# Patient Record
Sex: MALE | Race: BLACK OR AFRICAN AMERICAN | NOT HISPANIC OR LATINO | Employment: OTHER | URBAN - NONMETROPOLITAN AREA
[De-identification: names, ages, dates, MRNs, and addresses within clinical notes are randomized per-mention and may not be internally consistent; named-entity substitution may affect disease eponyms.]

---

## 2017-05-29 PROBLEM — Z12.11 SCREENING FOR COLON CANCER: Status: ACTIVE | Noted: 2017-05-29

## 2018-07-18 PROBLEM — Z12.11 SCREENING FOR COLON CANCER: Status: RESOLVED | Noted: 2017-05-29 | Resolved: 2018-07-18

## 2018-08-16 PROBLEM — M62.838 MUSCLE SPASM: Status: ACTIVE | Noted: 2018-08-16

## 2018-08-16 PROBLEM — M79.641 HAND PAIN, RIGHT: Status: ACTIVE | Noted: 2018-08-16

## 2018-08-16 PROBLEM — M47.816 LUMBAR SPONDYLOSIS: Status: ACTIVE | Noted: 2018-08-16

## 2018-08-16 PROBLEM — M79.18 MYOFASCIAL PAIN: Status: ACTIVE | Noted: 2018-08-16

## 2019-01-17 PROBLEM — M54.12 CERVICAL RADICULOPATHY AT C6: Status: ACTIVE | Noted: 2019-01-17

## 2020-04-17 ENCOUNTER — HISTORICAL (OUTPATIENT)
Dept: ADMINISTRATIVE | Facility: HOSPITAL | Age: 75
End: 2020-04-17

## 2020-04-17 LAB
ADENOVIRUS F 40/41: NOT DETECTED
ASTROVIRUS: NOT DETECTED
CONSISTENCY: NORMAL
CYCLOSPORA CAYETANENSIS: NOT DETECTED
ENTEROAGGREGATIVE E COLI: NOT DETECTED
ENTEROPATHOGENIC E COLI: NOT DETECTED
GI CONTROL: NORMAL
GPP - CAMPYLOBACTER: NOT DETECTED
GPP - CLOSTRIDIUM DIFFICILE TOXIN A/B: NORMAL
GPP - CRYPTOSPORIDIUM: NOT DETECTED
GPP - ENTAMOEBA HISTOLYTICA: NOT DETECTED
GPP - ENTEROTOXIGENIC E COLI (ETEC): NOT DETECTED
GPP - GIARDIA LAMBLIA: NOT DETECTED
GPP - NOROVIRUS GI/GII: NOT DETECTED
GPP - ROTAVIRUS A: NOT DETECTED
GPP - SALMONELLA: NOT DETECTED
GPP - SHIGELLA: NOT DETECTED
GPP - VIBRIO CHOLERA: NOT DETECTED
GPP - YERSINIA ENTEROCOLITICA: NOT DETECTED
LACTATE PLASV-SCNC: NOT DETECTED MMOL/L
PLESIOMONAS SHIGELLOIDES: NOT DETECTED
SAPOVIRUS: NOT DETECTED
VIBRIO: NOT DETECTED

## 2020-05-16 LAB
ALBUMIN SERPL BCP-MCNC: 3.8 G/DL (ref 3.5–5)
ALBUMIN/GLOB SERPL ELPH: 0.8 {RATIO} (ref 1.5–2.2)
ALP SERPL-CCNC: 104 U/L (ref 50–136)
ALT SERPL W P-5'-P-CCNC: 29 U/L (ref 16–61)
ANION GAP SERPL CALC-SCNC: 8.1 MEQ/L (ref 10–20)
AST SERPL-CCNC: 16 U/L (ref 15–37)
BASOPHILS NFR BLD: 0 % (ref 0–1.5)
BASOPHILS NFR BLD: 0 10 (ref 0–0.1)
BILIRUB SERPL-MCNC: 0.48 MG/DL (ref 0.2–1)
BUN SERPL-MCNC: 14 MG/DL (ref 7–18)
CALCIUM SERPL-MCNC: 9.4 MG/DL (ref 8.5–10.1)
CHLORIDE SERPL-SCNC: 108 MMOL/L (ref 98–107)
CO2 SERPL-SCNC: 28 MMOL/L (ref 22–32)
CREAT SERPL-MCNC: 1.19 MG/DL (ref 0.7–1.3)
EGFR: 77 ML/MIN/1.73M
EOSINOPHIL NFR BLD: 0 % (ref 0–7)
EOSINOPHIL NFR BLD: 0 10 (ref 0–0.7)
ERYTHROCYTE [DISTWIDTH] IN BLOOD BY AUTOMATED COUNT: 14.1 % (ref 11.5–14.5)
GLOBULIN: 4.5 G/DL (ref 2.3–3.5)
GLUCOSE SERPL-MCNC: 90 MG/DL (ref 70–99)
GRAN #: 6.39 10 (ref 2–7.5)
GRAN%: 0.3 %
GRAN%: 71 % (ref 50–80)
HCT VFR BLD AUTO: 40.4 % (ref 43.5–53.7)
HGB BLD-MCNC: 13.5 G/DL (ref 14.1–18.1)
IMMATURE GRANULOCYTES #: 0.03 10
LYMPH #: 2 10 (ref 1–3.5)
LYMPH%: 22.4 % (ref 12–50)
MCH RBC QN AUTO: 28.8 PG (ref 27–31)
MCHC RBC AUTO-ENTMCNC: 33.4 G% (ref 32–35)
MCV RBC AUTO: 86.1 FL (ref 80–97)
MONO #: 0.6 10 (ref 0–0.8)
MONO%: 6.3 % (ref 0–12)
OSMOC: 279 MOSM/KG (ref 275–295)
PMV BLD AUTO: 10.2 FL (ref 7.4–10.4)
PMV BLD AUTO: 183 10 (ref 142–424)
POTASSIUM SERPL-SCNC: 4.1 MMOL/L (ref 3.5–5.1)
PROT SERPL-MCNC: 8.3 G/DL (ref 6.4–8.2)
RBC # BLD AUTO: 4.69 M/UL (ref 4.69–6.13)
SODIUM BLD-SCNC: 140 MMOL/L (ref 136–145)
WBC # BLD AUTO: 9 10 (ref 4–10.2)

## 2020-07-28 ENCOUNTER — LAB VISIT (OUTPATIENT)
Dept: LAB | Facility: HOSPITAL | Age: 75
End: 2020-07-28
Attending: INTERNAL MEDICINE
Payer: MEDICARE

## 2020-07-28 DIAGNOSIS — N18.30 CHRONIC KIDNEY DISEASE, STAGE III (MODERATE): ICD-10-CM

## 2020-07-28 DIAGNOSIS — Q61.3 CONGENITAL POLYCYSTIC KIDNEY DISEASE: Primary | ICD-10-CM

## 2020-07-28 LAB
ALBUMIN SERPL BCP-MCNC: 3.2 G/DL (ref 3.5–5.2)
ALP SERPL-CCNC: 104 U/L (ref 55–135)
ALT SERPL W/O P-5'-P-CCNC: 34 U/L (ref 10–44)
ANION GAP SERPL CALC-SCNC: 11 MMOL/L (ref 8–16)
AST SERPL-CCNC: 24 U/L (ref 10–40)
BASOPHILS # BLD AUTO: 0.01 K/UL (ref 0–0.2)
BASOPHILS NFR BLD: 0.1 % (ref 0–1.9)
BILIRUB SERPL-MCNC: 0.6 MG/DL (ref 0.1–1)
BUN SERPL-MCNC: 17 MG/DL (ref 8–23)
CALCIUM SERPL-MCNC: 9.4 MG/DL (ref 8.7–10.5)
CHLORIDE SERPL-SCNC: 105 MMOL/L (ref 95–110)
CO2 SERPL-SCNC: 24 MMOL/L (ref 23–29)
CREAT SERPL-MCNC: 1.4 MG/DL (ref 0.5–1.4)
DIFFERENTIAL METHOD: ABNORMAL
EOSINOPHIL # BLD AUTO: 0 K/UL (ref 0–0.5)
EOSINOPHIL NFR BLD: 0 % (ref 0–8)
ERYTHROCYTE [DISTWIDTH] IN BLOOD BY AUTOMATED COUNT: 14.5 % (ref 11.5–14.5)
EST. GFR  (AFRICAN AMERICAN): 57.2 ML/MIN/1.73 M^2
EST. GFR  (NON AFRICAN AMERICAN): 49.5 ML/MIN/1.73 M^2
GLUCOSE SERPL-MCNC: 94 MG/DL (ref 70–110)
HCT VFR BLD AUTO: 42.4 % (ref 40–54)
HGB BLD-MCNC: 14.1 G/DL (ref 14–18)
IMM GRANULOCYTES # BLD AUTO: 0.08 K/UL (ref 0–0.04)
IMM GRANULOCYTES NFR BLD AUTO: 0.9 % (ref 0–0.5)
LYMPHOCYTES # BLD AUTO: 1.4 K/UL (ref 1–4.8)
LYMPHOCYTES NFR BLD: 15.5 % (ref 18–48)
MCH RBC QN AUTO: 28.6 PG (ref 27–31)
MCHC RBC AUTO-ENTMCNC: 33.3 G/DL (ref 32–36)
MCV RBC AUTO: 86 FL (ref 82–98)
MONOCYTES # BLD AUTO: 0.5 K/UL (ref 0.3–1)
MONOCYTES NFR BLD: 5.5 % (ref 4–15)
NEUTROPHILS # BLD AUTO: 7.3 K/UL (ref 1.8–7.7)
NEUTROPHILS NFR BLD: 78 % (ref 38–73)
NRBC BLD-RTO: 0 /100 WBC
PLATELET # BLD AUTO: 218 K/UL (ref 150–350)
PMV BLD AUTO: 10 FL (ref 9.2–12.9)
POTASSIUM SERPL-SCNC: 4.1 MMOL/L (ref 3.5–5.1)
PROT SERPL-MCNC: 7.7 G/DL (ref 6–8.4)
RBC # BLD AUTO: 4.93 M/UL (ref 4.6–6.2)
SODIUM SERPL-SCNC: 140 MMOL/L (ref 136–145)
WBC # BLD AUTO: 9.3 K/UL (ref 3.9–12.7)

## 2020-07-28 PROCEDURE — 85025 COMPLETE CBC W/AUTO DIFF WBC: CPT

## 2020-07-28 PROCEDURE — 36415 COLL VENOUS BLD VENIPUNCTURE: CPT

## 2020-07-28 PROCEDURE — 80053 COMPREHEN METABOLIC PANEL: CPT

## 2020-08-08 PROBLEM — I21.4 NSTEMI (NON-ST ELEVATED MYOCARDIAL INFARCTION): Status: ACTIVE | Noted: 2020-08-08

## 2020-08-11 PROBLEM — H92.01 RIGHT EAR PAIN: Status: ACTIVE | Noted: 2020-08-11

## 2020-08-11 PROBLEM — H92.02 LEFT EAR PAIN: Status: ACTIVE | Noted: 2020-08-11

## 2020-08-11 PROBLEM — I21.4 NSTEMI (NON-ST ELEVATED MYOCARDIAL INFARCTION): Status: RESOLVED | Noted: 2020-08-08 | Resolved: 2020-08-11

## 2020-08-11 PROBLEM — H70.91 MASTOIDITIS OF RIGHT SIDE: Status: ACTIVE | Noted: 2020-08-11

## 2020-10-09 ENCOUNTER — HOSPITAL ENCOUNTER (EMERGENCY)
Facility: HOSPITAL | Age: 75
Discharge: HOME | End: 2020-10-09
Attending: FAMILY MEDICINE
Payer: MEDICARE

## 2020-10-09 VITALS
WEIGHT: 185 LBS | SYSTOLIC BLOOD PRESSURE: 191 MMHG | OXYGEN SATURATION: 97 % | BODY MASS INDEX: 24.52 KG/M2 | RESPIRATION RATE: 16 BRPM | DIASTOLIC BLOOD PRESSURE: 89 MMHG | HEIGHT: 73 IN | HEART RATE: 78 BPM | TEMPERATURE: 99 F

## 2020-10-09 DIAGNOSIS — E79.0 ELEVATED URIC ACID IN BLOOD: ICD-10-CM

## 2020-10-09 DIAGNOSIS — M10.9 GOUT OF LEFT KNEE, UNSPECIFIED CAUSE, UNSPECIFIED CHRONICITY: ICD-10-CM

## 2020-10-09 DIAGNOSIS — M79.10 MYALGIA: Primary | ICD-10-CM

## 2020-10-09 LAB
ALBUMIN SERPL BCP-MCNC: 3.1 G/DL (ref 3.5–5.2)
ALP SERPL-CCNC: 87 U/L (ref 55–135)
ALT SERPL W/O P-5'-P-CCNC: 24 U/L (ref 10–44)
ANION GAP SERPL CALC-SCNC: 8 MMOL/L (ref 8–16)
AST SERPL-CCNC: 22 U/L (ref 10–40)
BASOPHILS # BLD AUTO: 0.01 K/UL (ref 0–0.2)
BASOPHILS NFR BLD: 0.1 % (ref 0–1.9)
BILIRUB SERPL-MCNC: 0.7 MG/DL (ref 0.1–1)
BUN SERPL-MCNC: 16 MG/DL (ref 8–23)
CALCIUM SERPL-MCNC: 8.8 MG/DL (ref 8.7–10.5)
CHLORIDE SERPL-SCNC: 106 MMOL/L (ref 95–110)
CO2 SERPL-SCNC: 26 MMOL/L (ref 23–29)
CREAT SERPL-MCNC: 1.4 MG/DL (ref 0.5–1.4)
DIFFERENTIAL METHOD: ABNORMAL
EOSINOPHIL # BLD AUTO: 0 K/UL (ref 0–0.5)
EOSINOPHIL NFR BLD: 0 % (ref 0–8)
ERYTHROCYTE [DISTWIDTH] IN BLOOD BY AUTOMATED COUNT: 14.5 % (ref 11.5–14.5)
EST. GFR  (AFRICAN AMERICAN): 57.2 ML/MIN/1.73 M^2
EST. GFR  (NON AFRICAN AMERICAN): 49.5 ML/MIN/1.73 M^2
GLUCOSE SERPL-MCNC: 125 MG/DL (ref 70–110)
HCT VFR BLD AUTO: 36.1 % (ref 40–54)
HGB BLD-MCNC: 11.7 G/DL (ref 14–18)
IMM GRANULOCYTES # BLD AUTO: 0.07 K/UL (ref 0–0.04)
IMM GRANULOCYTES NFR BLD AUTO: 0.6 % (ref 0–0.5)
INFLUENZA A, MOLECULAR: NEGATIVE
INFLUENZA B, MOLECULAR: NEGATIVE
LYMPHOCYTES # BLD AUTO: 1.6 K/UL (ref 1–4.8)
LYMPHOCYTES NFR BLD: 13.9 % (ref 18–48)
MCH RBC QN AUTO: 28.1 PG (ref 27–31)
MCHC RBC AUTO-ENTMCNC: 32.4 G/DL (ref 32–36)
MCV RBC AUTO: 87 FL (ref 82–98)
MONOCYTES # BLD AUTO: 0.4 K/UL (ref 0.3–1)
MONOCYTES NFR BLD: 3.4 % (ref 4–15)
NEUTROPHILS # BLD AUTO: 9.2 K/UL (ref 1.8–7.7)
NEUTROPHILS NFR BLD: 82 % (ref 38–73)
NRBC BLD-RTO: 0 /100 WBC
PLATELET # BLD AUTO: 210 K/UL (ref 150–350)
PMV BLD AUTO: 10.4 FL (ref 9.2–12.9)
POTASSIUM SERPL-SCNC: 3.4 MMOL/L (ref 3.5–5.1)
PROT SERPL-MCNC: 6.9 G/DL (ref 6–8.4)
RBC # BLD AUTO: 4.17 M/UL (ref 4.6–6.2)
SARS-COV-2 RDRP RESP QL NAA+PROBE: NEGATIVE
SODIUM SERPL-SCNC: 140 MMOL/L (ref 136–145)
SPECIMEN SOURCE: NORMAL
URATE SERPL-MCNC: 7.9 MG/DL (ref 3.4–7)
WBC # BLD AUTO: 11.22 K/UL (ref 3.9–12.7)

## 2020-10-09 PROCEDURE — 87502 INFLUENZA DNA AMP PROBE: CPT

## 2020-10-09 PROCEDURE — U0002 COVID-19 LAB TEST NON-CDC: HCPCS

## 2020-10-09 PROCEDURE — 84550 ASSAY OF BLOOD/URIC ACID: CPT

## 2020-10-09 PROCEDURE — 80053 COMPREHEN METABOLIC PANEL: CPT

## 2020-10-09 PROCEDURE — 96361 HYDRATE IV INFUSION ADD-ON: CPT

## 2020-10-09 PROCEDURE — 85025 COMPLETE CBC W/AUTO DIFF WBC: CPT

## 2020-10-09 PROCEDURE — 96375 TX/PRO/DX INJ NEW DRUG ADDON: CPT

## 2020-10-09 PROCEDURE — 25000003 PHARM REV CODE 250: Performed by: FAMILY MEDICINE

## 2020-10-09 PROCEDURE — 63600175 PHARM REV CODE 636 W HCPCS: Performed by: FAMILY MEDICINE

## 2020-10-09 PROCEDURE — 36415 COLL VENOUS BLD VENIPUNCTURE: CPT

## 2020-10-09 PROCEDURE — 99284 EMERGENCY DEPT VISIT MOD MDM: CPT | Mod: 25

## 2020-10-09 PROCEDURE — 96374 THER/PROPH/DIAG INJ IV PUSH: CPT

## 2020-10-09 RX ORDER — MORPHINE SULFATE 4 MG/ML
4 INJECTION, SOLUTION INTRAMUSCULAR; INTRAVENOUS
Status: COMPLETED | OUTPATIENT
Start: 2020-10-09 | End: 2020-10-09

## 2020-10-09 RX ORDER — KETOROLAC TROMETHAMINE 30 MG/ML
30 INJECTION, SOLUTION INTRAMUSCULAR; INTRAVENOUS ONCE
Status: COMPLETED | OUTPATIENT
Start: 2020-10-09 | End: 2020-10-09

## 2020-10-09 RX ORDER — OLMESARTAN MEDOXOMIL 40 MG/1
40 TABLET ORAL DAILY
COMMUNITY

## 2020-10-09 RX ORDER — SODIUM CHLORIDE 9 MG/ML
1000 INJECTION, SOLUTION INTRAVENOUS
Status: COMPLETED | OUTPATIENT
Start: 2020-10-09 | End: 2020-10-09

## 2020-10-09 RX ORDER — NAPROXEN SODIUM 550 MG/1
550 TABLET ORAL 2 TIMES DAILY WITH MEALS
Qty: 8 TABLET | Refills: 0 | Status: SHIPPED | OUTPATIENT
Start: 2020-10-09 | End: 2020-10-13

## 2020-10-09 RX ORDER — ROSUVASTATIN CALCIUM 40 MG/1
40 TABLET, COATED ORAL DAILY
COMMUNITY
End: 2021-10-28

## 2020-10-09 RX ADMIN — MORPHINE SULFATE 4 MG: 4 INJECTION, SOLUTION INTRAMUSCULAR; INTRAVENOUS at 10:10

## 2020-10-09 RX ADMIN — KETOROLAC TROMETHAMINE 30 MG: 30 INJECTION, SOLUTION INTRAMUSCULAR at 08:10

## 2020-10-09 RX ADMIN — SODIUM CHLORIDE 1000 ML: 0.9 INJECTION, SOLUTION INTRAVENOUS at 08:10

## 2020-10-09 NOTE — ED NOTES
Dr Luu instructed pt to take home blood pressure medication and wait 20 minutes for reassessment of bp.  Pt declined.  Pt stated that he would take his medication at home and monitor his blood pressure at home.

## 2020-10-09 NOTE — ED PROVIDER NOTES
Encounter Date: 10/9/2020       History     Chief Complaint   Patient presents with    Generalized Body Aches     onset 4 days ago     Patient is a 74-year-old male with a history of gout who presents with lower extremity myalgias especially in the knees bilaterally.  He also complains of aching in his ankles and feet.  This has been present about 2 days.  He denies any fever.  He denies any injury.        Review of patient's allergies indicates:   Allergen Reactions    Penicillins Rash     Past Medical History:   Diagnosis Date    BPH (benign prostatic hyperplasia)     High cholesterol     Hypertension      Past Surgical History:   Procedure Laterality Date    ANTERIOR CERVICAL DISCECTOMY W/ FUSION      cardiac stents      CATARACT EXTRACTION, BILATERAL      COLONOSCOPY      COLONOSCOPY N/A 2017    Procedure: COLONOSCOPY;  Surgeon: Luis Bogran-Reyes, MD;  Location: [facility];  Service: Endoscopy;  Laterality: N/A;    ESOPHAGOGASTRODUODENOSCOPY      LUMBAR LAMINECTOMY  2012    L2-4, Dr. Stover    PROSTATE SURGERY       Family History   Problem Relation Age of Onset    Stroke Mother     Hypertension Mother     Cataracts Father     Thyroid disease Sister     Blindness Brother     Stroke Sister      Social History     Tobacco Use    Smoking status: Former Smoker     Quit date: 1980     Years since quittin.9    Smokeless tobacco: Never Used   Substance Use Topics    Alcohol use: Yes     Comment: occ    Drug use: No     Review of Systems   Constitutional: Negative for fever.   HENT: Negative for sore throat.    Respiratory: Negative for shortness of breath.    Cardiovascular: Negative for chest pain.   Gastrointestinal: Negative for nausea.   Genitourinary: Negative for dysuria.   Musculoskeletal: Positive for arthralgias and myalgias. Negative for back pain.   Skin: Negative for rash.   Neurological: Negative for weakness.   Hematological: Does not bruise/bleed  easily.       Physical Exam     Initial Vitals [10/09/20 0756]   BP Pulse Resp Temp SpO2   (!) 156/83 98 16 98.7 °F (37.1 °C) 96 %      MAP       --         Physical Exam    Nursing note and vitals reviewed.  Constitutional: He appears well-developed and well-nourished.   HENT:   Head: Normocephalic and atraumatic.   Eyes: EOM are normal. Pupils are equal, round, and reactive to light.   Neck: Normal range of motion. Neck supple.   Cardiovascular: Normal rate, regular rhythm, normal heart sounds and intact distal pulses.   Pulmonary/Chest: Breath sounds normal. No respiratory distress. He has no wheezes. He has no rhonchi. He has no rales.   Abdominal: Soft. Bowel sounds are normal. He exhibits no distension. There is no abdominal tenderness. There is no rebound.   Musculoskeletal: Normal range of motion. No tenderness or edema.      Comments: Knees pain to palpation bilaterally.  There is no obvious effusion or swelling.  Pain is mainly with flexion and extension of the joints.   Neurological: He is alert and oriented to person, place, and time. No cranial nerve deficit.   Skin: Skin is warm. Capillary refill takes less than 2 seconds.   Psychiatric: He has a normal mood and affect. His behavior is normal. Judgment and thought content normal.         ED Course   Procedures  Labs Reviewed   CBC W/ AUTO DIFFERENTIAL - Abnormal; Notable for the following components:       Result Value    RBC 4.17 (*)     Hemoglobin 11.7 (*)     Hematocrit 36.1 (*)     Immature Granulocytes 0.6 (*)     Gran # (ANC) 9.2 (*)     Immature Grans (Abs) 0.07 (*)     Gran% 82.0 (*)     Lymph% 13.9 (*)     Mono% 3.4 (*)     All other components within normal limits   COMPREHENSIVE METABOLIC PANEL - Abnormal; Notable for the following components:    Potassium 3.4 (*)     Glucose 125 (*)     Albumin 3.1 (*)     eGFR if  57.2 (*)     eGFR if non  49.5 (*)     All other components within normal limits   URIC ACID -  Abnormal; Notable for the following components:    Uric Acid 7.9 (*)     All other components within normal limits   INFLUENZA A & B BY MOLECULAR   SARS-COV-2 RNA AMPLIFICATION, QUAL    Narrative:     Is this needed for pre-procedure or pre-op testing?->No          Imaging Results    None          Medical Decision Making:   Initial Assessment:   74-year-old male with lower extremity myalgias and arthralgias x2 days.  History of gout.  Patient is afebrile.  Differential Diagnosis:   Osteoarthritis, gout,  Clinical Tests:   Lab Tests: Ordered  ED Management:  Patient is symptomatically improved after IV Toradol and morphine.  Cause of patient's symptoms is uncertain, however could be secondary to gout flare and increase uric acid levels.  Patient will be given a prescription for Naprosyn.  He was advised to follow-up with his primary care physician.                             Clinical Impression:     ICD-10-CM ICD-9-CM   1. Myalgia  M79.10 729.1   2. Elevated uric acid in blood  E79.0 790.6   3. Gout of left knee, unspecified cause, unspecified chronicity  M10.9 274.9                      Disposition:   Disposition: Discharged  Condition: Stable     ED Disposition Condition    Discharge Stable        ED Prescriptions     Medication Sig Dispense Start Date End Date Auth. Provider    naproxen sodium (ANAPROX) 550 MG tablet Take 1 tablet (550 mg total) by mouth 2 (two) times daily with meals. for 8 doses 8 tablet 10/9/2020 10/13/2020 Ramesh Luu Jr., MD        Follow-up Information     Follow up With Specialties Details Why Contact Info    Tho Duran Jr., MD Internal Medicine  As needed [address] [facility] [place] [address]  [phone]                                         Ramesh Luu Jr., MD  10/09/20 1043

## 2020-11-14 ENCOUNTER — HOSPITAL ENCOUNTER (EMERGENCY)
Facility: HOSPITAL | Age: 75
Discharge: HOME | End: 2020-11-14
Attending: EMERGENCY MEDICINE
Payer: MEDICARE

## 2020-11-14 VITALS
BODY MASS INDEX: 25.31 KG/M2 | HEART RATE: 70 BPM | RESPIRATION RATE: 18 BRPM | OXYGEN SATURATION: 98 % | HEIGHT: 73 IN | TEMPERATURE: 98 F | DIASTOLIC BLOOD PRESSURE: 75 MMHG | SYSTOLIC BLOOD PRESSURE: 165 MMHG | WEIGHT: 191 LBS

## 2020-11-14 DIAGNOSIS — M54.40 LOW BACK PAIN WITH SCIATICA, SCIATICA LATERALITY UNSPECIFIED, UNSPECIFIED BACK PAIN LATERALITY, UNSPECIFIED CHRONICITY: Primary | ICD-10-CM

## 2020-11-14 PROCEDURE — 99281 EMR DPT VST MAYX REQ PHY/QHP: CPT

## 2020-11-14 NOTE — ED NOTES
NEUROLOGICAL:   Patient is awake , alert  and oriented x 4 . Pupils are PERRL. Gait is steady.   Moves all extremities without difficulty.   Patient reports no neuro complaints..  GCS 15    HEENT:   Head appears normocephalic  and symmetric .   Eyes appear WNL to both eyes. Patient reports no complaints  to both eyes .   Ears appear WNL. Patient reports no complaints  to both ears.   Nares appear patent . Patient reports no nose complaints .  Mouth appears moist, pink and teeth intact. Patient reports no mouth complaints.   Throat appears pink and moist . Patient reports no throat complaints.    CARDIOVASCULAR:   S1 and S2 present, no murmurs, gallops, or rubs, rate regular  and pulses palpable (2+)    On palpation no edema noted , noted to none.   Patient reports no CV complaints.  .   Patient vitals are WNL.    RESPIRATORY:   Airway Clear, Open, and Patent.  Respirations are even and unlabored.   Breath sounds clear  to all lung fields.   Patient reports no respiratory complaints.     GASTROINTESTINAL:   Abdomen is soft  and non-tender x 4 quadrants. Bowel sounds are normoactive to all quadrants .   Patient reports no GI complaints .     GENITOURINARY:   Patient reports no  complaints.     MUSCULOSKELETAL:   full range of motion to all extremities, no swelling noted , no tenderness noted and no weakness noted.   Patient reports generalized back pain (upper and lower). Patient reports this is a chronic problem but has seemed to worsen over the past day. Already taking Baclofen and Gabapentin. Reports he does not want to receive any medicine or shot here today    SKIN:   Skin appears warm , dry , good turgor, color normal for race and intact. Patient reports no skin complaints.

## 2020-11-14 NOTE — ED PROVIDER NOTES
"Encounter Date: 2020       History     Chief Complaint   Patient presents with    Back Pain     Patient to the ER with complaints of back pain onset "a long time ago"     74 year old male presents with back pain.  Denies new trauma.  Seen by Dr Duran this week and given Toradol.  Also has appointment with Dr Gray next week.  Taking gabapentin and baclofen.  Declines pain medication.  Denies weakness, fevers, abdominal pain        Review of patient's allergies indicates:   Allergen Reactions    Penicillins Rash     Past Medical History:   Diagnosis Date    BPH (benign prostatic hyperplasia)     High cholesterol     Hypertension      Past Surgical History:   Procedure Laterality Date    ANTERIOR CERVICAL DISCECTOMY W/ FUSION      cardiac stents      CATARACT EXTRACTION, BILATERAL      COLONOSCOPY      COLONOSCOPY N/A 2017    Procedure: COLONOSCOPY;  Surgeon: Luis Bogran-Reyes, MD;  Location: [facility];  Service: Endoscopy;  Laterality: N/A;    ESOPHAGOGASTRODUODENOSCOPY      LUMBAR LAMINECTOMY  2012    L2-4, Dr. Stover    PROSTATE SURGERY       Family History   Problem Relation Age of Onset    Stroke Mother     Hypertension Mother     Cataracts Father     Thyroid disease Sister     Blindness Brother     Stroke Sister      Social History     Tobacco Use    Smoking status: Former Smoker     Quit date: 1980     Years since quittin.0    Smokeless tobacco: Never Used   Substance Use Topics    Alcohol use: Yes     Comment: occ    Drug use: No     Review of Systems   Musculoskeletal: Positive for back pain.        Right upper leg pain   All other systems reviewed and are negative.      Physical Exam     Initial Vitals [20 1138]   BP Pulse Resp Temp SpO2   (!) 191/89 73 18 98.3 °F (36.8 °C) 98 %      MAP       --         Physical Exam    Nursing note and vitals reviewed.  Constitutional: He appears well-developed and well-nourished.   Cardiovascular: Normal " rate and regular rhythm.   Pulmonary/Chest: Breath sounds normal. No respiratory distress.   Abdominal: Soft. There is no abdominal tenderness.   Musculoskeletal:      Comments: Mild diffuse tenderness to palpation over lumbar area, no point tenderness, no crepitus, no deformity.  Full range of motion of lower extremities strength is normal   Neurological: He is alert and oriented to person, place, and time.   Skin: Skin is warm and dry.         ED Course   Procedures  Labs Reviewed - No data to display       Imaging Results    None          Medical Decision Making:   ED Management:  Patient declines pain medication, he has seen Dr. Duran has appoint with Dr. Gray and possibly an appointment to have an MRI                             Clinical Impression:     ICD-10-CM ICD-9-CM   1. Low back pain with sciatica, sciatica laterality unspecified, unspecified back pain laterality, unspecified chronicity  M54.40 724.3                          ED Disposition Condition    Discharge Stable        ED Prescriptions     None        Follow-up Information     Follow up With Specialties Details Why Contact Info    Tho Duran Jr., MD Internal Medicine  As needed, If symptoms worsen [address] [facility] [place] [address]  [phone]                                         Nikolai Shah MD  11/14/20 1247

## 2021-01-28 ENCOUNTER — HOSPITAL ENCOUNTER (OUTPATIENT)
Dept: RADIOLOGY | Facility: HOSPITAL | Age: 76
Discharge: HOME | End: 2021-01-28
Attending: INTERNAL MEDICINE
Payer: MEDICARE

## 2021-01-28 DIAGNOSIS — Z01.818 PRE-OP EXAMINATION: Primary | ICD-10-CM

## 2021-01-28 DIAGNOSIS — Z01.818 PRE-OP EXAMINATION: ICD-10-CM

## 2021-01-28 PROCEDURE — 71046 X-RAY EXAM CHEST 2 VIEWS: CPT | Mod: TC

## 2021-01-29 ENCOUNTER — APPOINTMENT (OUTPATIENT)
Dept: LAB | Facility: HOSPITAL | Age: 76
End: 2021-01-29
Attending: INTERNAL MEDICINE
Payer: MEDICARE

## 2021-01-29 DIAGNOSIS — Z01.810 PRE-OPERATIVE CARDIOVASCULAR EXAMINATION: Primary | ICD-10-CM

## 2021-01-29 LAB — SARS-COV-2 RNA RESP QL NAA+PROBE: NOT DETECTED

## 2021-01-29 PROCEDURE — U0002 COVID-19 LAB TEST NON-CDC: HCPCS

## 2021-02-02 PROBLEM — I20.9 ANGINA, CLASS III: Status: ACTIVE | Noted: 2021-02-02

## 2021-02-27 ENCOUNTER — HOSPITAL ENCOUNTER (EMERGENCY)
Facility: HOSPITAL | Age: 76
Discharge: HOME | End: 2021-02-27
Attending: EMERGENCY MEDICINE
Payer: MEDICARE

## 2021-02-27 VITALS
TEMPERATURE: 99 F | WEIGHT: 185 LBS | BODY MASS INDEX: 24.52 KG/M2 | SYSTOLIC BLOOD PRESSURE: 152 MMHG | HEIGHT: 73 IN | RESPIRATION RATE: 18 BRPM | HEART RATE: 69 BPM | OXYGEN SATURATION: 97 % | DIASTOLIC BLOOD PRESSURE: 81 MMHG

## 2021-02-27 DIAGNOSIS — S39.012A LUMBAR STRAIN, INITIAL ENCOUNTER: ICD-10-CM

## 2021-02-27 DIAGNOSIS — M54.50 ACUTE LEFT-SIDED LOW BACK PAIN WITHOUT SCIATICA: Primary | ICD-10-CM

## 2021-02-27 PROCEDURE — 96372 THER/PROPH/DIAG INJ SC/IM: CPT

## 2021-02-27 PROCEDURE — 63600175 PHARM REV CODE 636 W HCPCS: Performed by: EMERGENCY MEDICINE

## 2021-02-27 PROCEDURE — 99284 EMERGENCY DEPT VISIT MOD MDM: CPT | Mod: 25

## 2021-02-27 PROCEDURE — 25000003 PHARM REV CODE 250: Performed by: EMERGENCY MEDICINE

## 2021-02-27 RX ORDER — METHOCARBAMOL 500 MG/1
1000 TABLET, FILM COATED ORAL 3 TIMES DAILY
Qty: 30 TABLET | Refills: 0 | Status: SHIPPED | OUTPATIENT
Start: 2021-02-27 | End: 2021-03-04

## 2021-02-27 RX ORDER — DEXAMETHASONE SODIUM PHOSPHATE 4 MG/ML
8 INJECTION, SOLUTION INTRA-ARTICULAR; INTRALESIONAL; INTRAMUSCULAR; INTRAVENOUS; SOFT TISSUE
Status: COMPLETED | OUTPATIENT
Start: 2021-02-27 | End: 2021-02-27

## 2021-02-27 RX ORDER — METHOCARBAMOL 500 MG/1
1000 TABLET, FILM COATED ORAL
Status: COMPLETED | OUTPATIENT
Start: 2021-02-27 | End: 2021-02-27

## 2021-02-27 RX ORDER — KETOROLAC TROMETHAMINE 30 MG/ML
30 INJECTION, SOLUTION INTRAMUSCULAR; INTRAVENOUS
Status: COMPLETED | OUTPATIENT
Start: 2021-02-27 | End: 2021-02-27

## 2021-02-27 RX ADMIN — METHOCARBAMOL 1000 MG: 500 TABLET ORAL at 08:02

## 2021-02-27 RX ADMIN — KETOROLAC TROMETHAMINE 30 MG: 30 INJECTION, SOLUTION INTRAMUSCULAR at 08:02

## 2021-02-27 RX ADMIN — DEXAMETHASONE SODIUM PHOSPHATE 8 MG: 4 INJECTION, SOLUTION INTRA-ARTICULAR; INTRALESIONAL; INTRAMUSCULAR; INTRAVENOUS; SOFT TISSUE at 08:02

## 2021-03-22 PROBLEM — M48.062 SPINAL STENOSIS OF LUMBAR REGION WITH NEUROGENIC CLAUDICATION: Status: ACTIVE | Noted: 2021-03-22

## 2021-04-27 ENCOUNTER — HOSPITAL ENCOUNTER (EMERGENCY)
Facility: HOSPITAL | Age: 76
Discharge: HOME | End: 2021-04-27
Attending: EMERGENCY MEDICINE
Payer: MEDICARE

## 2021-04-27 VITALS
BODY MASS INDEX: 24.52 KG/M2 | DIASTOLIC BLOOD PRESSURE: 82 MMHG | WEIGHT: 185 LBS | HEIGHT: 73 IN | SYSTOLIC BLOOD PRESSURE: 162 MMHG | OXYGEN SATURATION: 98 % | HEART RATE: 66 BPM | RESPIRATION RATE: 18 BRPM | TEMPERATURE: 98 F

## 2021-04-27 DIAGNOSIS — M79.10 MYALGIA: ICD-10-CM

## 2021-04-27 DIAGNOSIS — R11.0 NAUSEA: Primary | ICD-10-CM

## 2021-04-27 DIAGNOSIS — G89.29 CHRONIC BACK PAIN, UNSPECIFIED BACK LOCATION, UNSPECIFIED BACK PAIN LATERALITY: ICD-10-CM

## 2021-04-27 DIAGNOSIS — M54.9 CHRONIC BACK PAIN, UNSPECIFIED BACK LOCATION, UNSPECIFIED BACK PAIN LATERALITY: ICD-10-CM

## 2021-04-27 LAB
ALBUMIN SERPL BCP-MCNC: 3.5 G/DL (ref 3.5–5.2)
ALP SERPL-CCNC: 126 U/L (ref 55–135)
ALT SERPL W/O P-5'-P-CCNC: 24 U/L (ref 10–44)
ANION GAP SERPL CALC-SCNC: 5 MMOL/L (ref 8–16)
AST SERPL-CCNC: 18 U/L (ref 10–40)
BASOPHILS # BLD AUTO: 0.01 K/UL (ref 0–0.2)
BASOPHILS NFR BLD: 0.1 % (ref 0–1.9)
BILIRUB SERPL-MCNC: 0.5 MG/DL (ref 0.1–1)
BILIRUB UR QL STRIP: NEGATIVE
BUN SERPL-MCNC: 18 MG/DL (ref 8–23)
CALCIUM SERPL-MCNC: 9.5 MG/DL (ref 8.7–10.5)
CHLORIDE SERPL-SCNC: 109 MMOL/L (ref 95–110)
CLARITY UR: CLEAR
CO2 SERPL-SCNC: 26 MMOL/L (ref 23–29)
COLOR UR: YELLOW
CREAT SERPL-MCNC: 1.5 MG/DL (ref 0.5–1.4)
CTP QC/QA: YES
DIFFERENTIAL METHOD: ABNORMAL
EOSINOPHIL # BLD AUTO: 0 K/UL (ref 0–0.5)
EOSINOPHIL NFR BLD: 0 % (ref 0–8)
ERYTHROCYTE [DISTWIDTH] IN BLOOD BY AUTOMATED COUNT: 13.6 % (ref 11.5–14.5)
EST. GFR  (AFRICAN AMERICAN): 52.3 ML/MIN/1.73 M^2
EST. GFR  (NON AFRICAN AMERICAN): 45.2 ML/MIN/1.73 M^2
GLUCOSE SERPL-MCNC: 128 MG/DL (ref 70–110)
GLUCOSE UR QL STRIP: NEGATIVE
HCT VFR BLD AUTO: 39.6 % (ref 40–54)
HGB BLD-MCNC: 12.9 G/DL (ref 14–18)
HGB UR QL STRIP: NEGATIVE
IMM GRANULOCYTES # BLD AUTO: 0.03 K/UL (ref 0–0.04)
IMM GRANULOCYTES NFR BLD AUTO: 0.4 % (ref 0–0.5)
KETONES UR QL STRIP: NEGATIVE
LEUKOCYTE ESTERASE UR QL STRIP: NEGATIVE
LYMPHOCYTES # BLD AUTO: 1.5 K/UL (ref 1–4.8)
LYMPHOCYTES NFR BLD: 22.7 % (ref 18–48)
MCH RBC QN AUTO: 27.6 PG (ref 27–31)
MCHC RBC AUTO-ENTMCNC: 32.6 G/DL (ref 32–36)
MCV RBC AUTO: 85 FL (ref 82–98)
MONOCYTES # BLD AUTO: 0.5 K/UL (ref 0.3–1)
MONOCYTES NFR BLD: 7.6 % (ref 4–15)
NEUTROPHILS # BLD AUTO: 4.6 K/UL (ref 1.8–7.7)
NEUTROPHILS NFR BLD: 69.2 % (ref 38–73)
NITRITE UR QL STRIP: NEGATIVE
NRBC BLD-RTO: 0 /100 WBC
PH UR STRIP: 5 [PH] (ref 5–8)
PLATELET # BLD AUTO: 231 K/UL (ref 150–450)
PMV BLD AUTO: 9.4 FL (ref 9.2–12.9)
POTASSIUM SERPL-SCNC: 3.7 MMOL/L (ref 3.5–5.1)
PROT SERPL-MCNC: 8.3 G/DL (ref 6–8.4)
PROT UR QL STRIP: NEGATIVE
RBC # BLD AUTO: 4.67 M/UL (ref 4.6–6.2)
SARS-COV-2 RDRP RESP QL NAA+PROBE: NEGATIVE
SODIUM SERPL-SCNC: 140 MMOL/L (ref 136–145)
SP GR UR STRIP: 1.01 (ref 1–1.03)
URN SPEC COLLECT METH UR: NORMAL
UROBILINOGEN UR STRIP-ACNC: NEGATIVE EU/DL
WBC # BLD AUTO: 6.71 K/UL (ref 3.9–12.7)

## 2021-04-27 PROCEDURE — 36415 COLL VENOUS BLD VENIPUNCTURE: CPT | Performed by: NURSE PRACTITIONER

## 2021-04-27 PROCEDURE — 80053 COMPREHEN METABOLIC PANEL: CPT | Performed by: NURSE PRACTITIONER

## 2021-04-27 PROCEDURE — 85025 COMPLETE CBC W/AUTO DIFF WBC: CPT | Performed by: NURSE PRACTITIONER

## 2021-04-27 PROCEDURE — 81003 URINALYSIS AUTO W/O SCOPE: CPT | Performed by: NURSE PRACTITIONER

## 2021-04-27 PROCEDURE — U0002 COVID-19 LAB TEST NON-CDC: HCPCS | Performed by: NURSE PRACTITIONER

## 2021-04-27 PROCEDURE — 99283 EMERGENCY DEPT VISIT LOW MDM: CPT

## 2021-04-27 RX ORDER — TRAMADOL HYDROCHLORIDE 50 MG/1
50 TABLET ORAL EVERY 6 HOURS PRN
COMMUNITY
End: 2021-07-07

## 2021-05-06 ENCOUNTER — PATIENT MESSAGE (OUTPATIENT)
Dept: RESEARCH | Facility: HOSPITAL | Age: 76
End: 2021-05-06

## 2021-05-21 PROBLEM — R73.01 IFG (IMPAIRED FASTING GLUCOSE): Status: ACTIVE | Noted: 2021-05-21

## 2021-05-21 PROBLEM — R07.89 CHEST WALL PAIN: Status: ACTIVE | Noted: 2021-05-21

## 2021-05-21 PROBLEM — N52.9 ERECTILE DYSFUNCTION: Status: ACTIVE | Noted: 2021-05-21

## 2021-05-21 PROBLEM — M25.519 SHOULDER PAIN: Status: ACTIVE | Noted: 2021-05-21

## 2021-05-21 PROBLEM — N18.30 CKD (CHRONIC KIDNEY DISEASE), STAGE III: Status: ACTIVE | Noted: 2021-05-21

## 2021-06-21 PROBLEM — E63.0 ESSENTIAL FATTY ACID DEFICIENCY: Status: ACTIVE | Noted: 2021-06-21

## 2021-07-01 ENCOUNTER — PATIENT MESSAGE (OUTPATIENT)
Dept: ADMINISTRATIVE | Facility: OTHER | Age: 76
End: 2021-07-01

## 2021-07-02 PROBLEM — E61.8 DEFICIENCY OF COENZYME Q10: Status: ACTIVE | Noted: 2021-07-02

## 2021-07-02 PROBLEM — Z00.00 WELL ADULT EXAM: Status: ACTIVE | Noted: 2021-07-02

## 2021-07-02 PROBLEM — E88.89 DEFICIENCY OF COENZYME Q10: Status: ACTIVE | Noted: 2021-07-02

## 2021-07-29 DIAGNOSIS — Q61.3 POLYCYSTIC KIDNEY, UNSPECIFIED: Primary | ICD-10-CM

## 2021-07-30 ENCOUNTER — HOSPITAL ENCOUNTER (OUTPATIENT)
Dept: RADIOLOGY | Facility: HOSPITAL | Age: 76
Discharge: HOME | End: 2021-07-30
Attending: INTERNAL MEDICINE
Payer: MEDICARE

## 2021-07-30 ENCOUNTER — HOSPITAL ENCOUNTER (EMERGENCY)
Facility: HOSPITAL | Age: 76
Discharge: HOME | End: 2021-07-30
Payer: MEDICARE

## 2021-07-30 VITALS
HEIGHT: 73 IN | OXYGEN SATURATION: 100 % | RESPIRATION RATE: 18 BRPM | HEART RATE: 84 BPM | SYSTOLIC BLOOD PRESSURE: 161 MMHG | DIASTOLIC BLOOD PRESSURE: 92 MMHG | TEMPERATURE: 98 F | BODY MASS INDEX: 23.06 KG/M2 | WEIGHT: 174 LBS

## 2021-07-30 DIAGNOSIS — Q61.3 POLYCYSTIC KIDNEY, UNSPECIFIED: ICD-10-CM

## 2021-07-30 PROCEDURE — 99900041 HC LEFT WITHOUT BEING SEEN- EMERGENCY

## 2021-07-30 PROCEDURE — 76770 US EXAM ABDO BACK WALL COMP: CPT | Mod: TC

## 2021-10-04 PROBLEM — Z00.00 WELL ADULT EXAM: Status: RESOLVED | Noted: 2021-07-02 | Resolved: 2021-10-04

## 2021-12-13 ENCOUNTER — LAB VISIT (OUTPATIENT)
Dept: LAB | Facility: HOSPITAL | Age: 76
End: 2021-12-13
Attending: PHYSICIAN ASSISTANT
Payer: MEDICARE

## 2021-12-13 DIAGNOSIS — R10.9 GASTRIC PAIN: Primary | ICD-10-CM

## 2021-12-13 DIAGNOSIS — R19.7 DIARRHEA OF PRESUMED INFECTIOUS ORIGIN: ICD-10-CM

## 2021-12-13 LAB
ALBUMIN SERPL BCP-MCNC: 3.5 G/DL (ref 3.5–5.2)
ALP SERPL-CCNC: 105 U/L (ref 55–135)
ALT SERPL W/O P-5'-P-CCNC: 38 U/L (ref 10–44)
ANION GAP SERPL CALC-SCNC: 6 MMOL/L (ref 8–16)
AST SERPL-CCNC: 18 U/L (ref 10–40)
BASOPHILS # BLD AUTO: 0 K/UL (ref 0–0.2)
BASOPHILS NFR BLD: 0 % (ref 0–1.9)
BILIRUB SERPL-MCNC: 0.5 MG/DL (ref 0.1–1)
BUN SERPL-MCNC: 19 MG/DL (ref 8–23)
CALCIUM SERPL-MCNC: 9.4 MG/DL (ref 8.7–10.5)
CHLORIDE SERPL-SCNC: 108 MMOL/L (ref 95–110)
CO2 SERPL-SCNC: 26 MMOL/L (ref 23–29)
CREAT SERPL-MCNC: 1.4 MG/DL (ref 0.5–1.4)
DIFFERENTIAL METHOD: ABNORMAL
EOSINOPHIL # BLD AUTO: 0 K/UL (ref 0–0.5)
EOSINOPHIL NFR BLD: 0 % (ref 0–8)
ERYTHROCYTE [DISTWIDTH] IN BLOOD BY AUTOMATED COUNT: 14.3 % (ref 11.5–14.5)
EST. GFR  (AFRICAN AMERICAN): 56.4 ML/MIN/1.73 M^2
EST. GFR  (NON AFRICAN AMERICAN): 48.8 ML/MIN/1.73 M^2
GLUCOSE SERPL-MCNC: 98 MG/DL (ref 70–110)
HCT VFR BLD AUTO: 40.3 % (ref 40–54)
HGB BLD-MCNC: 13.2 G/DL (ref 14–18)
IMM GRANULOCYTES # BLD AUTO: 0.03 K/UL (ref 0–0.04)
IMM GRANULOCYTES NFR BLD AUTO: 0.3 % (ref 0–0.5)
LYMPHOCYTES # BLD AUTO: 2.4 K/UL (ref 1–4.8)
LYMPHOCYTES NFR BLD: 27.5 % (ref 18–48)
MCH RBC QN AUTO: 28.3 PG (ref 27–31)
MCHC RBC AUTO-ENTMCNC: 32.8 G/DL (ref 32–36)
MCV RBC AUTO: 86 FL (ref 82–98)
MONOCYTES # BLD AUTO: 0.6 K/UL (ref 0.3–1)
MONOCYTES NFR BLD: 6.8 % (ref 4–15)
NEUTROPHILS # BLD AUTO: 5.7 K/UL (ref 1.8–7.7)
NEUTROPHILS NFR BLD: 65.4 % (ref 38–73)
NRBC BLD-RTO: 0 /100 WBC
PLATELET # BLD AUTO: 146 K/UL (ref 150–450)
PMV BLD AUTO: 9.9 FL (ref 9.2–12.9)
POTASSIUM SERPL-SCNC: 3.9 MMOL/L (ref 3.5–5.1)
PROT SERPL-MCNC: 7.8 G/DL (ref 6–8.4)
RBC # BLD AUTO: 4.67 M/UL (ref 4.6–6.2)
SODIUM SERPL-SCNC: 140 MMOL/L (ref 136–145)
WBC # BLD AUTO: 8.77 K/UL (ref 3.9–12.7)

## 2021-12-13 PROCEDURE — 85025 COMPLETE CBC W/AUTO DIFF WBC: CPT | Performed by: PHYSICIAN ASSISTANT

## 2021-12-13 PROCEDURE — 36415 COLL VENOUS BLD VENIPUNCTURE: CPT | Performed by: PHYSICIAN ASSISTANT

## 2021-12-13 PROCEDURE — 87177 OVA AND PARASITES SMEARS: CPT | Performed by: PHYSICIAN ASSISTANT

## 2021-12-13 PROCEDURE — 87427 SHIGA-LIKE TOXIN AG IA: CPT | Mod: 59 | Performed by: PHYSICIAN ASSISTANT

## 2021-12-13 PROCEDURE — 87046 STOOL CULTR AEROBIC BACT EA: CPT | Performed by: PHYSICIAN ASSISTANT

## 2021-12-13 PROCEDURE — 80053 COMPREHEN METABOLIC PANEL: CPT | Performed by: PHYSICIAN ASSISTANT

## 2021-12-13 PROCEDURE — 87209 SMEAR COMPLEX STAIN: CPT | Performed by: PHYSICIAN ASSISTANT

## 2021-12-13 PROCEDURE — 87045 FECES CULTURE AEROBIC BACT: CPT | Performed by: PHYSICIAN ASSISTANT

## 2021-12-17 LAB
BACTERIA STL CULT: NORMAL
BACTERIA STL CULT: NORMAL
E COLI SXT1 STL QL IA: NEGATIVE
E COLI SXT2 STL QL IA: NEGATIVE

## 2021-12-18 LAB — O+P STL MICRO: NORMAL

## 2022-02-26 ENCOUNTER — HOSPITAL ENCOUNTER (EMERGENCY)
Facility: HOSPITAL | Age: 77
Discharge: HOME | End: 2022-02-26
Attending: EMERGENCY MEDICINE
Payer: MEDICARE

## 2022-02-26 VITALS
SYSTOLIC BLOOD PRESSURE: 194 MMHG | OXYGEN SATURATION: 97 % | RESPIRATION RATE: 18 BRPM | HEART RATE: 68 BPM | HEIGHT: 73 IN | BODY MASS INDEX: 23.59 KG/M2 | WEIGHT: 178 LBS | TEMPERATURE: 98 F | DIASTOLIC BLOOD PRESSURE: 89 MMHG

## 2022-02-26 DIAGNOSIS — K57.92 DIVERTICULITIS: Primary | ICD-10-CM

## 2022-02-26 LAB
ALBUMIN SERPL BCP-MCNC: 3.4 G/DL (ref 3.5–5.2)
ALP SERPL-CCNC: 98 U/L (ref 55–135)
ALT SERPL W/O P-5'-P-CCNC: 53 U/L (ref 10–44)
ANION GAP SERPL CALC-SCNC: 2 MMOL/L (ref 8–16)
AST SERPL-CCNC: 27 U/L (ref 10–40)
BASOPHILS # BLD AUTO: 0.01 K/UL (ref 0–0.2)
BASOPHILS NFR BLD: 0.1 % (ref 0–1.9)
BILIRUB SERPL-MCNC: 0.4 MG/DL (ref 0.1–1)
BILIRUB UR QL STRIP: NEGATIVE
BUN SERPL-MCNC: 20 MG/DL (ref 8–23)
CALCIUM SERPL-MCNC: 8.9 MG/DL (ref 8.7–10.5)
CHLORIDE SERPL-SCNC: 107 MMOL/L (ref 95–110)
CLARITY UR: CLEAR
CO2 SERPL-SCNC: 31 MMOL/L (ref 23–29)
COLOR UR: YELLOW
CREAT SERPL-MCNC: 1.5 MG/DL (ref 0.5–1.4)
DIFFERENTIAL METHOD: ABNORMAL
EOSINOPHIL # BLD AUTO: 0 K/UL (ref 0–0.5)
EOSINOPHIL NFR BLD: 0 % (ref 0–8)
ERYTHROCYTE [DISTWIDTH] IN BLOOD BY AUTOMATED COUNT: 14.3 % (ref 11.5–14.5)
EST. GFR  (AFRICAN AMERICAN): 51.9 ML/MIN/1.73 M^2
EST. GFR  (NON AFRICAN AMERICAN): 44.9 ML/MIN/1.73 M^2
GLUCOSE SERPL-MCNC: 111 MG/DL (ref 70–110)
GLUCOSE UR QL STRIP: NEGATIVE
HCT VFR BLD AUTO: 40.5 % (ref 40–54)
HGB BLD-MCNC: 14.3 G/DL (ref 14–18)
HGB UR QL STRIP: NEGATIVE
IMM GRANULOCYTES # BLD AUTO: 0.05 K/UL (ref 0–0.04)
IMM GRANULOCYTES NFR BLD AUTO: 0.7 % (ref 0–0.5)
KETONES UR QL STRIP: NEGATIVE
LEUKOCYTE ESTERASE UR QL STRIP: NEGATIVE
LIPASE SERPL-CCNC: 317 U/L (ref 23–300)
LYMPHOCYTES # BLD AUTO: 1.9 K/UL (ref 1–4.8)
LYMPHOCYTES NFR BLD: 25.7 % (ref 18–48)
MCH RBC QN AUTO: 30.5 PG (ref 27–31)
MCHC RBC AUTO-ENTMCNC: 35.3 G/DL (ref 32–36)
MCV RBC AUTO: 86 FL (ref 82–98)
MONOCYTES # BLD AUTO: 0.5 K/UL (ref 0.3–1)
MONOCYTES NFR BLD: 6.8 % (ref 4–15)
NEUTROPHILS # BLD AUTO: 4.9 K/UL (ref 1.8–7.7)
NEUTROPHILS NFR BLD: 66.7 % (ref 38–73)
NITRITE UR QL STRIP: NEGATIVE
NRBC BLD-RTO: 0 /100 WBC
PH UR STRIP: 6 [PH] (ref 5–8)
PLATELET # BLD AUTO: 150 K/UL (ref 150–450)
PMV BLD AUTO: 10.2 FL (ref 9.2–12.9)
POTASSIUM SERPL-SCNC: 3.6 MMOL/L (ref 3.5–5.1)
PROT SERPL-MCNC: 7.5 G/DL (ref 6–8.4)
PROT UR QL STRIP: NEGATIVE
RBC # BLD AUTO: 4.69 M/UL (ref 4.6–6.2)
SODIUM SERPL-SCNC: 140 MMOL/L (ref 136–145)
SP GR UR STRIP: 1.01 (ref 1–1.03)
URN SPEC COLLECT METH UR: NORMAL
UROBILINOGEN UR STRIP-ACNC: NEGATIVE EU/DL
WBC # BLD AUTO: 7.35 K/UL (ref 3.9–12.7)

## 2022-02-26 PROCEDURE — 85025 COMPLETE CBC W/AUTO DIFF WBC: CPT | Performed by: EMERGENCY MEDICINE

## 2022-02-26 PROCEDURE — 83690 ASSAY OF LIPASE: CPT | Performed by: EMERGENCY MEDICINE

## 2022-02-26 PROCEDURE — 96374 THER/PROPH/DIAG INJ IV PUSH: CPT

## 2022-02-26 PROCEDURE — 81003 URINALYSIS AUTO W/O SCOPE: CPT | Performed by: EMERGENCY MEDICINE

## 2022-02-26 PROCEDURE — 36415 COLL VENOUS BLD VENIPUNCTURE: CPT | Performed by: EMERGENCY MEDICINE

## 2022-02-26 PROCEDURE — 25500020 PHARM REV CODE 255: Performed by: EMERGENCY MEDICINE

## 2022-02-26 PROCEDURE — 63600175 PHARM REV CODE 636 W HCPCS: Performed by: EMERGENCY MEDICINE

## 2022-02-26 PROCEDURE — 99285 EMERGENCY DEPT VISIT HI MDM: CPT | Mod: 25

## 2022-02-26 PROCEDURE — 96372 THER/PROPH/DIAG INJ SC/IM: CPT | Mod: 59

## 2022-02-26 PROCEDURE — 25000003 PHARM REV CODE 250: Performed by: EMERGENCY MEDICINE

## 2022-02-26 PROCEDURE — 80053 COMPREHEN METABOLIC PANEL: CPT | Performed by: EMERGENCY MEDICINE

## 2022-02-26 RX ORDER — SIMETHICONE 80 MG
1 TABLET,CHEWABLE ORAL ONCE
Status: COMPLETED | OUTPATIENT
Start: 2022-02-26 | End: 2022-02-26

## 2022-02-26 RX ORDER — DICYCLOMINE HYDROCHLORIDE 20 MG/1
20 TABLET ORAL 2 TIMES DAILY PRN
Qty: 20 TABLET | Refills: 0 | Status: SHIPPED | OUTPATIENT
Start: 2022-02-26 | End: 2022-03-28

## 2022-02-26 RX ORDER — METRONIDAZOLE 500 MG/1
500 TABLET ORAL EVERY 8 HOURS
Qty: 21 TABLET | Refills: 0 | Status: SHIPPED | OUTPATIENT
Start: 2022-02-26 | End: 2022-03-05

## 2022-02-26 RX ORDER — CIPROFLOXACIN 500 MG/1
500 TABLET ORAL 2 TIMES DAILY
Qty: 14 TABLET | Refills: 0 | Status: SHIPPED | OUTPATIENT
Start: 2022-02-26 | End: 2022-03-05

## 2022-02-26 RX ORDER — LOPERAMIDE HYDROCHLORIDE 2 MG/1
2 CAPSULE ORAL 4 TIMES DAILY PRN
Qty: 30 CAPSULE | Refills: 0 | Status: SHIPPED | OUTPATIENT
Start: 2022-02-26 | End: 2022-03-08

## 2022-02-26 RX ORDER — SIMETHICONE 80 MG
80 TABLET,CHEWABLE ORAL EVERY 6 HOURS PRN
Qty: 30 TABLET | Refills: 0 | Status: SHIPPED | OUTPATIENT
Start: 2022-02-26 | End: 2023-05-23 | Stop reason: ALTCHOICE

## 2022-02-26 RX ORDER — DICYCLOMINE HYDROCHLORIDE 10 MG/ML
20 INJECTION INTRAMUSCULAR
Status: COMPLETED | OUTPATIENT
Start: 2022-02-26 | End: 2022-02-26

## 2022-02-26 RX ORDER — MORPHINE SULFATE 4 MG/ML
4 INJECTION, SOLUTION INTRAMUSCULAR; INTRAVENOUS
Status: COMPLETED | OUTPATIENT
Start: 2022-02-26 | End: 2022-02-26

## 2022-02-26 RX ADMIN — DICYCLOMINE HYDROCHLORIDE 20 MG: 20 INJECTION, SOLUTION INTRAMUSCULAR at 09:02

## 2022-02-26 RX ADMIN — SIMETHICONE 80 MG: 80 TABLET, CHEWABLE ORAL at 11:02

## 2022-02-26 RX ADMIN — MORPHINE SULFATE 4 MG: 4 INJECTION INTRAVENOUS at 09:02

## 2022-02-26 RX ADMIN — IOHEXOL 100 ML: 350 INJECTION, SOLUTION INTRAVENOUS at 09:02

## 2022-02-26 NOTE — ED NOTES
Pt wants to lay in room for a few minutes and wait for pharmacy to get meds ready due to continued pain.

## 2022-03-02 PROBLEM — K57.92 DIVERTICULITIS: Status: ACTIVE | Noted: 2022-03-02

## 2022-03-05 ENCOUNTER — HOSPITAL ENCOUNTER (EMERGENCY)
Facility: HOSPITAL | Age: 77
Discharge: HOME | End: 2022-03-05
Attending: EMERGENCY MEDICINE
Payer: MEDICARE

## 2022-03-05 VITALS
WEIGHT: 177.81 LBS | HEIGHT: 73 IN | OXYGEN SATURATION: 97 % | DIASTOLIC BLOOD PRESSURE: 98 MMHG | BODY MASS INDEX: 23.56 KG/M2 | RESPIRATION RATE: 18 BRPM | HEART RATE: 69 BPM | SYSTOLIC BLOOD PRESSURE: 207 MMHG | TEMPERATURE: 98 F

## 2022-03-05 DIAGNOSIS — Z13.9 SCREENING DUE: ICD-10-CM

## 2022-03-05 DIAGNOSIS — R10.84 GENERALIZED ABDOMINAL PAIN: Primary | ICD-10-CM

## 2022-03-05 LAB
ALBUMIN SERPL BCP-MCNC: 3.5 G/DL (ref 3.5–5.2)
ALP SERPL-CCNC: 100 U/L (ref 55–135)
ALT SERPL W/O P-5'-P-CCNC: 55 U/L (ref 10–44)
ANION GAP SERPL CALC-SCNC: 2 MMOL/L (ref 8–16)
ANION GAP SERPL CALC-SCNC: 5 MMOL/L (ref 8–16)
AST SERPL-CCNC: 58 U/L (ref 10–40)
BASOPHILS # BLD AUTO: 0.01 K/UL (ref 0–0.2)
BASOPHILS NFR BLD: 0.1 % (ref 0–1.9)
BILIRUB SERPL-MCNC: 0.5 MG/DL (ref 0.1–1)
BILIRUB UR QL STRIP: NEGATIVE
BUN SERPL-MCNC: 20 MG/DL (ref 8–23)
BUN SERPL-MCNC: 21 MG/DL (ref 8–23)
CALCIUM SERPL-MCNC: 9.3 MG/DL (ref 8.7–10.5)
CALCIUM SERPL-MCNC: 9.4 MG/DL (ref 8.7–10.5)
CHLORIDE SERPL-SCNC: 106 MMOL/L (ref 95–110)
CHLORIDE SERPL-SCNC: 106 MMOL/L (ref 95–110)
CLARITY UR: CLEAR
CO2 SERPL-SCNC: 28 MMOL/L (ref 23–29)
CO2 SERPL-SCNC: 29 MMOL/L (ref 23–29)
COLOR UR: YELLOW
CREAT SERPL-MCNC: 1.4 MG/DL (ref 0.5–1.4)
CREAT SERPL-MCNC: 1.5 MG/DL (ref 0.5–1.4)
DIFFERENTIAL METHOD: NORMAL
EOSINOPHIL # BLD AUTO: 0 K/UL (ref 0–0.5)
EOSINOPHIL NFR BLD: 0 % (ref 0–8)
ERYTHROCYTE [DISTWIDTH] IN BLOOD BY AUTOMATED COUNT: 14.1 % (ref 11.5–14.5)
EST. GFR  (AFRICAN AMERICAN): 51.9 ML/MIN/1.73 M^2
EST. GFR  (AFRICAN AMERICAN): 56.4 ML/MIN/1.73 M^2
EST. GFR  (NON AFRICAN AMERICAN): 44.9 ML/MIN/1.73 M^2
EST. GFR  (NON AFRICAN AMERICAN): 48.8 ML/MIN/1.73 M^2
GLUCOSE SERPL-MCNC: 120 MG/DL (ref 70–110)
GLUCOSE SERPL-MCNC: 120 MG/DL (ref 70–110)
GLUCOSE UR QL STRIP: NEGATIVE
HCT VFR BLD AUTO: 41.6 % (ref 40–54)
HGB BLD-MCNC: 14 G/DL (ref 14–18)
HGB UR QL STRIP: NEGATIVE
IMM GRANULOCYTES # BLD AUTO: 0.04 K/UL (ref 0–0.04)
IMM GRANULOCYTES NFR BLD AUTO: 0.5 % (ref 0–0.5)
KETONES UR QL STRIP: NEGATIVE
LEUKOCYTE ESTERASE UR QL STRIP: NEGATIVE
LIPASE SERPL-CCNC: 42 U/L (ref 23–300)
LYMPHOCYTES # BLD AUTO: 1.9 K/UL (ref 1–4.8)
LYMPHOCYTES NFR BLD: 24.2 % (ref 18–48)
MCH RBC QN AUTO: 28.5 PG (ref 27–31)
MCHC RBC AUTO-ENTMCNC: 33.7 G/DL (ref 32–36)
MCV RBC AUTO: 85 FL (ref 82–98)
MONOCYTES # BLD AUTO: 0.7 K/UL (ref 0.3–1)
MONOCYTES NFR BLD: 8.5 % (ref 4–15)
NEUTROPHILS # BLD AUTO: 5.3 K/UL (ref 1.8–7.7)
NEUTROPHILS NFR BLD: 66.7 % (ref 38–73)
NITRITE UR QL STRIP: NEGATIVE
NRBC BLD-RTO: 0 /100 WBC
PH UR STRIP: 6 [PH] (ref 5–8)
PLATELET # BLD AUTO: 150 K/UL (ref 150–450)
PMV BLD AUTO: 9.9 FL (ref 9.2–12.9)
POTASSIUM SERPL-SCNC: 3.6 MMOL/L (ref 3.5–5.1)
POTASSIUM SERPL-SCNC: 5.9 MMOL/L (ref 3.5–5.1)
PROT SERPL-MCNC: 8.2 G/DL (ref 6–8.4)
PROT UR QL STRIP: NEGATIVE
RBC # BLD AUTO: 4.91 M/UL (ref 4.6–6.2)
SODIUM SERPL-SCNC: 137 MMOL/L (ref 136–145)
SODIUM SERPL-SCNC: 139 MMOL/L (ref 136–145)
SP GR UR STRIP: 1.01 (ref 1–1.03)
URN SPEC COLLECT METH UR: NORMAL
UROBILINOGEN UR STRIP-ACNC: NEGATIVE EU/DL
WBC # BLD AUTO: 7.97 K/UL (ref 3.9–12.7)

## 2022-03-05 PROCEDURE — 93010 EKG 12-LEAD: ICD-10-PCS | Mod: ,,, | Performed by: INTERNAL MEDICINE

## 2022-03-05 PROCEDURE — 85025 COMPLETE CBC W/AUTO DIFF WBC: CPT | Performed by: EMERGENCY MEDICINE

## 2022-03-05 PROCEDURE — 83690 ASSAY OF LIPASE: CPT | Performed by: EMERGENCY MEDICINE

## 2022-03-05 PROCEDURE — 36415 COLL VENOUS BLD VENIPUNCTURE: CPT | Performed by: EMERGENCY MEDICINE

## 2022-03-05 PROCEDURE — 25000003 PHARM REV CODE 250: Performed by: EMERGENCY MEDICINE

## 2022-03-05 PROCEDURE — 80048 BASIC METABOLIC PNL TOTAL CA: CPT | Performed by: EMERGENCY MEDICINE

## 2022-03-05 PROCEDURE — 93010 ELECTROCARDIOGRAM REPORT: CPT | Mod: ,,, | Performed by: INTERNAL MEDICINE

## 2022-03-05 PROCEDURE — 80053 COMPREHEN METABOLIC PANEL: CPT | Performed by: EMERGENCY MEDICINE

## 2022-03-05 PROCEDURE — 96372 THER/PROPH/DIAG INJ SC/IM: CPT

## 2022-03-05 PROCEDURE — 99284 EMERGENCY DEPT VISIT MOD MDM: CPT | Mod: 25

## 2022-03-05 PROCEDURE — 93005 ELECTROCARDIOGRAM TRACING: CPT

## 2022-03-05 PROCEDURE — 63600175 PHARM REV CODE 636 W HCPCS: Performed by: EMERGENCY MEDICINE

## 2022-03-05 PROCEDURE — 81003 URINALYSIS AUTO W/O SCOPE: CPT | Performed by: EMERGENCY MEDICINE

## 2022-03-05 RX ORDER — SIMETHICONE 80 MG
1 TABLET,CHEWABLE ORAL ONCE
Status: COMPLETED | OUTPATIENT
Start: 2022-03-05 | End: 2022-03-05

## 2022-03-05 RX ORDER — DICYCLOMINE HYDROCHLORIDE 10 MG/ML
20 INJECTION INTRAMUSCULAR
Status: COMPLETED | OUTPATIENT
Start: 2022-03-05 | End: 2022-03-05

## 2022-03-05 RX ADMIN — DICYCLOMINE HYDROCHLORIDE 20 MG: 20 INJECTION, SOLUTION INTRAMUSCULAR at 01:03

## 2022-03-05 RX ADMIN — SIMETHICONE 80 MG: 80 TABLET, CHEWABLE ORAL at 01:03

## 2022-03-05 NOTE — ED PROVIDER NOTES
EMERGENCY DEPARTMENT HISTORY AND PHYSICAL EXAM          Date: 3/5/2022   Patient Name: James Baltazar       History of Presenting Illness           Chief Complaint   Patient presents with    Abdominal Pain     Hx of diverticulits and c/o abd pain this morning. Lots of cramping and feels bloated.  Called to make an appt with Dr. Duran but he was out for the holiday.         History Provided By: Patient       James Baltazar is a 76 y.o. male with PMHX of hypertension, AAA, CAD, enlarged prostate, hyperlipidemia who presents to the emergency department C/O abdominal pain.    Patient reports crampy gas like abdominal pain for today.  Reports feeling distended.  Patient reports some nausea no vomiting.  Last bowel movement yesterday and was normal.  Patient was recently evaluated and treated for diverticulitis  on February 26 in this ER.  He reports that after treatment his symptoms improved before returning today.  No fever.    PCP: Tho Duran Jr, MD        No current facility-administered medications for this encounter.     Current Outpatient Medications   Medication Sig Dispense Refill    amlodipine (NORVASC) 10 MG tablet Take 10 mg by mouth once daily.      aspirin (ECOTRIN) 81 MG EC tablet Take 1 tablet (81 mg total) by mouth once. for 1 dose  0    bempedoic acid (NEXLETOL) 180 mg Tab Take 1 tablet (180 mg total) by mouth once daily. 30 tablet 5    cholecalciferol, vitamin D3, (VITAMIN D3) 10 mcg (400 unit) Cap Take 400 Units by mouth once daily.      ciprofloxacin HCl (CIPRO) 500 MG tablet Take 1 tablet (500 mg total) by mouth 2 (two) times daily. for 7 days 14 tablet 0    clopidogreL (PLAVIX) 75 mg tablet Take 1 tablet (75 mg total) by mouth once daily. 30 tablet 11    cyclobenzaprine (FLEXERIL) 10 MG tablet Take 10 mg by mouth 3 (three) times daily as needed for Muscle spasms.      dicyclomine (BENTYL) 20 mg tablet Take 1 tablet (20 mg total) by mouth 2 (two) times daily as needed (Abdominal  Cramps/Pain). 20 tablet 0    gabapentin (NEURONTIN) 600 MG tablet Take 0.5 tablets (300 mg total) by mouth 3 (three) times daily as needed. (Patient taking differently: Take 300 mg by mouth 3 (three) times daily. )      hydrochlorothiazide (HYDRODIURIL) 25 MG tablet Take 25 mg by mouth once daily.      Lactobac. rhamnosus GG-inulin (CULTURELLE DIGESTIVE HEALTH) 10 billion cell -200 mg Cap Take 1 capsule by mouth once daily. 30 capsule 0    loperamide (IMODIUM) 2 mg capsule Take 1 capsule (2 mg total) by mouth 4 (four) times daily as needed for Diarrhea. 30 capsule 0    metoprolol succinate (TOPROL-XL) 50 MG 24 hr tablet TAKE 2 TABLETS(100 MG) BY MOUTH EVERY  tablet 1    metroNIDAZOLE (FLAGYL) 500 MG tablet Take 1 tablet (500 mg total) by mouth every 8 (eight) hours. for 7 days 21 tablet 0    multivitamin (THERAGRAN) per tablet Take 1 tablet by mouth once daily.      olmesartan (BENICAR) 40 MG tablet Take 40 mg by mouth once daily.      rosuvastatin (CRESTOR) 40 MG Tab TAKE 1 TABLET BY MOUTH DAILY 30 tablet 5    simethicone (MYLICON) 80 MG chewable tablet Take 1 tablet (80 mg total) by mouth every 6 (six) hours as needed for Flatulence. 30 tablet 0           Past History     Past Medical History:   Past Medical History:   Diagnosis Date    BPH (benign prostatic hyperplasia)     Coronary artery disease     PTCA STENT RCA/CFX 2014 and CFX 2021    Diabetes mellitus     NO DM MEDS- PATIENT DENIES    Digestive disorder     Diverticulitis 3/2/2022    Encounter for blood transfusion     GERD (gastroesophageal reflux disease)     Gout     High cholesterol     Hypertension     Kidney cysts     UTI (urinary tract infection)         Past Surgical History:   Past Surgical History:   Procedure Laterality Date    ANTERIOR CERVICAL DISCECTOMY W/ FUSION  2007    cardiac stents      CATARACT EXTRACTION, BILATERAL      COLONOSCOPY      COLONOSCOPY N/A 5/29/2017    Procedure: COLONOSCOPY;  Surgeon:  Luis Bogran-Reyes, MD;  Location: [facility];  Service: Endoscopy;  Laterality: N/A;    CORONARY ANGIOGRAPHY N/A 2021    Procedure: ANGIOGRAM, CORONARY ARTERY;  Surgeon: James Nash MD;  Location: [facility] CATH;  Service: Cardiology;  Laterality: N/A;    ESOPHAGOGASTRODUODENOSCOPY      LUMBAR LAMINECTOMY  2012    L2-4, Dr. Stover    PROSTATE SURGERY          Family History:   Family History   Problem Relation Age of Onset    Stroke Mother     Hypertension Mother     Cataracts Father     Thyroid disease Sister     Blindness Brother     Stroke Sister         Social History:   Social History     Tobacco Use    Smoking status: Former Smoker     Quit date: 1980     Years since quittin.3    Smokeless tobacco: Never Used   Substance Use Topics    Alcohol use: Yes     Comment: SELDOM    Drug use: No        Allergies:   Review of patient's allergies indicates:   Allergen Reactions    Penicillins Rash          Review of Systems   Review of Systems   Constitutional: Negative for appetite change, chills and fever.   HENT: Negative for congestion, rhinorrhea and sore throat.    Eyes: Negative for pain and discharge.   Respiratory: Negative for cough, chest tightness, shortness of breath and wheezing.    Cardiovascular: Negative for chest pain and palpitations.   Gastrointestinal: Positive for abdominal pain and nausea. Negative for vomiting.   Genitourinary: Negative for difficulty urinating and dysuria.   Musculoskeletal: Negative for myalgias and neck pain.   Skin: Negative for rash and wound.   Neurological: Negative for dizziness, weakness, numbness and headaches.   Psychiatric/Behavioral: Negative for confusion and dysphoric mood.   All other systems reviewed and are negative.               Physical Exam     Vitals:    22 1317 22 1318 22 1507   BP:  (!) 220/113 (!) 207/98   BP Location:   Right arm   Patient Position:   Sitting   Pulse:  94 69   Resp:  18 18  "  Temp:  98.3 °F (36.8 °C) 98.3 °F (36.8 °C)   TempSrc:   Oral   SpO2:  98% 97%   Weight: 80.6 kg (177 lb 12.8 oz)     Height: 6' 1" (1.854 m)        Physical Exam  Vitals and nursing note reviewed.   Constitutional:       General: He is not in acute distress.     Appearance: Normal appearance. He is well-developed. He is not ill-appearing.   HENT:      Head: Normocephalic and atraumatic.      Nose: No congestion or rhinorrhea.   Eyes:      Conjunctiva/sclera: Conjunctivae normal.      Pupils: Pupils are equal, round, and reactive to light.   Cardiovascular:      Rate and Rhythm: Normal rate and regular rhythm.      Heart sounds: Normal heart sounds.   Pulmonary:      Effort: Pulmonary effort is normal. No respiratory distress.   Abdominal:      General: Abdomen is flat. Bowel sounds are increased.      Palpations: Abdomen is soft.      Tenderness: There is abdominal tenderness. There is no guarding or rebound. Negative signs include Cruz's sign and McBurney's sign.   Genitourinary:     Testes:         Right: Tenderness or swelling not present.         Left: Tenderness or swelling not present.   Musculoskeletal:         General: No deformity or signs of injury. Normal range of motion.      Cervical back: Normal range of motion and neck supple.   Skin:     General: Skin is warm and dry.      Coloration: Skin is not pale.      Findings: No lesion.   Neurological:      General: No focal deficit present.      Mental Status: He is alert and oriented to person, place, and time.      Cranial Nerves: No cranial nerve deficit.      Sensory: No sensory deficit.      Motor: No weakness.   Psychiatric:         Mood and Affect: Mood normal.         Behavior: Behavior normal.              Diagnostic Study Results      Labs -   Recent Results (from the past 12 hour(s))   CBC W/ AUTO DIFFERENTIAL    Collection Time: 03/05/22  1:45 PM   Result Value Ref Range    WBC 7.97 3.90 - 12.70 K/uL    RBC 4.91 4.60 - 6.20 M/uL    Hemoglobin " 14.0 14.0 - 18.0 g/dL    Hematocrit 41.6 40.0 - 54.0 %    MCV 85 82 - 98 fL    MCH 28.5 27.0 - 31.0 pg    MCHC 33.7 32.0 - 36.0 g/dL    RDW 14.1 11.5 - 14.5 %    Platelets 150 150 - 450 K/uL    MPV 9.9 9.2 - 12.9 fL    Immature Granulocytes 0.5 0.0 - 0.5 %    Gran # (ANC) 5.3 1.8 - 7.7 K/uL    Immature Grans (Abs) 0.04 0.00 - 0.04 K/uL    Lymph # 1.9 1.0 - 4.8 K/uL    Mono # 0.7 0.3 - 1.0 K/uL    Eos # 0.0 0.0 - 0.5 K/uL    Baso # 0.01 0.00 - 0.20 K/uL    nRBC 0 0 /100 WBC    Gran % 66.7 38.0 - 73.0 %    Lymph % 24.2 18.0 - 48.0 %    Mono % 8.5 4.0 - 15.0 %    Eosinophil % 0.0 0.0 - 8.0 %    Basophil % 0.1 0.0 - 1.9 %    Differential Method Automated    Comp. Metabolic Panel    Collection Time: 03/05/22  1:45 PM   Result Value Ref Range    Sodium 137 136 - 145 mmol/L    Potassium 5.9 (H) 3.5 - 5.1 mmol/L    Chloride 106 95 - 110 mmol/L    CO2 29 23 - 29 mmol/L    Glucose 120 (H) 70 - 110 mg/dL    BUN 21 8 - 23 mg/dL    Creatinine 1.5 (H) 0.5 - 1.4 mg/dL    Calcium 9.4 8.7 - 10.5 mg/dL    Total Protein 8.2 6.0 - 8.4 g/dL    Albumin 3.5 3.5 - 5.2 g/dL    Total Bilirubin 0.5 0.1 - 1.0 mg/dL    Alkaline Phosphatase 100 55 - 135 U/L    AST 58 (H) 10 - 40 U/L    ALT 55 (H) 10 - 44 U/L    Anion Gap 2 (L) 8 - 16 mmol/L    eGFR if African American 51.9 (A) >60 mL/min/1.73 m^2    eGFR if non African American 44.9 (A) >60 mL/min/1.73 m^2   Lipase    Collection Time: 03/05/22  1:45 PM   Result Value Ref Range    Lipase Result 42 23 - 300 U/L   Urinalysis, Reflex to Urine Culture Urine, Clean Catch    Collection Time: 03/05/22  1:53 PM    Specimen: Urine, Clean Catch   Result Value Ref Range    Specimen UA Urine, Clean Catch     Color, UA Yellow Yellow, Straw, Maria Dolores    Appearance, UA Clear Clear    pH, UA 6.0 5.0 - 8.0    Specific Gravity, UA 1.010 1.005 - 1.030    Protein, UA Negative Negative    Glucose, UA Negative Negative    Ketones, UA Negative Negative    Bilirubin (UA) Negative Negative    Occult Blood UA Negative  Negative    Nitrite, UA Negative Negative    Urobilinogen, UA Negative <2.0 EU/dL    Leukocytes, UA Negative Negative   Basic metabolic panel    Collection Time: 03/05/22  2:33 PM   Result Value Ref Range    Sodium 139 136 - 145 mmol/L    Potassium 3.6 3.5 - 5.1 mmol/L    Chloride 106 95 - 110 mmol/L    CO2 28 23 - 29 mmol/L    Glucose 120 (H) 70 - 110 mg/dL    BUN 20 8 - 23 mg/dL    Creatinine 1.4 0.5 - 1.4 mg/dL    Calcium 9.3 8.7 - 10.5 mg/dL    Anion Gap 5 (L) 8 - 16 mmol/L    eGFR if African American 56.4 (A) >60 mL/min/1.73 m^2    eGFR if non  48.8 (A) >60 mL/min/1.73 m^2        Radiologic Studies -    No orders to display        Medications given in the ED-   Medications   dicyclomine injection 20 mg (20 mg Intramuscular Given 3/5/22 1353)   simethicone chewable tablet 80 mg (80 mg Oral Given 3/5/22 1352)           Medical Decision Making    I am the first provider for this patient.     I reviewed the vital signs, available nursing notes, past medical history, past surgical history, family history and social history.     Vital Signs:  Reviewed the patient's vital signs.     Pulse Oximetry Analysis and Interpretation:    98% on Room Air, normal        EKG interpretation: (Preliminary)   Interpreted by Tera Whittington MD at 1427   Sinus rhythm rate of 71 with first-degree AV block, AL interval 240, and left axis deviation, normal T-wave morphology,         Records Reviewed: Old medical records.  Nursing notes.        Provider Notes (Medical Decision Making): James Baltazar is a 76 y.o. male here with abdominal pain vital signs notable for hypertension.  Patient was evaluated in February 26 for similar symptoms and diagnosed with diverticulosis as well as mild diverticulitis.    Patient noted to have AAA and this was found to be stable with 3.1 cm and diameter.  Had additional stable iliac aneurysms.    The patient reports interval improvement since prior visit until symptoms returned.  He has  been taking the antibiotics but has been missing doses as he still has some antibiotics remaining      Procedures:   Procedures      ED Course:    2:28 PM  K+ elevated, 5.9. Will recheck.  Cr is at baseline       3:10 PM  Her I set a BMP which demonstrates normal potassium.  Initial source of  Was hemolyzed.  Labs benign no leukocytosis.  Patient tolerated p.o. has been having reported normal bowel movements.  No difficulties with urination.  Urine normal.  Abdomen is soft and non peritonitic.  At this time no indications for a new imaging given recent visit and similar symptoms.  Patient advised to continue antibiotic course as well as take Bentyl and simethicone as needed.  Advised follow-up with primary care and potentially GI if symptoms persist.        Diagnosis and Disposition     Critical Care:      DISCHARGE NOTE:       James Baltazar's  results have been reviewed with him.  He has been counseled regarding his diagnosis, treatment, and plan.  He verbally conveys understanding and agreement of the signs, symptoms, diagnosis, treatment and prognosis and additionally agrees to follow up as discussed.  He also agrees with the care-plan and conveys that all of his questions have been answered.  I have also provided discharge instructions for him that include: educational information regarding their diagnosis and treatment, and list of reasons why they would want to return to the ED prior to their follow-up appointment, should his condition change. He has been provided with education for proper emergency department utilization.         CLINICAL IMPRESSION:        1. Generalized abdominal pain    2. Screening due              PLAN:   1. Discharge Home  2.      Medication List      ASK your doctor about these medications    amLODIPine 10 MG tablet  Commonly known as: NORVASC     aspirin 81 MG EC tablet  Commonly known as: ECOTRIN  Take 1 tablet (81 mg total) by mouth once. for 1 dose     ciprofloxacin HCl 500 MG  tablet  Commonly known as: CIPRO  Take 1 tablet (500 mg total) by mouth 2 (two) times daily. for 7 days     clopidogreL 75 mg tablet  Commonly known as: PLAVIX  Take 1 tablet (75 mg total) by mouth once daily.     [facility] DIGESTIVE [facility] 10 billion cell -200 mg Cap  Generic drug: Lactobac. rhamnosus GG-inulin  Take 1 capsule by mouth once daily.     cyclobenzaprine 10 MG tablet  Commonly known as: FLEXERIL     dicyclomine 20 mg tablet  Commonly known as: BENTYL  Take 1 tablet (20 mg total) by mouth 2 (two) times daily as needed (Abdominal Cramps/Pain).     gabapentin 600 MG tablet  Commonly known as: NEURONTIN  Take 0.5 tablets (300 mg total) by mouth 3 (three) times daily as needed.     hydroCHLOROthiazide 25 MG tablet  Commonly known as: HYDRODIURIL     loperamide 2 mg capsule  Commonly known as: IMODIUM  Take 1 capsule (2 mg total) by mouth 4 (four) times daily as needed for Diarrhea.     metoprolol succinate 50 MG 24 hr tablet  Commonly known as: TOPROL-XL  TAKE 2 TABLETS(100 MG) BY MOUTH EVERY DAY     metroNIDAZOLE 500 MG tablet  Commonly known as: FLAGYL  Take 1 tablet (500 mg total) by mouth every 8 (eight) hours. for 7 days     multivitamin per tablet  Commonly known as: THERAGRAN     NEXLETOL 180 mg Tab  Generic drug: bempedoic acid  Take 1 tablet (180 mg total) by mouth once daily.     olmesartan 40 MG tablet  Commonly known as: BENICAR     rosuvastatin 40 MG Tab  Commonly known as: CRESTOR  TAKE 1 TABLET BY MOUTH DAILY     simethicone 80 MG chewable tablet  Commonly known as: MYLICON  Take 1 tablet (80 mg total) by mouth every 6 (six) hours as needed for Flatulence.     VITAMIN D3 10 mcg (400 unit) Cap  Generic drug: cholecalciferol (vitamin D3)           3. Tho Duran Jr., MD  [address] [place] [address]  [phone]    Schedule an appointment as soon as possible for a visit   Primary care follow up       _______________________________     Please note that this dictation was  completed with M*Modal, the computer voice recognition software.  Quite often unanticipated grammatical, syntax, homophones, and other interpretive errors are inadvertently transcribed by the computer software.  Please disregard these errors.  Please excuse any errors that have escaped final proofreading.             Tera Whittington MD  03/05/22 [id number]

## 2022-05-01 ENCOUNTER — HOSPITAL ENCOUNTER (EMERGENCY)
Facility: HOSPITAL | Age: 77
Discharge: HOME | End: 2022-05-01
Attending: EMERGENCY MEDICINE
Payer: MEDICARE

## 2022-05-01 VITALS
TEMPERATURE: 99 F | SYSTOLIC BLOOD PRESSURE: 175 MMHG | BODY MASS INDEX: 23.54 KG/M2 | HEART RATE: 90 BPM | RESPIRATION RATE: 18 BRPM | DIASTOLIC BLOOD PRESSURE: 94 MMHG | WEIGHT: 177.63 LBS | HEIGHT: 73 IN | OXYGEN SATURATION: 96 %

## 2022-05-01 DIAGNOSIS — M25.512 CHRONIC LEFT SHOULDER PAIN: Primary | ICD-10-CM

## 2022-05-01 DIAGNOSIS — M54.9 UPPER BACK PAIN: ICD-10-CM

## 2022-05-01 DIAGNOSIS — G89.29 CHRONIC LEFT SHOULDER PAIN: Primary | ICD-10-CM

## 2022-05-01 PROCEDURE — 96372 THER/PROPH/DIAG INJ SC/IM: CPT | Performed by: EMERGENCY MEDICINE

## 2022-05-01 PROCEDURE — 99284 EMERGENCY DEPT VISIT MOD MDM: CPT | Mod: 25

## 2022-05-01 PROCEDURE — 63600175 PHARM REV CODE 636 W HCPCS: Performed by: EMERGENCY MEDICINE

## 2022-05-01 RX ORDER — DICLOFENAC SODIUM 10 MG/G
2 GEL TOPICAL 4 TIMES DAILY PRN
Qty: 50 G | Refills: 0 | Status: ON HOLD | OUTPATIENT
Start: 2022-05-01 | End: 2023-02-06

## 2022-05-01 RX ORDER — LIDOCAINE 50 MG/G
1 PATCH TOPICAL DAILY PRN
Qty: 10 PATCH | Refills: 0 | Status: SHIPPED | OUTPATIENT
Start: 2022-05-01 | End: 2022-05-11

## 2022-05-01 RX ORDER — KETOROLAC TROMETHAMINE 30 MG/ML
15 INJECTION, SOLUTION INTRAMUSCULAR; INTRAVENOUS
Status: COMPLETED | OUTPATIENT
Start: 2022-05-01 | End: 2022-05-01

## 2022-05-01 RX ORDER — LIDOCAINE 50 MG/G
1 PATCH TOPICAL
Status: DISCONTINUED | OUTPATIENT
Start: 2022-05-01 | End: 2022-05-01 | Stop reason: HOSPADM

## 2022-05-01 RX ADMIN — KETOROLAC TROMETHAMINE 15 MG: 30 INJECTION, SOLUTION INTRAMUSCULAR at 05:05

## 2022-05-01 NOTE — ED PROVIDER NOTES
EMERGENCY DEPARTMENT HISTORY AND PHYSICAL EXAM          Date: 5/1/2022   Patient Name: James Baltazar       History of Presenting Illness           Chief Complaint   Patient presents with    Back Pain    Shoulder Pain     Patient is presenting with pain in shoulders and back, states he was supposed to have surgery last year on areas of pain. Pain worsened in the beginning of the year. Pt takes gabapentin 3x per day.          History Provided By: Patient    0527   James Baltazar is a 76 y.o. male with PMHX of hypertension, CAD, AAA, hyperlipidemia, diverticulitis who presents to the emergency department C/O shoulder and back pain.    Patient reports chronic left shoulder and upper back pain that radiates up to his posterior neck.  States this is been going on for months.  Reports anti-inflammatories have helped.  He has recently started gabapentin.  He comes this morning because pain continues.  Worse with body motion and movement.  Patient reports he is followed by an orthopedic surgeon and was post of surgeon as left rotator cuff however this had to be postponed because he had a cardiac stent placed last year and was on a blood thinner.  He denies chest pain, shortness of breath, abdominal pain.      PCP: Tho Duran Jr, MD        Current Facility-Administered Medications   Medication Dose Route Frequency Provider Last Rate Last Admin    ketorolac injection 15 mg  15 mg Intramuscular ED 1 Time Tera Whittington MD        LIDOcaine 5 % patch 1 patch  1 patch Transdermal ED 1 Time Tera Whittington MD         Current Outpatient Medications   Medication Sig Dispense Refill    amlodipine (NORVASC) 10 MG tablet Take 10 mg by mouth once daily.      aspirin (ECOTRIN) 81 MG EC tablet Take 1 tablet (81 mg total) by mouth once. for 1 dose  0    bempedoic acid (NEXLETOL) 180 mg Tab Take 1 tablet (180 mg total) by mouth once daily. 30 tablet 5    cholecalciferol, vitamin D3, (VITAMIN D3) 10 mcg (400 unit)  Cap Take 400 Units by mouth once daily.      clopidogreL (PLAVIX) 75 mg tablet Take 1 tablet (75 mg total) by mouth once daily. 30 tablet 11    cyclobenzaprine (FLEXERIL) 10 MG tablet Take 10 mg by mouth 3 (three) times daily as needed for Muscle spasms.      diclofenac sodium (VOLTAREN) 1 % Gel Apply 2 g topically 4 (four) times daily as needed (Pain). 50 g 0    gabapentin (NEURONTIN) 600 MG tablet Take 0.5 tablets (300 mg total) by mouth 3 (three) times daily as needed. (Patient taking differently: Take 300 mg by mouth 3 (three) times daily. )      hydrochlorothiazide (HYDRODIURIL) 25 MG tablet Take 25 mg by mouth once daily.      Lactobac. rhamnosus GG-inulin (CULTURELLE DIGESTIVE HEALTH) 10 billion cell -200 mg Cap Take 1 capsule by mouth once daily. 30 capsule 0    LIDOcaine (LIDODERM) 5 % Place 1 patch onto the skin daily as needed (Pain). Remove & Discard patch within 12 hours or as directed by MD 10 patch 0    metoprolol succinate (TOPROL-XL) 50 MG 24 hr tablet TAKE 2 TABLETS(100 MG) BY MOUTH EVERY  tablet 1    multivitamin (THERAGRAN) per tablet Take 1 tablet by mouth once daily.      olmesartan (BENICAR) 40 MG tablet Take 40 mg by mouth once daily.      rosuvastatin (CRESTOR) 40 MG Tab TAKE 1 TABLET BY MOUTH DAILY 30 tablet 5    simethicone (MYLICON) 80 MG chewable tablet Take 1 tablet (80 mg total) by mouth every 6 (six) hours as needed for Flatulence. 30 tablet 0           Past History     Past Medical History:   Past Medical History:   Diagnosis Date    BPH (benign prostatic hyperplasia)     Coronary artery disease     PTCA STENT RCA/CFX 2014 and CFX 2021    Diabetes mellitus     NO DM MEDS- PATIENT DENIES    Digestive disorder     Diverticulitis 3/2/2022    Encounter for blood transfusion     GERD (gastroesophageal reflux disease)     Gout     High cholesterol     Hypertension     Kidney cysts     UTI (urinary tract infection)         Past Surgical History:   Past  "Surgical History:   Procedure Laterality Date    ANTERIOR CERVICAL DISCECTOMY W/ FUSION      cardiac stents      CATARACT EXTRACTION, BILATERAL      COLONOSCOPY      COLONOSCOPY N/A 2017    Procedure: COLONOSCOPY;  Surgeon: Luis Bogran-Reyes, MD;  Location: [facility];  Service: Endoscopy;  Laterality: N/A;    CORONARY ANGIOGRAPHY N/A 2021    Procedure: ANGIOGRAM, CORONARY ARTERY;  Surgeon: James Nash MD;  Location: [facility] CATH;  Service: Cardiology;  Laterality: N/A;    ESOPHAGOGASTRODUODENOSCOPY      LUMBAR LAMINECTOMY  2012    L2-4, Dr. Stover    PROSTATE SURGERY          Family History:   Family History   Problem Relation Age of Onset    Stroke Mother     Hypertension Mother     Cataracts Father     Thyroid disease Sister     Blindness Brother     Stroke Sister         Social History:   Social History     Tobacco Use    Smoking status: Former Smoker     Quit date: 1980     Years since quittin.4    Smokeless tobacco: Never Used   Substance Use Topics    Alcohol use: Yes     Comment: SELDOM    Drug use: No        Allergies:   Review of patient's allergies indicates:   Allergen Reactions    Penicillins Rash          Review of Systems   Review of Systems   Constitutional: Negative for chills and fever.   Respiratory: Negative for cough, chest tightness and shortness of breath.    Cardiovascular: Negative for chest pain.   Gastrointestinal: Negative for nausea and vomiting.   Musculoskeletal: Positive for arthralgias, back pain, neck pain and neck stiffness.   All other systems reviewed and are negative.               Physical Exam     Vitals:    22 0512 22 0526   BP: (!) 175/94    BP Location: Right arm    Patient Position: Sitting    Pulse: 90    Resp: 18    Temp: 98.6 °F (37 °C)    TempSrc: Oral    SpO2: 96%    Weight:  80.6 kg (177 lb 9.6 oz)   Height:  6' 1" (1.854 m)      Physical Exam  Vitals and nursing note reviewed.   Constitutional:  "      General: He is not in acute distress.     Appearance: Normal appearance. He is well-developed. He is not ill-appearing.   HENT:      Head: Normocephalic and atraumatic.      Nose: No congestion or rhinorrhea.   Eyes:      Conjunctiva/sclera: Conjunctivae normal.      Pupils: Pupils are equal, round, and reactive to light.   Cardiovascular:      Rate and Rhythm: Regular rhythm.      Pulses: Normal pulses.      Heart sounds: Normal heart sounds.   Pulmonary:      Effort: Pulmonary effort is normal. No respiratory distress.      Breath sounds: Normal breath sounds.   Abdominal:      Palpations: Abdomen is soft.   Musculoskeletal:         General: No deformity or signs of injury.      Right shoulder: Tenderness present.      Left shoulder: Tenderness present. Decreased range of motion.      Cervical back: Full passive range of motion without pain, normal range of motion and neck supple. Tenderness present. No deformity or rigidity.      Thoracic back: Tenderness present.        Back:       Comments: Tender to palpation   Skin:     General: Skin is warm and dry.      Coloration: Skin is not pale.      Findings: No lesion.   Neurological:      General: No focal deficit present.      Mental Status: He is alert and oriented to person, place, and time.      Cranial Nerves: No cranial nerve deficit.      Sensory: No sensory deficit.      Motor: No weakness.   Psychiatric:         Mood and Affect: Mood normal.         Behavior: Behavior normal.              Diagnostic Study Results      Labs -   No results found for this or any previous visit (from the past 12 hour(s)).     Radiologic Studies -    No orders to display        Medications given in the ED-   Medications   ketorolac injection 15 mg (has no administration in time range)   LIDOcaine 5 % patch 1 patch (has no administration in time range)           Medical Decision Making    I am the first provider for this patient.     I reviewed the vital signs, available  nursing notes, past medical history, past surgical history, family history and social history.     Vital Signs:  Reviewed the patient's vital signs.     Pulse Oximetry Analysis and Interpretation:    96% on Room Air, normal      Records Reviewed: Nursing notes.        Provider Notes (Medical Decision Making): James Baltazar is a 76 y.o. male here with musculoskeletal upper back and shoulder pain.  Pain is chronic in nature and is followed by orthopedic surgery for this.  He has not seen pain management for this.  No associated cardiopulmonary symptoms.  Patient has history of AAA reviewed imaging patient with stable 3.1 cm aorta from imaging in February.    Procedures:   Procedures      ED Course:    Will treat symptomatically for MSK pain.  Symptoms are not suspicious for ACS, PE, pleurisy, or vascular catastrophe.  Patient otherwise well appearing no could acute distress and symptoms are reproducible body motion and palpation.  Advised against long-term NSAID use given patient's cardiac history will provide topical alternative.  Advised follow-up with patient's primary care provider.           Diagnosis and Disposition     Critical Care:      DISCHARGE NOTE:       James Baltazar's  results have been reviewed with him.  He has been counseled regarding his diagnosis, treatment, and plan.  He verbally conveys understanding and agreement of the signs, symptoms, diagnosis, treatment and prognosis and additionally agrees to follow up as discussed.  He also agrees with the care-plan and conveys that all of his questions have been answered.  I have also provided discharge instructions for him that include: educational information regarding their diagnosis and treatment, and list of reasons why they would want to return to the ED prior to their follow-up appointment, should his condition change. He has been provided with education for proper emergency department utilization.         CLINICAL IMPRESSION:         1. Chronic  left shoulder pain    2. Upper back pain              PLAN:   1. Discharge Home  2.      Medication List      START taking these medications    diclofenac sodium 1 % Gel  Commonly known as: VOLTAREN  Apply 2 g topically 4 (four) times daily as needed (Pain).     LIDOcaine 5 %  Commonly known as: LIDODERM  Place 1 patch onto the skin daily as needed (Pain). Remove & Discard patch within 12 hours or as directed by MD        ASK your doctor about these medications    amLODIPine 10 MG tablet  Commonly known as: NORVASC     aspirin 81 MG EC tablet  Commonly known as: ECOTRIN  Take 1 tablet (81 mg total) by mouth once. for 1 dose     clopidogreL 75 mg tablet  Commonly known as: PLAVIX  Take 1 tablet (75 mg total) by mouth once daily.     [facility] DIGESTIVE HEALTH 10 billion cell -200 mg Cap  Generic drug: Lactobac. rhamnosus GG-inulin  Take 1 capsule by mouth once daily.     cyclobenzaprine 10 MG tablet  Commonly known as: FLEXERIL     gabapentin 600 MG tablet  Commonly known as: NEURONTIN  Take 0.5 tablets (300 mg total) by mouth 3 (three) times daily as needed.     hydroCHLOROthiazide 25 MG tablet  Commonly known as: HYDRODIURIL     metoprolol succinate 50 MG 24 hr tablet  Commonly known as: TOPROL-XL  TAKE 2 TABLETS(100 MG) BY MOUTH EVERY DAY     multivitamin per tablet  Commonly known as: THERAGRAN     NEXLETOL 180 mg Tab  Generic drug: bempedoic acid  Take 1 tablet (180 mg total) by mouth once daily.     olmesartan 40 MG tablet  Commonly known as: BENICAR     rosuvastatin 40 MG Tab  Commonly known as: CRESTOR  TAKE 1 TABLET BY MOUTH DAILY     simethicone 80 MG chewable tablet  Commonly known as: MYLICON  Take 1 tablet (80 mg total) by mouth every 6 (six) hours as needed for Flatulence.     VITAMIN D3 10 mcg (400 unit) Cap capsule  Generic drug: cholecalciferol (vitamin D3)           Where to Get Your Medications      These medications were [facility] to [id number] - [place], [place] - [facility] [address] AT  [address]  [address], [facility] [address]    Phone: [phone]   · diclofenac sodium 1 % Gel  · LIDOcaine 5 %        3. Tho Duran Jr., MD  [address] [facility] [address]  [phone]    Schedule an appointment as soon as possible for a visit   Primary care follow up    [facility] Emergency Department  [address] [place] [id number] [phone]  Go to   If symptoms worsen       _______________________________     Please note that this dictation was completed with DescribeMe, the computer voice recognition software.  Quite often unanticipated grammatical, syntax, homophones, and other interpretive errors are inadvertently transcribed by the computer software.  Please disregard these errors.  Please excuse any errors that have escaped final proofreading.             Tera Whittington MD  05/01/22 0560

## 2022-09-02 ENCOUNTER — LAB VISIT (OUTPATIENT)
Dept: LAB | Facility: HOSPITAL | Age: 77
End: 2022-09-02
Attending: INTERNAL MEDICINE
Payer: MEDICARE

## 2022-09-02 DIAGNOSIS — Q61.3 POLYCYSTIC KIDNEY, UNSPECIFIED: ICD-10-CM

## 2022-09-02 DIAGNOSIS — N18.31 CHRONIC KIDNEY DISEASE, STAGE 3A: Primary | ICD-10-CM

## 2022-09-02 DIAGNOSIS — I12.9 HYPERTENSIVE KIDNEY DISEASE WITH CHRONIC KIDNEY DISEASE: ICD-10-CM

## 2022-09-02 LAB
ALBUMIN SERPL BCP-MCNC: 3.4 G/DL (ref 3.5–5.2)
ALP SERPL-CCNC: 111 U/L (ref 55–135)
ALT SERPL W/O P-5'-P-CCNC: 20 U/L (ref 10–44)
ANION GAP SERPL CALC-SCNC: 6 MMOL/L (ref 8–16)
AST SERPL-CCNC: 16 U/L (ref 10–40)
BASOPHILS # BLD AUTO: 0.01 K/UL (ref 0–0.2)
BASOPHILS NFR BLD: 0.1 % (ref 0–1.9)
BILIRUB SERPL-MCNC: 0.5 MG/DL (ref 0.1–1)
BUN SERPL-MCNC: 24 MG/DL (ref 8–23)
CALCIUM SERPL-MCNC: 9.1 MG/DL (ref 8.7–10.5)
CHLORIDE SERPL-SCNC: 107 MMOL/L (ref 95–110)
CO2 SERPL-SCNC: 25 MMOL/L (ref 23–29)
CREAT SERPL-MCNC: 1.8 MG/DL (ref 0.5–1.4)
DIFFERENTIAL METHOD: ABNORMAL
EOSINOPHIL # BLD AUTO: 0 K/UL (ref 0–0.5)
EOSINOPHIL NFR BLD: 0 % (ref 0–8)
ERYTHROCYTE [DISTWIDTH] IN BLOOD BY AUTOMATED COUNT: 14 % (ref 11.5–14.5)
EST. GFR  (NO RACE VARIABLE): 38.8 ML/MIN/1.73 M^2
GLUCOSE SERPL-MCNC: 90 MG/DL (ref 70–110)
HCT VFR BLD AUTO: 37.6 % (ref 40–54)
HGB BLD-MCNC: 12.6 G/DL (ref 14–18)
IMM GRANULOCYTES # BLD AUTO: 0.05 K/UL (ref 0–0.04)
IMM GRANULOCYTES NFR BLD AUTO: 0.7 % (ref 0–0.5)
LYMPHOCYTES # BLD AUTO: 1.7 K/UL (ref 1–4.8)
LYMPHOCYTES NFR BLD: 25.3 % (ref 18–48)
MCH RBC QN AUTO: 28 PG (ref 27–31)
MCHC RBC AUTO-ENTMCNC: 33.5 G/DL (ref 32–36)
MCV RBC AUTO: 84 FL (ref 82–98)
MONOCYTES # BLD AUTO: 0.7 K/UL (ref 0.3–1)
MONOCYTES NFR BLD: 10.4 % (ref 4–15)
NEUTROPHILS # BLD AUTO: 4.4 K/UL (ref 1.8–7.7)
NEUTROPHILS NFR BLD: 63.5 % (ref 38–73)
NRBC BLD-RTO: 0 /100 WBC
PLATELET # BLD AUTO: 199 K/UL (ref 150–450)
PMV BLD AUTO: 10 FL (ref 9.2–12.9)
POTASSIUM SERPL-SCNC: 3.9 MMOL/L (ref 3.5–5.1)
PROT SERPL-MCNC: 7.7 G/DL (ref 6–8.4)
RBC # BLD AUTO: 4.5 M/UL (ref 4.6–6.2)
SODIUM SERPL-SCNC: 138 MMOL/L (ref 136–145)
WBC # BLD AUTO: 6.85 K/UL (ref 3.9–12.7)

## 2022-09-02 PROCEDURE — 80053 COMPREHEN METABOLIC PANEL: CPT | Performed by: INTERNAL MEDICINE

## 2022-09-02 PROCEDURE — 36415 COLL VENOUS BLD VENIPUNCTURE: CPT | Performed by: INTERNAL MEDICINE

## 2022-09-02 PROCEDURE — 85025 COMPLETE CBC W/AUTO DIFF WBC: CPT | Performed by: INTERNAL MEDICINE

## 2022-10-21 ENCOUNTER — HOSPITAL ENCOUNTER (EMERGENCY)
Facility: HOSPITAL | Age: 77
Discharge: HOME | End: 2022-10-21
Attending: EMERGENCY MEDICINE
Payer: MEDICARE

## 2022-10-21 VITALS
OXYGEN SATURATION: 98 % | DIASTOLIC BLOOD PRESSURE: 84 MMHG | TEMPERATURE: 98 F | HEART RATE: 70 BPM | SYSTOLIC BLOOD PRESSURE: 150 MMHG | RESPIRATION RATE: 18 BRPM

## 2022-10-21 DIAGNOSIS — R11.2 NAUSEA AND VOMITING, UNSPECIFIED VOMITING TYPE: Primary | ICD-10-CM

## 2022-10-21 DIAGNOSIS — R10.10 PAIN OF UPPER ABDOMEN: ICD-10-CM

## 2022-10-21 LAB
ALBUMIN SERPL BCP-MCNC: 4.1 G/DL (ref 3.5–5.2)
ALP SERPL-CCNC: 134 U/L (ref 55–135)
ALT SERPL W/O P-5'-P-CCNC: 37 U/L (ref 10–44)
ANION GAP SERPL CALC-SCNC: 5 MMOL/L (ref 8–16)
AST SERPL-CCNC: 42 U/L (ref 10–40)
BASOPHILS # BLD AUTO: 0.01 K/UL (ref 0–0.2)
BASOPHILS NFR BLD: 0.1 % (ref 0–1.9)
BILIRUB SERPL-MCNC: 0.6 MG/DL (ref 0.1–1)
BILIRUB UR QL STRIP: NEGATIVE
BUN SERPL-MCNC: 22 MG/DL (ref 8–23)
CALCIUM SERPL-MCNC: 9.6 MG/DL (ref 8.7–10.5)
CHLORIDE SERPL-SCNC: 107 MMOL/L (ref 95–110)
CLARITY UR: CLEAR
CO2 SERPL-SCNC: 27 MMOL/L (ref 23–29)
COLOR UR: YELLOW
CREAT SERPL-MCNC: 1.7 MG/DL (ref 0.5–1.4)
DIFFERENTIAL METHOD: ABNORMAL
EOSINOPHIL # BLD AUTO: 0 K/UL (ref 0–0.5)
EOSINOPHIL NFR BLD: 0 % (ref 0–8)
ERYTHROCYTE [DISTWIDTH] IN BLOOD BY AUTOMATED COUNT: 13.3 % (ref 11.5–14.5)
EST. GFR  (NO RACE VARIABLE): 41.5 ML/MIN/1.73 M^2
GLUCOSE SERPL-MCNC: 123 MG/DL (ref 70–110)
GLUCOSE UR QL STRIP: NEGATIVE
HCT VFR BLD AUTO: 39 % (ref 40–54)
HGB BLD-MCNC: 13.2 G/DL (ref 14–18)
HGB UR QL STRIP: NEGATIVE
IMM GRANULOCYTES # BLD AUTO: 0.01 K/UL (ref 0–0.04)
IMM GRANULOCYTES NFR BLD AUTO: 0.1 % (ref 0–0.5)
KETONES UR QL STRIP: NEGATIVE
LEUKOCYTE ESTERASE UR QL STRIP: NEGATIVE
LIPASE SERPL-CCNC: 128 U/L (ref 23–300)
LYMPHOCYTES # BLD AUTO: 1.4 K/UL (ref 1–4.8)
LYMPHOCYTES NFR BLD: 20.3 % (ref 18–48)
MCH RBC QN AUTO: 28.2 PG (ref 27–31)
MCHC RBC AUTO-ENTMCNC: 33.8 G/DL (ref 32–36)
MCV RBC AUTO: 83 FL (ref 82–98)
MONOCYTES # BLD AUTO: 0.5 K/UL (ref 0.3–1)
MONOCYTES NFR BLD: 8 % (ref 4–15)
NEUTROPHILS # BLD AUTO: 4.9 K/UL (ref 1.8–7.7)
NEUTROPHILS NFR BLD: 71.5 % (ref 38–73)
NITRITE UR QL STRIP: NEGATIVE
NRBC BLD-RTO: 0 /100 WBC
PH UR STRIP: 6 [PH] (ref 5–8)
PLATELET # BLD AUTO: 161 K/UL (ref 150–450)
PMV BLD AUTO: 10.7 FL (ref 9.2–12.9)
POTASSIUM SERPL-SCNC: 4.6 MMOL/L (ref 3.5–5.1)
PROT SERPL-MCNC: 8.8 G/DL (ref 6–8.4)
PROT UR QL STRIP: NEGATIVE
RBC # BLD AUTO: 4.68 M/UL (ref 4.6–6.2)
SODIUM SERPL-SCNC: 139 MMOL/L (ref 136–145)
SP GR UR STRIP: 1.01 (ref 1–1.03)
URN SPEC COLLECT METH UR: NORMAL
UROBILINOGEN UR STRIP-ACNC: NEGATIVE EU/DL
WBC # BLD AUTO: 6.79 K/UL (ref 3.9–12.7)

## 2022-10-21 PROCEDURE — 96372 THER/PROPH/DIAG INJ SC/IM: CPT | Performed by: NURSE PRACTITIONER

## 2022-10-21 PROCEDURE — 81003 URINALYSIS AUTO W/O SCOPE: CPT | Performed by: NURSE PRACTITIONER

## 2022-10-21 PROCEDURE — 83690 ASSAY OF LIPASE: CPT | Performed by: NURSE PRACTITIONER

## 2022-10-21 PROCEDURE — 96374 THER/PROPH/DIAG INJ IV PUSH: CPT

## 2022-10-21 PROCEDURE — 85025 COMPLETE CBC W/AUTO DIFF WBC: CPT | Performed by: NURSE PRACTITIONER

## 2022-10-21 PROCEDURE — 63600175 PHARM REV CODE 636 W HCPCS: Performed by: NURSE PRACTITIONER

## 2022-10-21 PROCEDURE — 25000003 PHARM REV CODE 250: Performed by: NURSE PRACTITIONER

## 2022-10-21 PROCEDURE — 99284 EMERGENCY DEPT VISIT MOD MDM: CPT | Mod: 25

## 2022-10-21 PROCEDURE — 36415 COLL VENOUS BLD VENIPUNCTURE: CPT | Performed by: NURSE PRACTITIONER

## 2022-10-21 PROCEDURE — 80053 COMPREHEN METABOLIC PANEL: CPT | Performed by: NURSE PRACTITIONER

## 2022-10-21 PROCEDURE — 96361 HYDRATE IV INFUSION ADD-ON: CPT

## 2022-10-21 RX ORDER — DICYCLOMINE HYDROCHLORIDE 20 MG/1
20 TABLET ORAL 2 TIMES DAILY
Qty: 6 TABLET | Refills: 0 | Status: SHIPPED | OUTPATIENT
Start: 2022-10-21 | End: 2022-10-21 | Stop reason: CLARIF

## 2022-10-21 RX ORDER — ONDANSETRON 4 MG/1
4 TABLET, ORALLY DISINTEGRATING ORAL EVERY 8 HOURS PRN
Qty: 6 TABLET | Refills: 0 | Status: SHIPPED | OUTPATIENT
Start: 2022-10-21 | End: 2022-10-23

## 2022-10-21 RX ORDER — ONDANSETRON 2 MG/ML
4 INJECTION INTRAMUSCULAR; INTRAVENOUS
Status: COMPLETED | OUTPATIENT
Start: 2022-10-21 | End: 2022-10-21

## 2022-10-21 RX ORDER — DICYCLOMINE HYDROCHLORIDE 10 MG/ML
20 INJECTION INTRAMUSCULAR
Status: COMPLETED | OUTPATIENT
Start: 2022-10-21 | End: 2022-10-21

## 2022-10-21 RX ADMIN — SODIUM CHLORIDE 1000 ML: 0.9 INJECTION, SOLUTION INTRAVENOUS at 03:10

## 2022-10-21 RX ADMIN — DICYCLOMINE HYDROCHLORIDE 20 MG: 20 INJECTION, SOLUTION INTRAMUSCULAR at 02:10

## 2022-10-21 RX ADMIN — ONDANSETRON HYDROCHLORIDE 4 MG: 2 SOLUTION INTRAMUSCULAR; INTRAVENOUS at 02:10

## 2022-10-21 NOTE — ED PROVIDER NOTES
Encounter Date: 10/21/2022       History     Chief Complaint   Patient presents with    Abdominal Pain     Pt reports abdominal pain starting about an hour ago with N/V, denies diarrhea. States pain is in center of back as well.      This is a 76-year-old black male with history of BPH, GERD, and hypertension who presents to the emergency department with complaints of vomiting that began this morning.  Patient reports that he has been experiencing nausea and vomiting since taking a pain pill, as prescribed for C-spine surgery 2 months ago.  He attributes his symptoms to not eating or drinking anything upon taking the medication.  He reports mild upper abdominal pain, however denies any other symptoms.  Denies fever, URI signs and symptoms, lower abdominal pain, diarrhea, constipation, or known exposure to other individuals with similar symptoms.    Review of patient's allergies indicates:   Allergen Reactions    Aspirin Other (See Comments)     GI intolerant     Bidil [isosorbide-hydralazine] Other (See Comments)     Tounge burning       Penicillins Rash    Praluent pen [alirocumab] Rash    Statins-hmg-coa reductase inhibitors     Zetia [ezetimibe] Other (See Comments)     Past Medical History:   Diagnosis Date    Abnormal myocardial perfusion study     Arthritis     BPH (benign prostatic hyperplasia)     Coronary artery disease     PTCA STENT RCA/CFX 2014 and CFX 2021    Digestive disorder     Diverticulitis 3/2/2022    Encounter for blood transfusion     GERD (gastroesophageal reflux disease)     Gout     H/O heart artery stent     High cholesterol     Hypercholesteremia     Hypertension     Kidney cysts     UTI (urinary tract infection)      Past Surgical History:   Procedure Laterality Date    ANTERIOR CERVICAL DISCECTOMY W/ FUSION  2007    BACK SURGERY      cardiac stents      CATARACT EXTRACTION, BILATERAL      COLONOSCOPY      COLONOSCOPY N/A 5/29/2017    Procedure: COLONOSCOPY;  Surgeon: Luis Bogran-Reyes,  MD;  Location: [facility] ENDO;  Service: Endoscopy;  Laterality: N/A;    CORONARY ANGIOGRAPHY N/A 2021    Procedure: ANGIOGRAM, CORONARY ARTERY;  Surgeon: James Nash MD;  [facility];  Service: Cardiology;  Laterality: N/A;    ESOPHAGOGASTRODUODENOSCOPY      EYE SURGERY      Bilateral cataract     FACETECTOMY OF VERTEBRA Left 2022    Procedure: FACETECTOMY, PARTIAL MEDIAL;  Surgeon: Hunter Langston MD;  [facility];  Service: Orthopedics;  Laterality: Left;    FORAMINOTOMY Left 2022    Procedure: FORAMINOTOMY, SPINE;  Surgeon: Hunter Langston MD;  [facility];  Service: Orthopedics;  Laterality: Left;    LAMINOTOMY Left 2022    Procedure: LAMINOTOMY, POSTERIOR, CERVICAL, LEFT C6-C7;  Surgeon: Hunter Langston MD;  [facility];  Service: Orthopedics;  Laterality: Left;    LUMBAR LAMINECTOMY  2012    L2-4, Dr. Stover    PROSTATE SURGERY       Family History   Problem Relation Age of Onset    Stroke Mother     Hypertension Mother     Heart disease Mother     Cataracts Father     Heart disease Father     Thyroid disease Sister     Blindness Brother     Heart disease Brother     Stroke Sister      Social History     Tobacco Use    Smoking status: Former     Types: Cigarettes     Quit date: 1980     Years since quittin.9    Smokeless tobacco: Never   Substance Use Topics    Alcohol use: Yes     Comment: SELDOM    Drug use: No     Review of Systems   Constitutional:  Negative for fever.   HENT: Negative.     Respiratory: Negative.     Cardiovascular: Negative.    Gastrointestinal:  Positive for abdominal pain, nausea and vomiting. Negative for blood in stool, constipation and diarrhea.   Neurological:  Negative for dizziness and light-headedness.     Physical Exam     Initial Vitals [10/21/22 1340]   BP Pulse Resp Temp SpO2   (!) 177/83 72 (!) 22 97.8 °F (36.6 °C) 97 %      MAP       --         Physical Exam    Nursing note and vitals  reviewed.  Constitutional: He appears well-developed and well-nourished. He is active. No distress.   HENT:   Head: Normocephalic and atraumatic.   Eyes: EOM are normal. Pupils are equal, round, and reactive to light.   Neck: Neck supple.   Normal range of motion.  Cardiovascular:  Normal rate, regular rhythm and normal heart sounds.           Pulmonary/Chest: Breath sounds normal. No respiratory distress.   Abdominal: Abdomen is soft. Bowel sounds are normal. He exhibits no distension. There is no abdominal tenderness.   Musculoskeletal:         General: Normal range of motion.      Cervical back: Normal range of motion and neck supple.     Neurological: He is alert and oriented to person, place, and time. GCS score is 15. GCS eye subscore is 4. GCS verbal subscore is 5. GCS motor subscore is 6.   Skin: Skin is warm and dry. Capillary refill takes less than 2 seconds.   Psychiatric: He has a normal mood and affect. His behavior is normal. Thought content normal.       ED Course   Procedures  Labs Reviewed   CBC W/ AUTO DIFFERENTIAL - Abnormal; Notable for the following components:       Result Value    Hemoglobin 13.2 (*)     Hematocrit 39.0 (*)     All other components within normal limits   COMPREHENSIVE METABOLIC PANEL - Abnormal; Notable for the following components:    Glucose 123 (*)     Creatinine 1.7 (*)     Total Protein 8.8 (*)     AST 42 (*)     Anion Gap 5 (*)     eGFR 41.5 (*)     All other components within normal limits   LIPASE   URINALYSIS, REFLEX TO URINE CULTURE    Narrative:     Preferred Collection Type->Urine, Clean Catch  Specimen Source->Urine          Imaging Results    None          Medications   sodium chloride 0.9% bolus 1,000 mL (1,000 mLs Intravenous New Bag 10/21/22 1500)   dicyclomine injection 20 mg (20 mg Intramuscular Given 10/21/22 1425)   ondansetron injection 4 mg (4 mg Intravenous Given 10/21/22 1425)                 ED Course as of 10/21/22 1524   Fri Oct 21, 2022   4311  Patient reports improvement in symptoms after receiving Zofran and Bentyl.  All labs relatively unremarkable, creatinine at baseline.  Patient asking for discharge in understands to return for worsening symptoms. [CB]      ED Course User Index  [CB] Rosalind Shook NP                 Clinical Impression:   Final diagnoses:  [R11.2] Nausea and vomiting, unspecified vomiting type (Primary)  [R10.10] Pain of upper abdomen      ED Disposition Condition    Discharge Stable          ED Prescriptions       Medication Sig Dispense Start Date End Date Auth. Provider    dicyclomine (BENTYL) 20 mg tablet  (Status: Discontinued) Take 1 tablet (20 mg total) by mouth 2 (two) times daily. for 3 days 6 tablet 10/21/2022 10/21/2022 Rosalind Shook NP    ondansetron (ZOFRAN-ODT) 4 MG TbDL Take 1 tablet (4 mg total) by mouth every 8 (eight) hours as needed. 6 tablet 10/21/2022 10/23/2022 Rosalind Shook NP          Follow-up Information       Follow up With Specialties Details Why Contact Info    PCP Follow UP  Schedule an appointment as soon as possible for a visit in 2 days for follow-up, for re-evaluation of today's complaint              Rosalind Shook NP  10/21/22 2613

## 2022-10-21 NOTE — ED NOTES
Patient states he feels better. States nausea is gone and pain is better. BABAK Boyer at bedside speaking with patient and family.

## 2022-10-21 NOTE — DISCHARGE INSTRUCTIONS
Take medication as directed and be sure to follow a bland diet while experiencing symptoms of vomiting or diarrhea.  See your primary doctor in 1-2 days, and return to the emergency room for worsening condition.

## 2022-12-07 ENCOUNTER — HOSPITAL ENCOUNTER (OUTPATIENT)
Dept: RADIOLOGY | Facility: HOSPITAL | Age: 77
Discharge: HOME | End: 2022-12-07
Attending: INTERNAL MEDICINE
Payer: MEDICARE

## 2022-12-07 DIAGNOSIS — I12.9 HYPERTENSIVE CHRONIC KIDNEY DISEASE WITH STAGE 1 THROUGH STAGE 4 CHRONIC KIDNEY DISEASE, OR UNSPECIFIED CHRONIC KIDNEY DISEASE: ICD-10-CM

## 2022-12-07 DIAGNOSIS — Q61.3 POLYCYSTIC KIDNEY, UNSPECIFIED: ICD-10-CM

## 2022-12-07 PROCEDURE — 76770 US EXAM ABDO BACK WALL COMP: CPT | Mod: TC

## 2023-01-18 ENCOUNTER — HOSPITAL ENCOUNTER (EMERGENCY)
Facility: HOSPITAL | Age: 78
Discharge: HOME | End: 2023-01-18
Attending: EMERGENCY MEDICINE
Payer: MEDICARE

## 2023-01-18 VITALS
SYSTOLIC BLOOD PRESSURE: 147 MMHG | TEMPERATURE: 98 F | WEIGHT: 176 LBS | HEART RATE: 105 BPM | RESPIRATION RATE: 18 BRPM | BODY MASS INDEX: 23.33 KG/M2 | DIASTOLIC BLOOD PRESSURE: 99 MMHG | OXYGEN SATURATION: 96 % | HEIGHT: 73 IN

## 2023-01-18 DIAGNOSIS — M54.2 CHRONIC NECK PAIN: Primary | ICD-10-CM

## 2023-01-18 DIAGNOSIS — G89.29 CHRONIC NECK PAIN: Primary | ICD-10-CM

## 2023-01-18 PROCEDURE — 99284 EMERGENCY DEPT VISIT MOD MDM: CPT

## 2023-01-18 PROCEDURE — 25000003 PHARM REV CODE 250: Performed by: EMERGENCY MEDICINE

## 2023-01-18 RX ORDER — LIDOCAINE 50 MG/G
1 PATCH TOPICAL DAILY PRN
Qty: 10 PATCH | Refills: 0 | Status: SHIPPED | OUTPATIENT
Start: 2023-01-18 | End: 2023-01-28

## 2023-01-18 RX ORDER — DICLOFENAC SODIUM 10 MG/G
2 GEL TOPICAL 4 TIMES DAILY PRN
Qty: 50 G | Refills: 0 | Status: ON HOLD | OUTPATIENT
Start: 2023-01-18 | End: 2023-02-06

## 2023-01-18 RX ORDER — ACETAMINOPHEN 500 MG
1000 TABLET ORAL
Status: COMPLETED | OUTPATIENT
Start: 2023-01-18 | End: 2023-01-18

## 2023-01-18 RX ADMIN — ACETAMINOPHEN 1000 MG: 500 TABLET ORAL at 10:01

## 2023-01-19 NOTE — ED PROVIDER NOTES
EMERGENCY DEPARTMENT HISTORY AND PHYSICAL EXAM     This note is dictated on M*Modal word recognition program.  There are word recognition mistakes and grammatical errors that are occasionally missed on review.     Date: 1/18/2023   Patient Name: James Baltazar       History of Presenting Illness      Chief Complaint   Patient presents with    Back Pain     Pt stated that he has been experiencing upper back/neck pain down left arm for the past few months - worsened over the past couple days. Denied recent trauma/injury.            James Baltazar is a 77 y.o. male with PMHX of hypertension, hyperlipidemia, AAA, CKD, chronic back pain, chronic neck pain who presents to the emergency department C/O neck pain.    Patient reports acute on chronic neck pain.  Middle of neck moves down to both shoulders.  Worse with body position and movement.  Reports the symptoms are chronic in nature however been worse over the past few days.      PCP: Tho Duran Jr, MD        No current facility-administered medications for this encounter.     Current Outpatient Medications   Medication Sig Dispense Refill    amlodipine (NORVASC) 10 MG tablet Take 10 mg by mouth once daily.      diclofenac sodium (VOLTAREN) 1 % Gel Apply 2 g topically 4 (four) times daily as needed (Pain). 50 g 0    diclofenac sodium (VOLTAREN) 1 % Gel Apply 2 g topically 4 (four) times daily as needed (Pain). 50 g 0    gabapentin (NEURONTIN) 600 MG tablet Take 0.5 tablets (300 mg total) by mouth 3 (three) times daily as needed.      hydrochlorothiazide (HYDRODIURIL) 25 MG tablet Take 25 mg by mouth once daily.      LIDOcaine (LIDODERM) 5 % Place 1 patch onto the skin daily as needed (Pain). Remove & Discard patch within 12 hours or as directed by MD 10 patch 0    metoprolol succinate (TOPROL-XL) 50 MG 24 hr tablet Take 2 tablets (100 mg total) by mouth once daily. 180 tablet 1    multivitamin (THERAGRAN) per tablet Take 1 tablet by mouth once daily.       olmesartan (BENICAR) 40 MG tablet Take 40 mg by mouth once daily.      rosuvastatin (CRESTOR) 40 MG Tab TAKE 1 TABLET BY MOUTH DAILY 30 tablet 5    simethicone (MYLICON) 80 MG chewable tablet Take 1 tablet (80 mg total) by mouth every 6 (six) hours as needed for Flatulence. 30 tablet 0           Past History     Past Medical History:   Past Medical History:   Diagnosis Date    Abnormal myocardial perfusion study     Arthritis     BPH (benign prostatic hyperplasia)     Coronary artery disease     PTCA STENT RCA/CFX 2014 and CFX 2021    Digestive disorder     Diverticulitis 3/2/2022    Encounter for blood transfusion     GERD (gastroesophageal reflux disease)     Gout     H/O heart artery stent     High cholesterol     Hypercholesteremia     Hypertension     Kidney cysts     UTI (urinary tract infection)         Past Surgical History:   Past Surgical History:   Procedure Laterality Date    ANTERIOR CERVICAL DISCECTOMY W/ FUSION  2007    BACK SURGERY      cardiac stents      CATARACT EXTRACTION, BILATERAL      COLONOSCOPY      COLONOSCOPY N/A 5/29/2017    Procedure: COLONOSCOPY;  Surgeon: Luis Bogran-Reyes, MD;  Location: [facility];  Service: Endoscopy;  Laterality: N/A;    CORONARY ANGIOGRAPHY N/A 2/2/2021    Procedure: ANGIOGRAM, CORONARY ARTERY;  Surgeon: James Nash MD;  Location: [facility] CATH;  Service: Cardiology;  Laterality: N/A;    ESOPHAGOGASTRODUODENOSCOPY      EYE SURGERY      Bilateral cataract     FACETECTOMY OF VERTEBRA Left 8/26/2022    Procedure: FACETECTOMY, PARTIAL MEDIAL;  Surgeon: Hunter Langston MD;  Location: [facility] OR;  Service: Orthopedics;  Laterality: Left;    FORAMINOTOMY Left 8/26/2022    Procedure: FORAMINOTOMY, SPINE;  Surgeon: Hunter Langston MD;  Location: [facility] OR;  Service: Orthopedics;  Laterality: Left;    LAMINOTOMY Left 8/26/2022    Procedure: LAMINOTOMY, POSTERIOR, CERVICAL, LEFT C6-C7;  Surgeon: Hunter Langston MD;  Location: [facility] OR;  Service: Orthopedics;  Laterality:  "Left;    LUMBAR LAMINECTOMY  2012    L2-4, Dr. Stover    PROSTATE SURGERY          Family History:   Family History   Problem Relation Age of Onset    Stroke Mother     Hypertension Mother     Heart disease Mother     Cataracts Father     Heart disease Father     Thyroid disease Sister     Blindness Brother     Heart disease Brother     Stroke Sister         Social History:   Social History     Tobacco Use    Smoking status: Former     Types: Cigarettes     Quit date: 1980     Years since quittin.1    Smokeless tobacco: Never   Substance Use Topics    Alcohol use: Yes     Comment: SELDOM    Drug use: No        Allergies:   Review of patient's allergies indicates:   Allergen Reactions    Aspirin Other (See Comments)     GI intolerant     Bidil [isosorbide-hydralazine] Other (See Comments)     Tounge burning       Penicillins Rash    Praluent pen [alirocumab] Rash    Statins-hmg-coa reductase inhibitors     Zetia [ezetimibe] Other (See Comments)          Review of Systems   Review of Systems   See HPI for pertinent positives and negatives       Physical Exam     Vitals:    23 0922   BP: (!) 147/99   Pulse: 105   Resp: 18   Temp: 97.8 °F (36.6 °C)   SpO2: 96%   Weight: 79.8 kg (176 lb)   Height: 6' 1" (1.854 m)      Physical Exam  Vitals and nursing note reviewed.   Constitutional:       General: He is not in acute distress.     Appearance: Normal appearance. He is well-developed. He is not ill-appearing.   HENT:      Head: Normocephalic and atraumatic.   Eyes:      Extraocular Movements: Extraocular movements intact.      Conjunctiva/sclera: Conjunctivae normal.   Pulmonary:      Effort: Pulmonary effort is normal. No respiratory distress.   Musculoskeletal:         General: No deformity or signs of injury. Normal range of motion.      Cervical back: Normal range of motion and neck supple. No rigidity. Pain with movement, spinous process tenderness and muscular tenderness present.   Skin:    "  General: Skin is dry.      Coloration: Skin is not pale.      Findings: No rash.   Neurological:      General: No focal deficit present.      Mental Status: He is alert and oriented to person, place, and time.      Cranial Nerves: No cranial nerve deficit.      Motor: No weakness.      Coordination: Coordination normal.            Diagnostic Study Results      Labs -   No results found for this or any previous visit (from the past 12 hour(s)).     Radiologic Studies -    No orders to display        Medications given in the ED-   Medications   acetaminophen tablet 1,000 mg (1,000 mg Oral Given 1/18/23 1009)           Medical Decision Making    I am the first provider for this patient.     I reviewed the vital signs, available nursing notes, past medical history, past surgical history, family history and social history.     Vital Signs:  Reviewed the patient's vital signs.     Pulse Oximetry Analysis and Interpretation:    96% on Room Air, normal        External Test Results (Pertinent to encounter):    Records Reviewed: Nursing Notes, Current Prescription Medications, Old Medical Records, and External Medical Records     History Obtained By: Patient    Provider Notes: James Baltazar is a 77 y.o. male with acute on chronic neck pain.  Patient with history of chronic pain multiple ED visits for similar complaints in past.  No new trauma or injury.  Overall benign physical exam.  Patient has follow-up with spine and pain management.      Procedures:   Procedures       Diagnosis and Disposition     Critical Care:      DISCHARGE NOTE:       James Baltazar's  results have been reviewed with him.  He has been counseled regarding his diagnosis, treatment, and plan.  He verbally conveys understanding and agreement of the signs, symptoms, diagnosis, treatment and prognosis and additionally agrees to follow up as discussed.  He also agrees with the care-plan and conveys that all of his questions have been answered.  I have  also provided discharge instructions for him that include: educational information regarding their diagnosis and treatment, and list of reasons why they would want to return to the ED prior to their follow-up appointment, should his condition change. He has been provided with education for proper emergency department utilization.         CLINICAL IMPRESSION:         1. Chronic neck pain              PLAN:   1. Discharge Home  2.      Medication List        START taking these medications      LIDOcaine 5 %  Commonly known as: LIDODERM  Place 1 patch onto the skin daily as needed (Pain). Remove & Discard patch within 12 hours or as directed by MD            CHANGE how you take these medications      * diclofenac sodium 1 % Gel  Commonly known as: VOLTAREN  Apply 2 g topically 4 (four) times daily as needed (Pain).  What changed: Another medication with the same name was added. Make sure you understand how and when to take each.     * diclofenac sodium 1 % Gel  Commonly known as: VOLTAREN  Apply 2 g topically 4 (four) times daily as needed (Pain).  What changed: You were already taking a medication with the same name, and this prescription was added. Make sure you understand how and when to take each.           * This list has 2 medication(s) that are the same as other medications prescribed for you. Read the directions carefully, and ask your doctor or other care provider to review them with you.                ASK your doctor about these medications      amLODIPine 10 MG tablet  Commonly known as: NORVASC     gabapentin 600 MG tablet  Commonly known as: NEURONTIN  Take 0.5 tablets (300 mg total) by mouth 3 (three) times daily as needed.     hydroCHLOROthiazide 25 MG tablet  Commonly known as: HYDRODIURIL     metoprolol succinate 50 MG 24 hr tablet  Commonly known as: TOPROL-XL  Take 2 tablets (100 mg total) by mouth once daily.     multivitamin per tablet  Commonly known as: THERAGRAN     olmesartan 40 MG  tablet  Commonly known as: BENICAR     rosuvastatin 40 MG Tab  Commonly known as: CRESTOR  TAKE 1 TABLET BY MOUTH DAILY     simethicone 80 MG chewable tablet  Commonly known as: MYLICON  Take 1 tablet (80 mg total) by mouth every 6 (six) hours as needed for Flatulence.               Where to Get Your Medications        These medications were sent to [facility]/pharmacy #[id number] - [facility] [address]  [address], [facility] [address]      Phone: [phone]   diclofenac sodium 1 % Gel  LIDOcaine 5 %        3. Tho Duran Jr., MD  [address] [place] [id number]  [phone]    Schedule an appointment as soon as possible for a visit          _______________________________     Please note that this dictation was completed with 20x200, the computer voice recognition software.  Quite often unanticipated grammatical, syntax, homophones, and other interpretive errors are inadvertently transcribed by the computer software.  Please disregard these errors.  Please excuse any errors that have escaped final proofreading.             Tera Whittington MD  01/19/23 0648

## 2023-02-10 ENCOUNTER — HOSPITAL ENCOUNTER (EMERGENCY)
Facility: HOSPITAL | Age: 78
Discharge: HOME | End: 2023-02-10
Attending: EMERGENCY MEDICINE
Payer: MEDICARE

## 2023-02-10 VITALS
HEIGHT: 73 IN | TEMPERATURE: 99 F | RESPIRATION RATE: 18 BRPM | BODY MASS INDEX: 25.84 KG/M2 | WEIGHT: 195 LBS | DIASTOLIC BLOOD PRESSURE: 77 MMHG | OXYGEN SATURATION: 95 % | SYSTOLIC BLOOD PRESSURE: 170 MMHG | HEART RATE: 71 BPM

## 2023-02-10 DIAGNOSIS — R11.2 NAUSEA AND VOMITING, UNSPECIFIED VOMITING TYPE: Primary | ICD-10-CM

## 2023-02-10 LAB
ALBUMIN SERPL BCP-MCNC: 3.4 G/DL (ref 3.5–5.2)
ALP SERPL-CCNC: 116 U/L (ref 55–135)
ALT SERPL W/O P-5'-P-CCNC: 48 U/L (ref 10–44)
ANION GAP SERPL CALC-SCNC: 5 MMOL/L (ref 8–16)
AST SERPL-CCNC: 87 U/L (ref 10–40)
BASOPHILS # BLD AUTO: 0.01 K/UL (ref 0–0.2)
BASOPHILS NFR BLD: 0.1 % (ref 0–1.9)
BILIRUB SERPL-MCNC: 0.7 MG/DL (ref 0.1–1)
BUN SERPL-MCNC: 23 MG/DL (ref 8–23)
CALCIUM SERPL-MCNC: 9.6 MG/DL (ref 8.7–10.5)
CHLORIDE SERPL-SCNC: 101 MMOL/L (ref 95–110)
CO2 SERPL-SCNC: 32 MMOL/L (ref 23–29)
CREAT SERPL-MCNC: 1.8 MG/DL (ref 0.5–1.4)
DIFFERENTIAL METHOD: ABNORMAL
EOSINOPHIL # BLD AUTO: 0 K/UL (ref 0–0.5)
EOSINOPHIL NFR BLD: 0 % (ref 0–8)
ERYTHROCYTE [DISTWIDTH] IN BLOOD BY AUTOMATED COUNT: 13.2 % (ref 11.5–14.5)
EST. GFR  (NO RACE VARIABLE): 38.5 ML/MIN/1.73 M^2
GLUCOSE SERPL-MCNC: 136 MG/DL (ref 70–110)
HCT VFR BLD AUTO: 36.1 % (ref 40–54)
HGB BLD-MCNC: 12.3 G/DL (ref 14–18)
IMM GRANULOCYTES # BLD AUTO: 0.04 K/UL (ref 0–0.04)
IMM GRANULOCYTES NFR BLD AUTO: 0.4 % (ref 0–0.5)
LIPASE SERPL-CCNC: 54 U/L (ref 23–300)
LYMPHOCYTES # BLD AUTO: 1.3 K/UL (ref 1–4.8)
LYMPHOCYTES NFR BLD: 12.8 % (ref 18–48)
MCH RBC QN AUTO: 28.3 PG (ref 27–31)
MCHC RBC AUTO-ENTMCNC: 34.1 G/DL (ref 32–36)
MCV RBC AUTO: 83 FL (ref 82–98)
MONOCYTES # BLD AUTO: 1.1 K/UL (ref 0.3–1)
MONOCYTES NFR BLD: 10.7 % (ref 4–15)
NEUTROPHILS # BLD AUTO: 7.5 K/UL (ref 1.8–7.7)
NEUTROPHILS NFR BLD: 76 % (ref 38–73)
NRBC BLD-RTO: 0 /100 WBC
PLATELET # BLD AUTO: 165 K/UL (ref 150–450)
PMV BLD AUTO: 10 FL (ref 9.2–12.9)
POTASSIUM SERPL-SCNC: 3.5 MMOL/L (ref 3.5–5.1)
PROT SERPL-MCNC: 8 G/DL (ref 6–8.4)
RBC # BLD AUTO: 4.34 M/UL (ref 4.6–6.2)
SODIUM SERPL-SCNC: 138 MMOL/L (ref 136–145)
WBC # BLD AUTO: 9.91 K/UL (ref 3.9–12.7)

## 2023-02-10 PROCEDURE — 63600175 PHARM REV CODE 636 W HCPCS: Performed by: NURSE PRACTITIONER

## 2023-02-10 PROCEDURE — 96375 TX/PRO/DX INJ NEW DRUG ADDON: CPT

## 2023-02-10 PROCEDURE — 36415 COLL VENOUS BLD VENIPUNCTURE: CPT | Performed by: NURSE PRACTITIONER

## 2023-02-10 PROCEDURE — 85025 COMPLETE CBC W/AUTO DIFF WBC: CPT | Performed by: NURSE PRACTITIONER

## 2023-02-10 PROCEDURE — 83690 ASSAY OF LIPASE: CPT | Performed by: NURSE PRACTITIONER

## 2023-02-10 PROCEDURE — 99284 EMERGENCY DEPT VISIT MOD MDM: CPT | Mod: 25

## 2023-02-10 PROCEDURE — 80053 COMPREHEN METABOLIC PANEL: CPT | Performed by: NURSE PRACTITIONER

## 2023-02-10 PROCEDURE — 25000003 PHARM REV CODE 250: Performed by: NURSE PRACTITIONER

## 2023-02-10 PROCEDURE — 96374 THER/PROPH/DIAG INJ IV PUSH: CPT

## 2023-02-10 RX ORDER — ONDANSETRON 2 MG/ML
8 INJECTION INTRAMUSCULAR; INTRAVENOUS
Status: COMPLETED | OUTPATIENT
Start: 2023-02-10 | End: 2023-02-10

## 2023-02-10 RX ORDER — ONDANSETRON 4 MG/1
4 TABLET, ORALLY DISINTEGRATING ORAL EVERY 8 HOURS PRN
Qty: 6 TABLET | Refills: 0 | Status: SHIPPED | OUTPATIENT
Start: 2023-02-10 | End: 2023-02-12

## 2023-02-10 RX ORDER — HYDROMORPHONE HYDROCHLORIDE 1 MG/ML
1 INJECTION, SOLUTION INTRAMUSCULAR; INTRAVENOUS; SUBCUTANEOUS
Status: COMPLETED | OUTPATIENT
Start: 2023-02-10 | End: 2023-02-10

## 2023-02-10 RX ADMIN — SODIUM CHLORIDE 500 ML: 9 INJECTION, SOLUTION INTRAVENOUS at 05:02

## 2023-02-10 RX ADMIN — ONDANSETRON HYDROCHLORIDE 8 MG: 2 SOLUTION INTRAMUSCULAR; INTRAVENOUS at 05:02

## 2023-02-10 RX ADMIN — HYDROMORPHONE HYDROCHLORIDE 1 MG: 1 INJECTION, SOLUTION INTRAMUSCULAR; INTRAVENOUS; SUBCUTANEOUS at 05:02

## 2023-02-10 NOTE — ED PROVIDER NOTES
Encounter Date: 2/10/2023       History     Chief Complaint   Patient presents with    Vomiting     Pt stated that he had a spinal surgery earlier in the week. Presents to ED with complaints of vomiting that has began earlier today.      This is a 77 year old black male with a hx of CAD, diverticulitis, and HTN who presents to the ED with c/o vomiting that began today. Pt underwent PLIF 4 days ago at another facility and was dc'd yesterday. This morning he developed nausea and frequent, small episodes of vomiting. He has been trying to eat soup and continue his 2 antibiotics and percocet but continues to vomit after most intake. He is also experiencing worsening pain as he has been unable to take his pain medication over the last 12 hours due to nausea. He also reports mid abdominal cramping/burning and constipation since surgey; last BM 4 days ago. He denies fever, chills, chest pain, sob, weakness, or dysuria/trouble urinating.     Review of patient's allergies indicates:   Allergen Reactions    Aspirin Other (See Comments)     GI intolerant     Bidil [isosorbide-hydralazine] Other (See Comments)     Tounge burning       Penicillins Rash    Praluent pen [alirocumab] Rash    Statins-hmg-coa reductase inhibitors     Zetia [ezetimibe] Other (See Comments)     Past Medical History:   Diagnosis Date    Abnormal myocardial perfusion study     Anticoagulant long-term use     Arthritis     BPH (benign prostatic hyperplasia)     Coronary artery disease     PTCA STENT RCA/CFX 2014 and CFX 2021    Digestive disorder     Diverticulitis 03/02/2022    Encounter for blood transfusion     GERD (gastroesophageal reflux disease)     Gout     H/O heart artery stent     High cholesterol     Hypercholesteremia     Hypertension     Kidney cysts     UTI (urinary tract infection)      Past Surgical History:   Procedure Laterality Date    ANTERIOR CERVICAL DISCECTOMY W/ FUSION  2007    BACK SURGERY      cardiac stents      CATARACT  EXTRACTION, BILATERAL      COLONOSCOPY      COLONOSCOPY N/A 5/29/2017    Procedure: COLONOSCOPY;  Surgeon: Luis Bogran-Reyes, MD;  Location: [facility];  Service: Endoscopy;  Laterality: N/A;    CORONARY ANGIOGRAPHY N/A 2/2/2021    Procedure: ANGIOGRAM, CORONARY ARTERY;  Surgeon: James Nash MD;  Location: [facility] CATH;  Service: Cardiology;  Laterality: N/A;    DIRECT LATERAL INTERBODY FUSION (DLIF) OF SPINE N/A 2/6/2023    Procedure: FUSION, SPINE, DLIF, PRONE LATERAL INTERBODY FUSION W/ BMP, L3-4;  Surgeon: Hunter Langston MD;  Location: [facility] OR;  Service: Orthopedics;  Laterality: N/A;  Alphatech for Prone, Nuvasive for PSF    ESOPHAGOGASTRODUODENOSCOPY      EYE SURGERY      Bilateral cataract     FACETECTOMY OF VERTEBRA Left 8/26/2022    Procedure: FACETECTOMY, PARTIAL MEDIAL;  Surgeon: Hunter Langston MD;  Location: [facility] OR;  Service: Orthopedics;  Laterality: Left;    FORAMINOTOMY Left 8/26/2022    Procedure: FORAMINOTOMY, SPINE;  Surgeon: Hunter Langston MD;  Location: [facility] OR;  Service: Orthopedics;  Laterality: Left;    FUSION OF LUMBAR SPINE USING POSTERIOR INTERBODY TECHNIQUE N/A 2/6/2023    Procedure: FUSION, SPINE, LUMBAR, PLIF, REVISION, L2-L5 W/ REPAIR NONUNION L3-4;  Surgeon: Hunter Langston MD;  Location: [facility] OR;  Service: Orthopedics;  Laterality: N/A;    LAMINOTOMY Left 8/26/2022    Procedure: LAMINOTOMY, POSTERIOR, CERVICAL, LEFT C6-C7;  Surgeon: Hunter Langston MD;  Location: [facility] OR;  Service: Orthopedics;  Laterality: Left;    LUMBAR LAMINECTOMY  12/14/2012    L2-4, Dr. Stover    PROSTATE SURGERY       Family History   Problem Relation Age of Onset    Stroke Mother     Hypertension Mother     Heart disease Mother     Cataracts Father     Heart disease Father     Thyroid disease Sister     Blindness Brother     Heart disease Brother     Stroke Sister      Social History     Tobacco Use    Smoking status: Former     Types: Cigarettes     Quit date: 11/23/1980     Years since  quittin.2    Smokeless tobacco: Never   Substance Use Topics    Alcohol use: Yes     Comment: SELDOM    Drug use: No     Review of Systems   Constitutional:  Positive for appetite change. Negative for fever.   HENT: Negative.     Respiratory: Negative.     Cardiovascular: Negative.    Gastrointestinal:  Positive for abdominal pain, constipation, nausea and vomiting. Negative for diarrhea.   Genitourinary:  Negative for difficulty urinating and dysuria.   Musculoskeletal:  Positive for back pain.   Skin: Negative.    Neurological:  Negative for dizziness and weakness.     Physical Exam     Initial Vitals [02/10/23 1655]   BP Pulse Resp Temp SpO2   (!) 183/84 76 18 98.5 °F (36.9 °C) 96 %      MAP       --         Physical Exam    Nursing note and vitals reviewed.  Constitutional: He appears well-developed and well-nourished. He is active. No distress.   HENT:   Head: Normocephalic and atraumatic.   Mouth/Throat: Oropharynx is clear and moist. No oropharyngeal exudate.   Eyes: EOM are normal. Pupils are equal, round, and reactive to light.   Neck: Neck supple.   Normal range of motion.  Cardiovascular:  Normal rate, regular rhythm and normal heart sounds.           Pulmonary/Chest: Breath sounds normal. No respiratory distress.   Abdominal: Abdomen is soft. Bowel sounds are normal. He exhibits no distension. There is no abdominal tenderness.   Musculoskeletal:         General: Normal range of motion.      Cervical back: Normal range of motion and neck supple.     Neurological: He is alert and oriented to person, place, and time. GCS score is 15. GCS eye subscore is 4. GCS verbal subscore is 5. GCS motor subscore is 6.   Skin: Skin is warm and dry. Capillary refill takes less than 2 seconds.   Psychiatric: He has a normal mood and affect. His behavior is normal. Thought content normal.       ED Course   Procedures  Labs Reviewed   CBC W/ AUTO DIFFERENTIAL - Abnormal; Notable for the following components:        Result Value    RBC 4.34 (*)     Hemoglobin 12.3 (*)     Hematocrit 36.1 (*)     Mono # 1.1 (*)     Gran % 76.0 (*)     Lymph % 12.8 (*)     All other components within normal limits   COMPREHENSIVE METABOLIC PANEL - Abnormal; Notable for the following components:    CO2 32 (*)     Glucose 136 (*)     Creatinine 1.8 (*)     Albumin 3.4 (*)     AST 87 (*)     ALT 48 (*)     Anion Gap 5 (*)     eGFR 38.5 (*)     All other components within normal limits   LIPASE          Imaging Results    None          Medications   HYDROmorphone injection 1 mg (1 mg Intravenous Given 2/10/23 1720)   ondansetron injection 8 mg (8 mg Intravenous Given 2/10/23 1718)   sodium chloride 0.9% bolus 500 mL 500 mL (0 mLs Intravenous Stopped 2/10/23 1742)                 ED Course as of 02/10/23 1819   Fri Feb 10, 2023   1817 Labs relatively unremarkable.  Patient reports resolution of symptoms after receiving medications.  Encouraged follow-up with PCP if symptoms persist and return to the emergency department for worsening condition. [CB]      ED Course User Index  [CB] Rosalind Shook NP                 Clinical Impression:   Final diagnoses:  [R11.2] Nausea and vomiting, unspecified vomiting type (Primary)        ED Disposition Condition    Discharge Stable          ED Prescriptions       Medication Sig Dispense Start Date End Date Auth. Provider    ondansetron (ZOFRAN-ODT) 4 MG TbDL Take 1 tablet (4 mg total) by mouth every 8 (eight) hours as needed. 6 tablet 2/10/2023 2/12/2023 Rosalind Shook NP    L.acidophil,parac-S.therm-Bif. (RISAQUAD) Cap capsule Take 1 capsule by mouth once daily. for 10 days 10 capsule 2/10/2023 2/20/2023 Rosalind Shook NP          Follow-up Information       Follow up With Specialties Details Why Contact Info    Tho Duran Jr., MD Internal Medicine Schedule an appointment as soon as possible for a visit in 2 days for re-evaluation of today's complaint [address] [place] [address]  [id number]  [phone]               Rosalind Shook, NP  02/10/23 7257

## 2023-02-20 ENCOUNTER — HOSPITAL ENCOUNTER (EMERGENCY)
Facility: HOSPITAL | Age: 78
Discharge: HOME | End: 2023-02-20
Attending: EMERGENCY MEDICINE
Payer: MEDICARE

## 2023-02-20 VITALS
WEIGHT: 195 LBS | DIASTOLIC BLOOD PRESSURE: 84 MMHG | RESPIRATION RATE: 16 BRPM | HEIGHT: 73 IN | BODY MASS INDEX: 25.84 KG/M2 | SYSTOLIC BLOOD PRESSURE: 153 MMHG | TEMPERATURE: 98 F | HEART RATE: 69 BPM | OXYGEN SATURATION: 97 %

## 2023-02-20 DIAGNOSIS — K59.00 CONSTIPATION, UNSPECIFIED CONSTIPATION TYPE: Primary | ICD-10-CM

## 2023-02-20 DIAGNOSIS — K59.00 CONSTIPATION: ICD-10-CM

## 2023-02-20 PROCEDURE — 99283 EMERGENCY DEPT VISIT LOW MDM: CPT

## 2023-02-20 RX ORDER — SYRING-NEEDL,DISP,INSUL,0.3 ML 29 G X1/2"
296 SYRINGE, EMPTY DISPOSABLE MISCELLANEOUS ONCE
Qty: 296 ML | Refills: 1 | Status: SHIPPED | OUTPATIENT
Start: 2023-02-20 | End: 2023-02-20

## 2023-02-20 RX ORDER — DOCUSATE SODIUM 100 MG/1
100 CAPSULE, LIQUID FILLED ORAL 2 TIMES DAILY PRN
Qty: 60 CAPSULE | Refills: 0 | Status: SHIPPED | OUTPATIENT
Start: 2023-02-20

## 2023-02-20 NOTE — DISCHARGE INSTRUCTIONS
Drink the magnesium citrate solution and if you do not produce a bowel movement you can repeated again in 12 hours.  You can also take a stool softener daily.  You can try the enema and home.  Hold it in for as long as you can to produce the best effects.  Follow up with your primary care provider if her symptoms do not improve.

## 2023-02-20 NOTE — ED PROVIDER NOTES
"Encounter Date: 2/20/2023       History     Chief Complaint   Patient presents with    Constipation     Pt stated that he hasn't had bowel movement for the past 2 days - accompanied with lower abdominal pain with rectal "pressure". Stated that he has been experiencing constipation off and on since PLIF on 02/06/2023.     77-year-old male to ED with complaints of constipation for 2-3 days.  He recently had a spinal fusion completed and was placed on opiate pain medication.  He reports attempting to increase his p.o. fluid intake without any improvement in his constipation symptoms.  He was not currently taking stool softeners, however he continues to take tramadol daily.    The history is provided by the patient.   Review of patient's allergies indicates:   Allergen Reactions    Aspirin Other (See Comments)     GI intolerant     Bidil [isosorbide-hydralazine] Other (See Comments)     Tounge burning       Penicillins Rash    Praluent pen [alirocumab] Rash    Statins-hmg-coa reductase inhibitors     Zetia [ezetimibe] Other (See Comments)     Past Medical History:   Diagnosis Date    Abnormal myocardial perfusion study     Anticoagulant long-term use     Arthritis     BPH (benign prostatic hyperplasia)     Coronary artery disease     PTCA STENT RCA/CFX 2014 and CFX 2021    Digestive disorder     Diverticulitis 03/02/2022    Encounter for blood transfusion     GERD (gastroesophageal reflux disease)     Gout     H/O heart artery stent     High cholesterol     Hypercholesteremia     Hypertension     Kidney cysts     UTI (urinary tract infection)      Past Surgical History:   Procedure Laterality Date    ANTERIOR CERVICAL DISCECTOMY W/ FUSION  2007    BACK SURGERY      cardiac stents      CATARACT EXTRACTION, BILATERAL      COLONOSCOPY      COLONOSCOPY N/A 5/29/2017    Procedure: COLONOSCOPY;  Surgeon: Luis Bogran-Reyes, MD;  Location: [facility];  Service: Endoscopy;  Laterality: N/A;    CORONARY ANGIOGRAPHY N/A 2/2/2021    " Procedure: ANGIOGRAM, CORONARY ARTERY;  Surgeon: James Nash MD;  Location: [facility] CATH;  Service: Cardiology;  Laterality: N/A;    DIRECT LATERAL INTERBODY FUSION (DLIF) OF SPINE N/A 2023    Procedure: FUSION, SPINE, DLIF, PRONE LATERAL INTERBODY FUSION W/ BMP, L3-4;  Surgeon: Hunter Langston MD;  Location: [facility] OR;  Service: Orthopedics;  Laterality: N/A;  Alphatech for Prone, Nuvasive for PSF    ESOPHAGOGASTRODUODENOSCOPY      EYE SURGERY      Bilateral cataract     FACETECTOMY OF VERTEBRA Left 2022    Procedure: FACETECTOMY, PARTIAL MEDIAL;  Surgeon: Hunter Langston MD;  Location: [facility] OR;  Service: Orthopedics;  Laterality: Left;    FORAMINOTOMY Left 2022    Procedure: FORAMINOTOMY, SPINE;  Surgeon: Hunter Langston MD;  Location: [facility] OR;  Service: Orthopedics;  Laterality: Left;    FUSION OF LUMBAR SPINE USING POSTERIOR INTERBODY TECHNIQUE N/A 2023    Procedure: FUSION, SPINE, LUMBAR, PLIF, REVISION, L2-L5 W/ REPAIR NONUNION L3-4;  Surgeon: Hunter Langston MD;  Location: [facility] OR;  Service: Orthopedics;  Laterality: N/A;    LAMINOTOMY Left 2022    Procedure: LAMINOTOMY, POSTERIOR, CERVICAL, LEFT C6-C7;  Surgeon: Hunter Langston MD;  Location: [facility] OR;  Service: Orthopedics;  Laterality: Left;    LUMBAR LAMINECTOMY  2012    L2-4, Dr. Stover    PROSTATE SURGERY       Family History   Problem Relation Age of Onset    Stroke Mother     Hypertension Mother     Heart disease Mother     Cataracts Father     Heart disease Father     Thyroid disease Sister     Blindness Brother     Heart disease Brother     Stroke Sister      Social History     Tobacco Use    Smoking status: Former     Types: Cigarettes     Quit date: 1980     Years since quittin.2    Smokeless tobacco: Never   Substance Use Topics    Alcohol use: Yes     Comment: SELDOM    Drug use: No     Review of Systems   Constitutional:  Negative for fever.   HENT:  Negative for sore throat.    Eyes: Negative.     Respiratory:  Negative for shortness of breath.    Cardiovascular:  Negative for chest pain.   Gastrointestinal:  Positive for abdominal pain and constipation. Negative for nausea and vomiting.   Endocrine: Negative.    Genitourinary:  Positive for frequency. Negative for dysuria.   Musculoskeletal:  Negative for back pain.   Skin:  Negative for rash and wound.   Allergic/Immunologic: Negative.    Neurological:  Negative for weakness.   Hematological:  Does not bruise/bleed easily.   Psychiatric/Behavioral: Negative.       Physical Exam     Initial Vitals [02/20/23 1024]   BP Pulse Resp Temp SpO2   (!) 153/84 69 16 97.8 °F (36.6 °C) 97 %      MAP       --         Physical Exam    Nursing note and vitals reviewed.  Constitutional: He appears well-developed and well-nourished.   HENT:   Head: Normocephalic and atraumatic.   Eyes: EOM are normal.   Neck: Neck supple.   Normal range of motion.  Cardiovascular:  Normal rate and regular rhythm.           Pulmonary/Chest: No respiratory distress.   Abdominal: Abdomen is soft. Bowel sounds are normal. He exhibits no distension and no mass. There is no abdominal tenderness. There is no guarding.   Musculoskeletal:         General: Normal range of motion.      Cervical back: Normal range of motion and neck supple.     Neurological: He is alert and oriented to person, place, and time.   Skin: Skin is warm and dry.   Psychiatric: He has a normal mood and affect. Thought content normal.       ED Course   Procedures  Labs Reviewed - No data to display       Imaging Results              X-Ray Abdomen Flat And Erect (In process)                      Medications - No data to display  Medical Decision Making:   Clinical Tests:   Radiological Study: Ordered and Reviewed  ED Management:  77-year-old male to ED for above complaints.  He has not have a significant bowel movement in 2-3 days.  X-rays of abdomen were obtained and showed moderate stool burden.  Bowel sounds were normal  active.  He was given a prescription for magnesium citrate and stool softeners.  He was also instructed to attempt OTC fleets enema at home.  Return precautions were given.  He was stable for discharge.                        Clinical Impression:   Final diagnoses:  [K59.00] Constipation  [K59.00] Constipation, unspecified constipation type (Primary)        ED Disposition Condition    Discharge Stable          ED Prescriptions       Medication Sig Dispense Start Date End Date Auth. Provider    magnesium citrate solution (Expires today) Take 296 mLs by mouth once. for 1 dose 296 mL 2/20/2023 2/20/2023 Dayo Romo NP    docusate sodium (COLACE) 100 MG capsule Take 1 capsule (100 mg total) by mouth 2 (two) times daily as needed for Constipation. 60 capsule 2/20/2023 -- Dayo Romo NP          Follow-up Information       Follow up With Specialties Details Why Contact Info    Tho Duran Jr., MD Internal Medicine In 2 days  [address] [place] [id number]  [phone]               Dayo Romo NP  02/20/23 1991

## 2023-05-23 PROBLEM — R21 RASH: Status: ACTIVE | Noted: 2023-05-23

## 2023-06-08 ENCOUNTER — LAB VISIT (OUTPATIENT)
Dept: LAB | Facility: HOSPITAL | Age: 78
End: 2023-06-08
Attending: INTERNAL MEDICINE
Payer: MEDICARE

## 2023-06-08 DIAGNOSIS — N18.31 CHRONIC KIDNEY DISEASE (CKD) STAGE G3A/A1, MODERATELY DECREASED GLOMERULAR FILTRATION RATE (GFR) BETWEEN 45-59 ML/MIN/1.73 SQUARE METER AND ALBUMINURIA CREATININE RATIO LESS THAN 30 MG/G: ICD-10-CM

## 2023-06-08 DIAGNOSIS — Q63.1 CONGENITAL FUSION OF KIDNEYS: Primary | ICD-10-CM

## 2023-06-08 DIAGNOSIS — I12.9 PARENCHYMAL RENAL HYPERTENSION: ICD-10-CM

## 2023-06-08 LAB
ALBUMIN SERPL BCP-MCNC: 3.7 G/DL (ref 3.5–5.2)
ALP SERPL-CCNC: 129 U/L (ref 55–135)
ALT SERPL W/O P-5'-P-CCNC: 23 U/L (ref 10–44)
ANION GAP SERPL CALC-SCNC: 3 MMOL/L (ref 8–16)
AST SERPL-CCNC: 16 U/L (ref 10–40)
BASOPHILS # BLD AUTO: 0.01 K/UL (ref 0–0.2)
BASOPHILS NFR BLD: 0.1 % (ref 0–1.9)
BILIRUB SERPL-MCNC: 0.6 MG/DL (ref 0.1–1)
BUN SERPL-MCNC: 27 MG/DL (ref 8–23)
CALCIUM SERPL-MCNC: 9.4 MG/DL (ref 8.7–10.5)
CHLORIDE SERPL-SCNC: 110 MMOL/L (ref 95–110)
CO2 SERPL-SCNC: 26 MMOL/L (ref 23–29)
CREAT SERPL-MCNC: 1.9 MG/DL (ref 0.5–1.4)
DIFFERENTIAL METHOD: ABNORMAL
EOSINOPHIL # BLD AUTO: 0 K/UL (ref 0–0.5)
EOSINOPHIL NFR BLD: 0 % (ref 0–8)
ERYTHROCYTE [DISTWIDTH] IN BLOOD BY AUTOMATED COUNT: 14.4 % (ref 11.5–14.5)
EST. GFR  (NO RACE VARIABLE): 36.1 ML/MIN/1.73 M^2
GLUCOSE SERPL-MCNC: 98 MG/DL (ref 70–110)
HCT VFR BLD AUTO: 40.3 % (ref 40–54)
HGB BLD-MCNC: 13.3 G/DL (ref 14–18)
IMM GRANULOCYTES # BLD AUTO: 0.06 K/UL (ref 0–0.04)
IMM GRANULOCYTES NFR BLD AUTO: 0.6 % (ref 0–0.5)
LYMPHOCYTES # BLD AUTO: 2.2 K/UL (ref 1–4.8)
LYMPHOCYTES NFR BLD: 23.9 % (ref 18–48)
MCH RBC QN AUTO: 28.4 PG (ref 27–31)
MCHC RBC AUTO-ENTMCNC: 33 G/DL (ref 32–36)
MCV RBC AUTO: 86 FL (ref 82–98)
MONOCYTES # BLD AUTO: 0.7 K/UL (ref 0.3–1)
MONOCYTES NFR BLD: 7.3 % (ref 4–15)
NEUTROPHILS # BLD AUTO: 6.4 K/UL (ref 1.8–7.7)
NEUTROPHILS NFR BLD: 68.1 % (ref 38–73)
NRBC BLD-RTO: 0 /100 WBC
PLATELET # BLD AUTO: 203 K/UL (ref 150–450)
PMV BLD AUTO: 10 FL (ref 9.2–12.9)
POTASSIUM SERPL-SCNC: 4.1 MMOL/L (ref 3.5–5.1)
PROT SERPL-MCNC: 7.8 G/DL (ref 6–8.4)
RBC # BLD AUTO: 4.69 M/UL (ref 4.6–6.2)
SODIUM SERPL-SCNC: 139 MMOL/L (ref 136–145)
WBC # BLD AUTO: 9.35 K/UL (ref 3.9–12.7)

## 2023-06-08 PROCEDURE — 85025 COMPLETE CBC W/AUTO DIFF WBC: CPT | Performed by: INTERNAL MEDICINE

## 2023-06-08 PROCEDURE — 36415 COLL VENOUS BLD VENIPUNCTURE: CPT | Performed by: INTERNAL MEDICINE

## 2023-06-08 PROCEDURE — 80053 COMPREHEN METABOLIC PANEL: CPT | Performed by: INTERNAL MEDICINE

## 2023-07-02 ENCOUNTER — HOSPITAL ENCOUNTER (EMERGENCY)
Facility: HOSPITAL | Age: 78
Discharge: HOME | End: 2023-07-02
Attending: EMERGENCY MEDICINE
Payer: MEDICARE

## 2023-07-02 VITALS
HEIGHT: 73 IN | OXYGEN SATURATION: 98 % | BODY MASS INDEX: 24.52 KG/M2 | SYSTOLIC BLOOD PRESSURE: 162 MMHG | WEIGHT: 185 LBS | DIASTOLIC BLOOD PRESSURE: 88 MMHG | TEMPERATURE: 98 F | RESPIRATION RATE: 20 BRPM | HEART RATE: 82 BPM

## 2023-07-02 DIAGNOSIS — R10.32 LEFT LOWER QUADRANT ABDOMINAL PAIN: Primary | ICD-10-CM

## 2023-07-02 LAB
ALBUMIN SERPL BCP-MCNC: 3.9 G/DL (ref 3.5–5.2)
ALP SERPL-CCNC: 137 U/L (ref 55–135)
ALT SERPL W/O P-5'-P-CCNC: 26 U/L (ref 10–44)
ANION GAP SERPL CALC-SCNC: 4 MMOL/L (ref 8–16)
AST SERPL-CCNC: 24 U/L (ref 10–40)
BASOPHILS # BLD AUTO: 0.01 K/UL (ref 0–0.2)
BASOPHILS NFR BLD: 0.2 % (ref 0–1.9)
BILIRUB SERPL-MCNC: 0.5 MG/DL (ref 0.1–1)
BILIRUB UR QL STRIP: NEGATIVE
BUN SERPL-MCNC: 18 MG/DL (ref 8–23)
CALCIUM SERPL-MCNC: 9.4 MG/DL (ref 8.7–10.5)
CHLORIDE SERPL-SCNC: 107 MMOL/L (ref 95–110)
CLARITY UR: CLEAR
CO2 SERPL-SCNC: 27 MMOL/L (ref 23–29)
COLOR UR: YELLOW
CREAT SERPL-MCNC: 1.5 MG/DL (ref 0.5–1.4)
DIFFERENTIAL METHOD: NORMAL
EOSINOPHIL # BLD AUTO: 0 K/UL (ref 0–0.5)
EOSINOPHIL NFR BLD: 0 % (ref 0–8)
ERYTHROCYTE [DISTWIDTH] IN BLOOD BY AUTOMATED COUNT: 13.7 % (ref 11.5–14.5)
EST. GFR  (NO RACE VARIABLE): 48 ML/MIN/1.73 M^2
GLUCOSE SERPL-MCNC: 123 MG/DL (ref 70–110)
GLUCOSE UR QL STRIP: NEGATIVE
HCT VFR BLD AUTO: 41.6 % (ref 40–54)
HGB BLD-MCNC: 14.1 G/DL (ref 14–18)
HGB UR QL STRIP: NEGATIVE
IMM GRANULOCYTES # BLD AUTO: 0.02 K/UL (ref 0–0.04)
IMM GRANULOCYTES NFR BLD AUTO: 0.3 % (ref 0–0.5)
KETONES UR QL STRIP: NEGATIVE
LEUKOCYTE ESTERASE UR QL STRIP: NEGATIVE
LIPASE SERPL-CCNC: 68 U/L (ref 23–300)
LYMPHOCYTES # BLD AUTO: 1.4 K/UL (ref 1–4.8)
LYMPHOCYTES NFR BLD: 21.6 % (ref 18–48)
MCH RBC QN AUTO: 28.6 PG (ref 27–31)
MCHC RBC AUTO-ENTMCNC: 33.9 G/DL (ref 32–36)
MCV RBC AUTO: 84 FL (ref 82–98)
MONOCYTES # BLD AUTO: 0.4 K/UL (ref 0.3–1)
MONOCYTES NFR BLD: 7 % (ref 4–15)
NEUTROPHILS # BLD AUTO: 4.4 K/UL (ref 1.8–7.7)
NEUTROPHILS NFR BLD: 70.9 % (ref 38–73)
NITRITE UR QL STRIP: NEGATIVE
NRBC BLD-RTO: 0 /100 WBC
PH UR STRIP: 7 [PH] (ref 5–8)
PLATELET # BLD AUTO: 184 K/UL (ref 150–450)
PMV BLD AUTO: 10 FL (ref 9.2–12.9)
POTASSIUM SERPL-SCNC: 3.8 MMOL/L (ref 3.5–5.1)
PROT SERPL-MCNC: 8.1 G/DL (ref 6–8.4)
PROT UR QL STRIP: NEGATIVE
RBC # BLD AUTO: 4.93 M/UL (ref 4.6–6.2)
SODIUM SERPL-SCNC: 138 MMOL/L (ref 136–145)
SP GR UR STRIP: 1.01 (ref 1–1.03)
URN SPEC COLLECT METH UR: NORMAL
UROBILINOGEN UR STRIP-ACNC: NEGATIVE EU/DL
WBC # BLD AUTO: 6.26 K/UL (ref 3.9–12.7)

## 2023-07-02 PROCEDURE — 85025 COMPLETE CBC W/AUTO DIFF WBC: CPT | Performed by: NURSE PRACTITIONER

## 2023-07-02 PROCEDURE — 83690 ASSAY OF LIPASE: CPT | Performed by: NURSE PRACTITIONER

## 2023-07-02 PROCEDURE — 96374 THER/PROPH/DIAG INJ IV PUSH: CPT

## 2023-07-02 PROCEDURE — 36415 COLL VENOUS BLD VENIPUNCTURE: CPT | Performed by: NURSE PRACTITIONER

## 2023-07-02 PROCEDURE — 81003 URINALYSIS AUTO W/O SCOPE: CPT | Performed by: NURSE PRACTITIONER

## 2023-07-02 PROCEDURE — 63600175 PHARM REV CODE 636 W HCPCS: Performed by: NURSE PRACTITIONER

## 2023-07-02 PROCEDURE — 80053 COMPREHEN METABOLIC PANEL: CPT | Performed by: NURSE PRACTITIONER

## 2023-07-02 PROCEDURE — 99285 EMERGENCY DEPT VISIT HI MDM: CPT | Mod: 25

## 2023-07-02 RX ORDER — ONDANSETRON 2 MG/ML
8 INJECTION INTRAMUSCULAR; INTRAVENOUS
Status: COMPLETED | OUTPATIENT
Start: 2023-07-02 | End: 2023-07-02

## 2023-07-02 RX ORDER — DICYCLOMINE HYDROCHLORIDE 20 MG/1
20 TABLET ORAL 2 TIMES DAILY
Qty: 10 TABLET | Refills: 0 | Status: SHIPPED | OUTPATIENT
Start: 2023-07-02 | End: 2023-07-07

## 2023-07-02 RX ADMIN — ONDANSETRON 8 MG: 2 INJECTION INTRAMUSCULAR; INTRAVENOUS at 09:07

## 2023-07-02 NOTE — ED PROVIDER NOTES
Encounter Date: 7/2/2023       History     Chief Complaint   Patient presents with    Abdominal Pain     Pt stated that he has been experiencing abdominal pain with nausea since last night.      7-YEAR-OLD MALE WITH COMPLAINTS OF ABDOMINAL SINCE THIS MORNING.  PATIENT STATES CRAMPY STARTED SOON AFTER TAKING TRAMADOL.  Patient has chronic back pain and takes tramadol for pain relief.  Patient states he had a BM last night and this morning.  Associated nausea.  Denies fever.  Patient's history is significant for diverticulitis, CAD, chronic neck and back pain, and arthritis    Review of patient's allergies indicates:   Allergen Reactions    Aspirin Other (See Comments)     GI intolerant     Bidil [isosorbide-hydralazine] Other (See Comments)     Tounge burning       Penicillins Rash    Praluent pen [alirocumab] Rash    Statins-hmg-coa reductase inhibitors     Zetia [ezetimibe] Other (See Comments)     Past Medical History:   Diagnosis Date    Abnormal myocardial perfusion study     Anticoagulant long-term use     Arthritis     BPH (benign prostatic hyperplasia)     Coronary artery disease     PTCA STENT RCA/CFX 2014 and CFX 2021    Digestive disorder     Diverticulitis 03/02/2022    Encounter for blood transfusion     GERD (gastroesophageal reflux disease)     Gout     H/O heart artery stent     High cholesterol     Hypercholesteremia     Hypertension     Kidney cysts     UTI (urinary tract infection)      Past Surgical History:   Procedure Laterality Date    ANTERIOR CERVICAL DISCECTOMY W/ FUSION  2007    BACK SURGERY      cardiac stents      CATARACT EXTRACTION, BILATERAL      COLONOSCOPY      COLONOSCOPY N/A 5/29/2017    Procedure: COLONOSCOPY;  Surgeon: Luis Bogran-Reyes, MD;  Location: [facility];  Service: Endoscopy;  Laterality: N/A;    CORONARY ANGIOGRAPHY N/A 2/2/2021    Procedure: ANGIOGRAM, CORONARY ARTERY;  Surgeon: James Nash MD;  Location: [facility] CATH;  Service: Cardiology;  Laterality: N/A;     DIRECT LATERAL INTERBODY FUSION (DLIF) OF SPINE N/A 2023    Procedure: FUSION, SPINE, DLIF, PRONE LATERAL INTERBODY FUSION W/ BMP, L3-4;  Surgeon: Hunter Langston MD;  Location: [facility] OR;  Service: Orthopedics;  Laterality: N/A;  Alphatech for Prone, Nuvasive for PSF    ESOPHAGOGASTRODUODENOSCOPY      EYE SURGERY      Bilateral cataract     FACETECTOMY OF VERTEBRA Left 2022    Procedure: FACETECTOMY, PARTIAL MEDIAL;  Surgeon: Hunter Langston MD;  Location: [facility] OR;  Service: Orthopedics;  Laterality: Left;    FORAMINOTOMY Left 2022    Procedure: FORAMINOTOMY, SPINE;  Surgeon: Hunter Langston MD;  Location: [facility] OR;  Service: Orthopedics;  Laterality: Left;    FUSION OF LUMBAR SPINE USING POSTERIOR INTERBODY TECHNIQUE N/A 2023    Procedure: FUSION, SPINE, LUMBAR, PLIF, REVISION, L2-L5 W/ REPAIR NONUNION L3-4;  Surgeon: Hunter Langston MD;  Location: [facility] OR;  Service: Orthopedics;  Laterality: N/A;    LAMINOTOMY Left 2022    Procedure: LAMINOTOMY, POSTERIOR, CERVICAL, LEFT C6-C7;  Surgeon: Hunter Langston MD;  Location: [facility] OR;  Service: Orthopedics;  Laterality: Left;    LUMBAR LAMINECTOMY  2012    L2-4, Dr. Stover    PROSTATE SURGERY       Family History   Problem Relation Age of Onset    Stroke Mother     Hypertension Mother     Heart disease Mother     Cataracts Father     Heart disease Father     Thyroid disease Sister     Blindness Brother     Heart disease Brother     Stroke Sister      Social History     Tobacco Use    Smoking status: Former     Types: Cigarettes     Quit date: 1980     Years since quittin.6    Smokeless tobacco: Never   Substance Use Topics    Alcohol use: Yes     Comment: SELDOM    Drug use: No     Review of Systems   Constitutional:  Negative for fever.   HENT:  Negative for sore throat.    Respiratory:  Negative for shortness of breath.    Cardiovascular:  Negative for chest pain.   Gastrointestinal:  Positive for abdominal pain. Negative  for nausea.   Genitourinary:  Negative for dysuria.   Musculoskeletal:  Negative for back pain.   Skin:  Negative for rash.   Neurological:  Negative for weakness.   Hematological:  Does not bruise/bleed easily.     Physical Exam     Initial Vitals   BP Pulse Resp Temp SpO2   07/02/23 0903 07/02/23 0903 07/02/23 0903 07/02/23 0904 07/02/23 0903   (!) 211/102 67 18 98.3 °F (36.8 °C) 97 %      MAP       --                Physical Exam    Constitutional: Vital signs are normal. He appears well-developed and well-nourished.   HENT:   Head: Normocephalic and atraumatic.   Right Ear: Hearing and external ear normal.   Left Ear: Hearing and external ear normal.   Nose: Nose normal.   Mouth/Throat: Uvula is midline, oropharynx is clear and moist and mucous membranes are normal.   Eyes: Conjunctivae, EOM and lids are normal. Pupils are equal, round, and reactive to light.   Neck: Trachea normal. Neck supple.   Cardiovascular:  Normal rate, regular rhythm, normal heart sounds and normal pulses.           Pulmonary/Chest: Effort normal and breath sounds normal.   Abdominal: Abdomen is soft and flat. Bowel sounds are increased. There is abdominal tenderness in the left upper quadrant.   Musculoskeletal:      Cervical back: Neck supple.     Neurological: He is alert.       ED Course   Procedures  Labs Reviewed   COMPREHENSIVE METABOLIC PANEL - Abnormal; Notable for the following components:       Result Value    Glucose 123 (*)     Creatinine 1.5 (*)     Alkaline Phosphatase 137 (*)     eGFR 48.0 (*)     Anion Gap 4 (*)     All other components within normal limits   CBC W/ AUTO DIFFERENTIAL   LIPASE   URINALYSIS, REFLEX TO URINE CULTURE    Narrative:     Preferred Collection Type->Urine, Clean Catch  Specimen Source->Urine          Imaging Results              CT Abdomen Pelvis  Without Contrast (Final result)  Result time 07/02/23 10:52:09      Final result by Amber Christianson MD (07/02/23 10:52:09)                    Impression:      1. Multiple renal cysts  2. Subcentimeter cyst in the liver unchanged  3. Diverticulosis throughout the colon but no diverticulitis  4. Enlarged prostate      Electronically signed by: Amber Christianson MD  Date:    07/02/2023  Time:    10:52               Narrative:    EXAMINATION:  CT ABDOMEN PELVIS WITHOUT CONTRAST    CLINICAL HISTORY:  LLQ abdominal pain;LLQ pain   patient has history of diverticulitis;    TECHNIQUE:  Iterative reconstruction technique was used.    CT/Cardiac Nuclear exams in prior 12 months: 1    COMPARISON:  02/26/2022    FINDINGS:  Bibasilar discoid atelectasis or scar.  Postsurgical changes of the lower lumbar spine.  Fluid distended stomach with a prominent wall.  Subcentimeter cyst in the right lobe of the liver.  The spleen is normal.  The pancreas and adrenals are normal.  There are multiple renal cysts the largest on the left 4.7 cm.  There is diverticulosis throughout the colon.  There is no diverticulitis.  The prostate is enlarged measuring 5.5 cm.  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  Discussed with patient that he is having some abdominal cramps will give him some Bentyl he needs follow-up with                         Clinical Impression:   Final diagnoses:  [R10.32] Left lower quadrant abdominal pain (Primary)               Alvaro Ontiveros III, NP  07/02/23 1125

## 2023-07-06 PROBLEM — K59.00 CONSTIPATION: Status: ACTIVE | Noted: 2023-07-06

## 2023-08-28 PROBLEM — Z00.00 ROUTINE GENERAL MEDICAL EXAMINATION AT A HEALTH CARE FACILITY: Status: RESOLVED | Noted: 2021-07-02 | Resolved: 2023-08-28

## 2023-09-06 ENCOUNTER — HOSPITAL ENCOUNTER (EMERGENCY)
Facility: HOSPITAL | Age: 78
Discharge: HOME | End: 2023-09-06
Attending: EMERGENCY MEDICINE
Payer: MEDICARE

## 2023-09-06 VITALS
HEIGHT: 73 IN | WEIGHT: 180 LBS | SYSTOLIC BLOOD PRESSURE: 158 MMHG | HEART RATE: 72 BPM | BODY MASS INDEX: 23.86 KG/M2 | OXYGEN SATURATION: 99 % | DIASTOLIC BLOOD PRESSURE: 83 MMHG | TEMPERATURE: 98 F | RESPIRATION RATE: 16 BRPM

## 2023-09-06 DIAGNOSIS — M54.9 BILATERAL BACK PAIN, UNSPECIFIED BACK LOCATION, UNSPECIFIED CHRONICITY: Primary | ICD-10-CM

## 2023-09-06 DIAGNOSIS — M54.12 CERVICAL RADICULOPATHY: ICD-10-CM

## 2023-09-06 LAB
CTP QC/QA: YES
SARS-COV-2 RDRP RESP QL NAA+PROBE: NEGATIVE

## 2023-09-06 PROCEDURE — 96372 THER/PROPH/DIAG INJ SC/IM: CPT | Performed by: NURSE PRACTITIONER

## 2023-09-06 PROCEDURE — 63600175 PHARM REV CODE 636 W HCPCS: Performed by: NURSE PRACTITIONER

## 2023-09-06 PROCEDURE — 99284 EMERGENCY DEPT VISIT MOD MDM: CPT

## 2023-09-06 PROCEDURE — 87635 SARS-COV-2 COVID-19 AMP PRB: CPT | Performed by: NURSE PRACTITIONER

## 2023-09-06 RX ORDER — ORPHENADRINE CITRATE 30 MG/ML
30 INJECTION INTRAMUSCULAR; INTRAVENOUS
Status: COMPLETED | OUTPATIENT
Start: 2023-09-06 | End: 2023-09-06

## 2023-09-06 RX ORDER — TRAMADOL HYDROCHLORIDE 50 MG/1
50 TABLET ORAL
Status: DISCONTINUED | OUTPATIENT
Start: 2023-09-06 | End: 2023-09-06

## 2023-09-06 RX ORDER — BACLOFEN 10 MG/1
10 TABLET ORAL 3 TIMES DAILY
Qty: 15 TABLET | Refills: 0 | Status: SHIPPED | OUTPATIENT
Start: 2023-09-06

## 2023-09-06 RX ORDER — TRAMADOL HYDROCHLORIDE 50 MG/1
50 TABLET ORAL EVERY 8 HOURS PRN
Qty: 12 EACH | Refills: 0 | Status: SHIPPED | OUTPATIENT
Start: 2023-09-06 | End: 2023-09-06

## 2023-09-06 RX ADMIN — ORPHENADRINE CITRATE 30 MG: 60 INJECTION INTRAMUSCULAR; INTRAVENOUS at 04:09

## 2023-09-06 NOTE — ED PROVIDER NOTES
Encounter Date: 9/6/2023       History     Chief Complaint   Patient presents with    Back Pain     Chronic neck and back pain. Denies recent injury.     Here for evaluation of worsening back and neck pain. Reports no new symptoms just can not get comfortable. No new trauma. History of neck and back surgery years ago. Taking Gabapentin with not much relief. Saw Ortho in [place] but awaiting second opinion with MD in [place].     The history is provided by the patient.     Review of patient's allergies indicates:   Allergen Reactions    Aspirin Other (See Comments)     GI intolerant     Bidil [isosorbide-hydralazine] Other (See Comments)     Tounge burning       Penicillins Rash    Praluent pen [alirocumab] Rash    Statins-hmg-coa reductase inhibitors     Zetia [ezetimibe] Other (See Comments)     Past Medical History:   Diagnosis Date    Abnormal myocardial perfusion study     Anticoagulant long-term use     Arthritis     BPH (benign prostatic hyperplasia)     Coronary artery disease     PTCA STENT RCA/CFX 2014 and CFX 2021    Digestive disorder     Diverticulitis 03/02/2022    Encounter for blood transfusion     GERD (gastroesophageal reflux disease)     Gout     H/O heart artery stent     High cholesterol     Hypercholesteremia     Hypertension     Kidney cysts     UTI (urinary tract infection)      Past Surgical History:   Procedure Laterality Date    ANTERIOR CERVICAL DISCECTOMY W/ FUSION  2007    BACK SURGERY      cardiac stents      CATARACT EXTRACTION, BILATERAL      COLONOSCOPY      COLONOSCOPY N/A 5/29/2017    Procedure: COLONOSCOPY;  Surgeon: Luis Bogran-Reyes, MD;  Location: [facility];  Service: Endoscopy;  Laterality: N/A;    CORONARY ANGIOGRAPHY N/A 2/2/2021    Procedure: ANGIOGRAM, CORONARY ARTERY;  Surgeon: James Nsah MD;  Location: [facility] CATH;  Service: Cardiology;  Laterality: N/A;    DIRECT LATERAL INTERBODY FUSION (DLIF) OF SPINE N/A 2/6/2023    Procedure: FUSION, SPINE, DLIF, PRONE  LATERAL INTERBODY FUSION W/ BMP, L3-4;  Surgeon: Hunter Langston MD;  Location: [facility] OR;  Service: Orthopedics;  Laterality: N/A;  Alphatech for Prone, Nuvasive for PSF    ESOPHAGOGASTRODUODENOSCOPY      EYE SURGERY      Bilateral cataract     FACETECTOMY OF VERTEBRA Left 2022    Procedure: FACETECTOMY, PARTIAL MEDIAL;  Surgeon: Hunter Langston MD;  Location: [facility] OR;  Service: Orthopedics;  Laterality: Left;    FORAMINOTOMY Left 2022    Procedure: FORAMINOTOMY, SPINE;  Surgeon: Hunter Langston MD;  Location: [facility] OR;  Service: Orthopedics;  Laterality: Left;    FUSION OF LUMBAR SPINE USING POSTERIOR INTERBODY TECHNIQUE N/A 2023    Procedure: FUSION, SPINE, LUMBAR, PLIF, REVISION, L2-L5 W/ REPAIR NONUNION L3-4;  Surgeon: Hunter Langston MD;  Location: [facility] OR;  Service: Orthopedics;  Laterality: N/A;    LAMINOTOMY Left 2022    Procedure: LAMINOTOMY, POSTERIOR, CERVICAL, LEFT C6-C7;  Surgeon: Hunter Langston MD;  Location: [facility] OR;  Service: Orthopedics;  Laterality: Left;    LUMBAR LAMINECTOMY  2012    L2-4, Dr. Stover    PROSTATE SURGERY       Family History   Problem Relation Age of Onset    Stroke Mother     Hypertension Mother     Heart disease Mother     Cataracts Father     Heart disease Father     Thyroid disease Sister     Blindness Brother     Heart disease Brother     Stroke Sister      Social History     Tobacco Use    Smoking status: Former     Current packs/day: 0.00     Types: Cigarettes     Quit date: 1980     Years since quittin.8    Smokeless tobacco: Never   Substance Use Topics    Alcohol use: Yes     Comment: SELDOM    Drug use: No     Review of Systems   Musculoskeletal:  Positive for arthralgias, back pain and neck pain.   All other systems reviewed and are negative.      Physical Exam     Initial Vitals [23 1615]   BP Pulse Resp Temp SpO2   (!) 195/86 63 18 98.2 °F (36.8 °C) 96 %      MAP       --         Physical Exam    Nursing note and  vitals reviewed.  Constitutional: He appears well-developed and well-nourished. He is cooperative.   HENT:   Head: Normocephalic and atraumatic.   Right Ear: External ear normal.   Left Ear: External ear normal.   Nose: Nose normal.   Mouth/Throat: Oropharynx is clear and moist.   Eyes: Conjunctivae are normal.   Neck: Neck supple.   Always has pain    Cardiovascular:  Normal rate, regular rhythm and intact distal pulses.           Pulmonary/Chest: Breath sounds normal.   Abdominal: Abdomen is soft. Bowel sounds are normal.   Musculoskeletal:         General: Normal range of motion.      Cervical back: Neck supple.     Neurological: He is alert and oriented to person, place, and time. He has normal strength.   Skin: Skin is warm and dry.   Psychiatric: He has a normal mood and affect. His behavior is normal. Judgment and thought content normal.         ED Course   Procedures  Labs Reviewed   SARS-COV-2 RDRP GENE          Imaging Results    None          Medications   orphenadrine injection 30 mg (30 mg Intramuscular Given 9/6/23 1647)     Medical Decision Making  Low back pain and neck pain with no trauma     Amount and/or Complexity of Data Reviewed  Labs: ordered.  Discussion of management or test interpretation with external provider(s): Reviewed chart. Will treat with Baclofen as can not take NSAID's with renal issues and blood thinners. Needs to call ortho clinic in am. Voiced understanding.     Risk  Prescription drug management.                               Clinical Impression:   Final diagnoses:  [M54.9] Bilateral back pain, unspecified back location, unspecified chronicity (Primary)  [M54.12] Cervical radiculopathy        ED Disposition Condition    Discharge Stable          ED Prescriptions       Medication Sig Dispense Start Date End Date Auth. Provider    traMADoL (ULTRAM) 50 mg tablet  (Status: Discontinued) Take 1 tablet (50 mg total) by mouth every 8 (eight) hours as needed for Pain. 12 each 9/6/2023  9/6/2023 Baldo De Paz NP    baclofen (LIORESAL) 10 MG tablet Take 1 tablet (10 mg total) by mouth 3 (three) times daily. 15 tablet 9/6/2023 -- Baldo De Paz NP          Follow-up Information       Follow up With Specialties Details Why Contact Info    Tho Duran Jr., MD Internal Medicine In 3 days As needed, If symptoms worsen, For further evaluation [address] [place] [phone]  [phone]               Baldo De Paz NP  09/06/23 8410       Baldo De Paz NP  09/06/23 1703

## 2023-09-20 DIAGNOSIS — J38.01 PARALYSIS OF VOCAL CORDS AND LARYNX, UNILATERAL: Primary | ICD-10-CM

## 2023-09-21 NOTE — PROGRESS NOTES
Outpatient Neurological Rehabilitation   Voice Evaluation  Date: 9/27/2023     Name: James Baltazar   MRN: [id number]    Therapy Diagnosis: No diagnosis found. 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   Qualitative Analysis of Voice and Resonance  ***   Speech Language Voice Therapy  ***   Total Untimed Units: ***  Charges Billed/# of units: ***    Precautions: {AMB PT VESTIBULAR PRECAUTIONS:54261}  Subjective    Date of Onset: ***  Current Medical History: James Baltazar is a 77 y.o. male referred for voice evaluation (CPT 37862) by Dr. Garcia.  He presents with complaints of *** which began *** ago.  The patient also reported the following complaints: {voice complaints:65624}. ***   Past Medical History:   Past Medical History:   Diagnosis Date    Abnormal myocardial perfusion study     Anticoagulant long-term use     Arthritis     BPH (benign prostatic hyperplasia)     Coronary artery disease     PTCA STENT RCA/CFX 2014 and CFX 2021    Digestive disorder     Diverticulitis 03/02/2022    Encounter for blood transfusion     GERD (gastroesophageal reflux disease)     Gout     H/O heart artery stent     High cholesterol     Hypercholesteremia     Hypertension     Kidney cysts     UTI (urinary tract infection)     James Baltazar  has a past surgical history that includes cardiac stents; Lumbar laminectomy (12/14/2012); Anterior cervical discectomy w/ fusion (2007); Cataract extraction, bilateral; Colonoscopy; Esophagogastroduodenoscopy; Colonoscopy (N/A, 5/29/2017); Prostate surgery; Coronary angiography (N/A, 2/2/2021); Eye surgery; Back surgery; Laminotomy (Left,  "8/26/2022); Foraminotomy (Left, 8/26/2022); Facetectomy of vertebra (Left, 8/26/2022); Direct lateral interbody fusion (DLIF) of spine (N/A, 2/6/2023); and Fusion of lumbar spine using posterior interbody technique (N/A, 2/6/2023).  Medical Hx and Allergies: James has a current medication list which includes the following prescription(s): amlodipine, baclofen, clopidogrel, docusate sodium, gabapentin, hydrochlorothiazide, metoprolol succinate, multivitamin, olmesartan, pregabalin, and rosuvastatin.   Review of patient's allergies indicates:   Allergen Reactions    Aspirin Other (See Comments)     GI intolerant     Bidil [isosorbide-hydralazine] Other (See Comments)     Tounge burning       Penicillins Rash    Praluent pen [alirocumab] Rash    Statins-hmg-coa reductase inhibitors     Zetia [ezetimibe] Other (See Comments)     Prior Level of Function: ***   Current Level of Function:  ***  Prior Therapy:  ***  Pain:   {0-10:20507::"0"}/10  Pain Location / Description: ***  Nutrition:  ***  Social History:  ***  Patient Therapy Goals:  ***    Swallowing: ***   Breathing: {Blank single:19197::"SOB","stridor","coughing","throat clearing","audible inhalation"}    Smoking: *** packs/day   EtOH: *** drinks/week   Caffeine: *** cups/day   Reflux: {Blank single:19197::"yes","no"}  Water: *** oz/day     Laryngeal endoscopy {and and/or or:86569} stroboscopy findings per *** on ***:  ***       Objective      Perceptual assessment:    -Quality: {vocal quality:51552}  -Volume: {vocal intensity:19044}  -Pitch: {vocal pitch:01833}  -Flexibility: {vocal flexibility:44869}    Habitual respiratory pattern: {Blank single:19197::"chest/clavicular","diaphragmatic","breath holding"}.  MPT (ah > 18s WFL): *** seconds  S/Z ratio (ratio of 1.4+ may indicate a degree of vocal fold dysfunction): ***    Voice Handicap Index (VHI): These are statements that many people have used to describe their voices and the effects of their voices on their " "lives.   0=never  1=almost never  2=sometimes  3=almost always   4=always     My voice makes it difficult for people to hear me: ***  People have difficulty understanding me in a noisy room: ***   My family has difficulty hearing me when I call then throughout the house: ***  I used the phone less often than I would like to: ***  I tend to avoid groups of people because of my voice: ***  I speak with friends, neighbors, or relatives less often because of my voice: ***  People ask me to repeat myself when speaking face-to-face: ***  My voice difficulties restrict my personal and social life: ***  I feel left out of conversation because of my voice: ***  My voice problem causes me to lose income: ***  Subtotal: ***    11. I run out of air when I talk: ***  12. The sound of my voice varies throughout the day: ***  13. People ask, "what's wrong with your voice": ***  14. My voice sounds creaky and dry: ***  15. I feel as though I have to strain to produce voice: ***  16. The clarity of my voice is unpredictable: ***  17. I try to change my voice to sound different: ***  18. I use a great deal of effort to speak: ***  19. My voice is worse in the evening: ***  20. My voice "gives out" on me in the middle of speaking: ***  Subtotal: ***    21. I am tense when talking to others because of my voice: ***  22. People seem irritated with my voice: ***  23. I find other people don't understand my voice problem: ***  24. My voice problem upsets me: ***  25. I am less outgoing because of my voice problem: ***  26. My voice makes me feel handicapped: ***  27. I feel annoyed when people ask me to repeat: ***  28. I feel embarrassed when people ask me to repeat: ***  29: My voice makes me feel incompetent: ***  30: I am ashamed of my voice problem: ***   Subtotal: ***    Total: ***     0-30: mild severity- minimal amount of handicap   31-60: moderate severity- often seen in pt. With vocal nodules, polyps, or cysts.   : severe " "severity- Often seen in patients with vocal fold paralysis or severe vocal fold scarring.      RSI (completed to assess the possible presence and/or severity of LPR symptoms and any relationship between this condition and the pt's dysphagia; score of ~15 may indicate LPR): ***    Hoarseness or a problem with your voice: ***  Clearing your throat: ***  Excess throat or mucous post-nasal drip:***  Difficulty swallowing food, liquid, or pills:***  Coughing after you ate or after lying down: ***  Breathing difficulties or choking episodes: ***  Troublesome or annoying cough: ***  Sensation or something sticking in your throat: ***  Heartburn, chest pain, indigestion, or stomach acid coming up: ***    TOTAL: ***     Treatment   Treatment Time In: {Blank single:19197::"***","n/a"}  Treatment Time Out: {Blank single:19197::"***","n/a"}    {Blank single:19197::"***","n/a"}    Education: {OP ST Education:53114} {Actions; was/were:483148} discussed with pt. Discussed importance of vocal hygiene including: {voice education:98972}.  Patient {Blank single:19197::"was","was not"} stimulable for improved voice using *** exercises.  Patient expressed understanding. ***    Home Program: ***  Assessment     Patient presents with *** dysphonia secondary to *** as diagnosed by  ***. Demonstrates impairments including limitations as described in the problem list. Positive prognostic factors include ***. Negative prognostic factors include ***. He presents with *** c/b ***. {barriers to therapy:39005} ***     Rehab Potential: {DESC; POOR/FAIR/GOOD/EXCELLENT:19665}  Pt's spiritual, cultural and educational needs considered and pt agreeable to plan of care and goals.  Short Term Goals (*** {TIME; HOURS/DAY/DAYS/WEEK/WEEKS:19187}):   ***  Long Term Goals (*** {TIME; HOURS/DAY/DAYS/WEEK/WEEKS:19187}):   ***    Plan     Plan of Care Certification Period: 9/27/2023  to ***    Recommended Treatment Plan:  Patient will participate in the " [facility] outpatient rehabilitation program for speech therapy 1x/week for *** weeks to address his  voice  deficits, to optimize glottal postures for improved vocal function, vocal efficiency, ease of phonation, educate patient and their family, and to participate in a home exercise program.    Other Recommendations:   -Continue exercises as discussed in session  -Contact clinician with any further questions  ***      Krystal Weaver M.S., CF-SLP   Speech-Language Pathologist  9/27/2023

## 2023-09-27 ENCOUNTER — CLINICAL SUPPORT (OUTPATIENT)
Dept: REHABILITATION | Facility: HOSPITAL | Age: 78
End: 2023-09-27
Attending: OTOLARYNGOLOGY
Payer: MEDICARE

## 2023-09-27 DIAGNOSIS — R49.0 DYSPHONIA: Primary | ICD-10-CM

## 2023-09-27 PROCEDURE — 92524 BEHAVRAL QUALIT ANALYS VOICE: CPT

## 2023-09-27 NOTE — PLAN OF CARE
"Outpatient Neurological Rehabilitation   Voice Evaluation  Date: 9/27/2023     Name: James Baltazar   MRN: [id number]    Therapy Diagnosis:   Encounter Diagnosis   Name Primary?    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   Qualitative Analysis of Voice and Resonance  37   Speech Language Voice Therapy  10   Total Untimed Units: 2  Charges Billed/# of units: 2    Precautions: Fall  Subjective    Date of Onset: "a couple years ago"  Current Medical History: James Baltazar is a 77 y.o. male referred for voice evaluation (CPT 59195) by Dr. Garcia.  He presents with complaints of paralyzed vocal cord which began "about a couple years ago." The patient also reported the following complaints: fatigue with use, difficulty with singing, and hoarseness . Pt reports difficulty getting a high pitch when singing.   Past Medical History:   Past Medical History:   Diagnosis Date    Abnormal myocardial perfusion study     Anticoagulant long-term use     Arthritis     BPH (benign prostatic hyperplasia)     Coronary artery disease     PTCA STENT RCA/CFX 2014 and CFX 2021    Digestive disorder     Diverticulitis 03/02/2022    Encounter for blood transfusion     GERD (gastroesophageal reflux disease)     Gout     H/O heart artery stent     High cholesterol     Hypercholesteremia     Hypertension     Kidney cysts     UTI (urinary tract infection)     James Baltazar  has a past surgical history that includes cardiac stents; Lumbar laminectomy (12/14/2012); Anterior cervical discectomy w/ fusion (2007); Cataract extraction, bilateral; Colonoscopy; " "Esophagogastroduodenoscopy; Colonoscopy (N/A, 5/29/2017); Prostate surgery; Coronary angiography (N/A, 2/2/2021); Eye surgery; Back surgery; Laminotomy (Left, 8/26/2022); Foraminotomy (Left, 8/26/2022); Facetectomy of vertebra (Left, 8/26/2022); Direct lateral interbody fusion (DLIF) of spine (N/A, 2/6/2023); and Fusion of lumbar spine using posterior interbody technique (N/A, 2/6/2023).  Medical Hx and Allergies: James has a current medication list which includes the following prescription(s): amlodipine, baclofen, clopidogrel, docusate sodium, gabapentin, hydrochlorothiazide, metoprolol succinate, multivitamin, olmesartan, pregabalin, and rosuvastatin.   Review of patient's allergies indicates:   Allergen Reactions    Aspirin Other (See Comments)     GI intolerant     Bidil [isosorbide-hydralazine] Other (See Comments)     Tounge burning       Penicillins Rash    Praluent pen [alirocumab] Rash    Statins-hmg-coa reductase inhibitors     Zetia [ezetimibe] Other (See Comments)     Prior Level of Function: [employer] in [employer]   Current Level of Function:  difficulty w/ singing; currently not singing in choir; hoarseness  Prior Therapy:  currently in PT for back problems  Pain:   0/10  Pain Location / Description: n/a  Social History:  pt lives at home w/ wife and son  Patient Therapy Goals:  to get back in the choir    Swallowing: pt denies difficulty w/ swallowing   Breathing:  perceptually adequate     Smoking: former; pt reports he quit smoking in 1985  EtOH: "maybe 2 bottles of wine a year"  Caffeine: ~2 cups/week   Reflux: yes  Water: ~1 gallon per day    Objective      Perceptual assessment:    -Quality: rough and hoarse    -Volume: appropriate for age and gender  -Pitch: appropriate for age and gender  -Flexibility: appropriate for age and gender    Habitual respiratory pattern: chest/clavicular.  MPT (ah > 18s WFL): 25.91 seconds  S/Z ratio (ratio of 1.4+ may indicate a degree of vocal fold dysfunction): " "0.50    Voice Handicap Index (VHI): These are statements that many people have used to describe their voices and the effects of their voices on their lives.   0=never  1=almost never  2=sometimes  3=almost always   4=always     My voice makes it difficult for people to hear me: 2  People have difficulty understanding me in a noisy room: 0   My family has difficulty hearing me when I call then throughout the house: 2  I used the phone less often than I would like to: 0  I tend to avoid groups of people because of my voice: 0  I speak with friends, neighbors, or relatives less often because of my voice: 0  People ask me to repeat myself when speaking face-to-face: 0  My voice difficulties restrict my personal and social life: 0  I feel left out of conversation because of my voice: 0  My voice problem causes me to lose income: 0  Subtotal: 4    11. I run out of air when I talk: 0  12. The sound of my voice varies throughout the day: 2  13. People ask, "what's wrong with your voice": 0  14. My voice sounds creaky and dry: 0  15. I feel as though I have to strain to produce voice: 2  16. The clarity of my voice is unpredictable: 2  17. I try to change my voice to sound different: 0  18. I use a great deal of effort to speak: 2  19. My voice is worse in the evenin  20. My voice "gives out" on me in the middle of speakin  Subtotal: 8    21. I am tense when talking to others because of my voice: 0  22. People seem irritated with my voice: 0  23. I find other people don't understand my voice problem: 0  24. My voice problem upsets me: 2  25. I am less outgoing because of my voice problem: 0  26. My voice makes me feel handicapped: 0  27. I feel annoyed when people ask me to repeat: 2  28. I feel embarrassed when people ask me to repeat: 0  29: My voice makes me feel incompetent: 2  30: I am ashamed of my voice problem: 0   Subtotal: 6    Total: 18     0-30: mild severity- minimal amount of handicap   31-60: moderate " "severity- often seen in pt. With vocal nodules, polyps, or cysts.   : severe severity- Often seen in patients with vocal fold paralysis or severe vocal fold scarring.      Reflux Symptom Index (RSI) was completed to assess the possible presence and/or severity of LPR symptoms and any relationship between this condition and the pt's dysphagia; score of ~15 may indicate LPR. Results are as follows:     Hoarseness or a problem with your voice: 4  Clearing your throat: 3  Excess throat or mucous post-nasal drip:5  Difficulty swallowing food, liquid, or pills:0  Coughing after you ate or after lying down: 4  Breathing difficulties or choking episodes: 0  Troublesome or annoying cough: 3  Sensation or something sticking in your throat: 4  Heartburn, chest pain, indigestion, or stomach acid coming up: 3    TOTAL: 26     Treatment   Treatment Time In:  1135  Treatment Time Out:  1145    Pt completed the following semi occluded vocal tract (SOVT) exercises:  Straw phonation  Sustained /z/ and /v/  Humming  Kazoo sound  "Zoom, zoom, zoom"    Education: Plan of Care, role of SLP in care, and vocal hygeine were discussed with pt. Discussed importance of vocal hygiene including: hydration, conservation, and reducing throat clearing, coughing, or other phonotraumatic behaviors. Patient was stimulable for improved voice using SOVT exercises. Patient verbalized understanding.     Home Program: semi-occluded vocal tract (SOVT) exercises 3-5x/day; handout provided  Assessment     Patient presents with dysphonia secondary to Paralysis of vocal cords and larynx, unilateral as diagnosed by Dr. Mauricio Garcia. Demonstrates impairments including limitations as described in the problem list. Positive prognostic factors include motivation and participation. Negative prognostic factors include unilateral VF and laryngeal paralysis. He presents with decreased vocal quality c/b hoarseness. No barriers to therapy identified. Pt would " benefit from skilled, outpatient ST services.     Rehab Potential: good  Pt's spiritual, cultural and educational needs considered and pt agreeable to plan of care and goals.    Short Term Goals: (4 weeks)   1. Pt will complete SOVT exercises and/or resonant-focused exercises 3-5x daily to improve resonant voice.   2. Pt will improve coordination of respiration and phonation for efficient vocal production at a conversational level.     Long Term Goals (4 weeks):   Pt will demonstrate improved vocal quality and intonation at the conversation level to communicate basic medical and social needs in the functional living environment.   Pt will facilitate changes in vocal function to restore functional use of voice for daily occupational, social, and emotional demands.      Plan     Plan of Care Certification Period: 9/27/2023  to 10/27/2023    Recommended Treatment Plan:  Patient will participate in the [facility] outpatient rehabilitation program for speech therapy 1 time per week to address his  voice  deficits, to optimize glottal postures for improved vocal function, vocal efficiency, ease of phonation, educate patient and their family, and to participate in a home exercise program.    Other Recommendations:   -Continue exercises as discussed in session  -Contact clinician with any further questions      Krystal Weaver M.S., CF-SLP   Speech-Language Pathologist  9/27/2023

## 2023-10-02 ENCOUNTER — CLINICAL SUPPORT (OUTPATIENT)
Dept: REHABILITATION | Facility: HOSPITAL | Age: 78
End: 2023-10-02
Payer: MEDICARE

## 2023-10-02 DIAGNOSIS — R49.0 DYSPHONIA: Primary | ICD-10-CM

## 2023-10-02 PROCEDURE — 92507 TX SP LANG VOICE COMM INDIV: CPT

## 2023-10-02 NOTE — PROGRESS NOTES
"[facility]  Speech Therapy Treatment Note - Voice    Date: 10/2/2023     Name: James Baltazar   MRN: [id number]   Therapy Diagnosis:   Encounter Diagnosis   Name Primary?    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 Pt endorses "hoarseness" to vocal quality but pt denies laryngeal pain. Pt cooperative w/ ST.    He was compliant to home exercise program.   Response to previous treatment: good   Pain Scale: 6/10 on a Numerical Rating Scale currently.   Pain Location: posterior neck  Objective:   UNTIMED  Procedure Min.   Speech- Language- Voice Therapy  30   Total Timed Units: 0  Total Untimed Units: 1  Charges Billed/Number of units: 1    Short-Term Goals: (4 weeks) Current Progress:   1. Pt will complete SOVT exercises and/or resonant-focused exercises 3-5x daily to improve resonant voice.    Progressing / Not Met 10/2/2023 Pt reports completion of SOVT exercises daily at least 3x per day.    Pt completes SOVT exercises w/ SLP instruction and moderate verbal/visual/tactile cues this date.   Straw phonation  Sustained /z/ and /v/  Humming  Kazoo sound  "Zoom, zoom, zoom"    Pt also completes pitch glides and singing familiar song w/ intonation.        2. Pt will improve coordination of respiration and phonation for efficient vocal production at a conversational level.    Progressing / Not Met 10/2/2023 Pt " provided educational video of respiration and phonation production; pt v/u.     Pt completes respiration and phonation exercises.    Pt completes longest duration of /s/ at 7.89 sec and longest duration of /z/ at 13/43; pt requires max cues for production of /z/.      Long Term Goals (4 weeks):   Pt will demonstrate improved vocal quality and intonation at the conversation level to communicate basic medical and social needs in the functional living environment.   Pt will facilitate changes in vocal function to restore functional use of voice for daily occupational, social, and emotional demands.      Patient Education/Response:   SLP provided patient with education surrounding current levels of impairment, home exercise program, and ongoing intervention. Patient verbalized understanding.     Home program established: Patient instructed to continue prior program  Exercises were reviewed and James was able to demonstrate them prior to the end of the session. James demonstrated good understanding of the education provided.   Assessment:   James is progressing well towards his goals. Session targeting improving vocal quality through SOVT exercise, respiration/phonation exercise, and education. He continues to present with decreased vocal quality c/b hoarseness. No barriers to therapy identified. Pt continues to benefit from skilled, outpatient ST services. Current goals remain appropriate. Goals to be updated as necessary.     Patient prognosis is Good. Patient will continue to benefit from skilled outpatient speech and language therapy to address the deficits listed in the problem list on initial evaluation, provide patient/family education, and maximize patient's level of independence in the home and community environment.     Barriers to Therapy: none  Patient's spiritual, cultural and educational needs considered and patient agreeable to plan of care and goals.  Plan:   Continue Plan of Care with focus on voice  therapy.    Lazaro Lr M.S. CCC-SLP  Speech-Language Pathologist  10/2/2023

## 2023-10-09 ENCOUNTER — CLINICAL SUPPORT (OUTPATIENT)
Dept: REHABILITATION | Facility: HOSPITAL | Age: 78
End: 2023-10-09
Payer: MEDICARE

## 2023-10-09 DIAGNOSIS — R49.0 DYSPHONIA: Primary | ICD-10-CM

## 2023-10-09 PROCEDURE — 92507 TX SP LANG VOICE COMM INDIV: CPT

## 2023-10-09 NOTE — PROGRESS NOTES
"[facility]  Speech Therapy Treatment Note - Voice    Date: 10/9/2023     Name: James Baltazar   MRN: [id number]   Therapy Diagnosis:   Encounter Diagnosis   Name Primary?    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  He was intermittently compliant to home exercise program.   Response to previous treatment: good   Pain Scale: 8/10 on a Numerical Rating Scale currently.   Pain Location: posterior neck and lower back   Objective:   UNTIMED  Procedure Min.   Speech- Language- Voice Therapy  30   Total Timed Units: 0  Total Untimed Units: 1  Charges Billed/Number of units: 1    Short-Term Goals: (4 weeks) Current Progress:   1. Pt will complete SOVT exercises and/or resonant-focused exercises 3-5x daily to improve resonant voice.    Progressing / Not Met 10/9/2023 Pt reports intermittent completion of SOVT exercises at least 3x per day when completed; states that sometimes he "skips a day."    Pt completes the following SOVT exercises w/ SLP instruction and min-mod verbal/visual/tactile cues:  Straw phonation w/ water  Straw phonation w/o water  Sustained vowel  Sustained /z/ and /v/  Hand-over-mouth  Wave in a cave  Humming    Humming and straw phonation exercises were most effective for voice stimulability this date.    2. Pt will improve coordination of respiration and phonation for " efficient vocal production at a conversational level.    Progressing / Not Met 10/9/2023 Following a model, pt completes diaphragmatic breathing exercise w/ min verbal and visual cues.     Next, pt completes diaphragmatic breathing exercise while counting a progressively increasing series of numbers, such as 1 (breath), 1-2 (breath), 1-2-3 (breath), and so on.     Long Term Goals (4 weeks):   Pt will demonstrate improved vocal quality and intonation at the conversation level to communicate basic medical and social needs in the functional living environment.   Pt will facilitate changes in vocal function to restore functional use of voice for daily occupational, social, and emotional demands.      Patient Education/Response:   SLP provided patient with education surrounding current levels of impairment, home exercise program, and ongoing intervention. Patient verbalized understanding.     Home program established: Patient instructed to continue prior program  Exercises were reviewed and James was able to demonstrate them prior to the end of the session. James demonstrated good understanding of the education provided.   Assessment:   James is progressing well towards his goals. Today's session targeted improving vocal quality through SOVT and respiration/phonation exercises. Pt continues to present w/ decreased vocal quality c/b hoarseness; however, pt is stimulable for improved vocal quality using humming and straw phonation exercises. Current goals remain appropriate. Goals to be updated as necessary.     Patient prognosis is Good. Patient will continue to benefit from skilled, outpatient ST to address the deficits listed in the problem list on initial evaluation, provide patient/family education, and maximize patient's level of independence in the home and community environment.     Barriers to Therapy: none  Patient's spiritual, cultural and educational needs considered and patient agreeable to plan of care and  goals.  Plan:   Continue Plan of Care with focus on voice therapy.    Krystal Weaver M.S., CF-SLP  Speech-Language Pathologist  10/9/2023

## 2023-10-16 ENCOUNTER — DOCUMENTATION ONLY (OUTPATIENT)
Dept: REHABILITATION | Facility: HOSPITAL | Age: 78
End: 2023-10-16
Payer: MEDICARE

## 2023-10-16 NOTE — PROGRESS NOTES
"[facility]  Speech-Language Pathology    Patient: James Baltazar  Date of Session: 10/16/2023  MRN: [id number]    Pt cancelled today's ST appointment. Reason for cancellation: "not feeling well today." Next scheduled appointment is 10/23/2023. No charges have been posted today.      Cancellations: 1  No shows: 0    Krystal Weaver M.S., CF-SLP  Speech-Language Pathologist  10/16/2023     "

## 2023-10-23 ENCOUNTER — DOCUMENTATION ONLY (OUTPATIENT)
Dept: REHABILITATION | Facility: HOSPITAL | Age: 78
End: 2023-10-23
Payer: MEDICARE

## 2023-10-23 NOTE — PROGRESS NOTES
[facility]  Speech-Language Pathology    Patient: James Baltazar  Date of Session: 10/23/2023  MRN: [id number]    James Baltazar did not attend his scheduled ST appointment today. Pt did not call to cancel/reschedule. Today was pt's last scheduled visit prior to POC expiration on 10/27/2023. Attempted to follow up w/ pt via phone call following missed appointment; however, pt did not answer. Will complete 2nd follow-up attempt via phone call tomorrow, 10/24/2023. No charges have been posted today.     Cancellations: 1  No shows: 1    Krystal Weaver M.S., CF-SLP  Speech-Language Pathologist   10/23/2023

## 2023-11-07 ENCOUNTER — DOCUMENTATION ONLY (OUTPATIENT)
Dept: REHABILITATION | Facility: HOSPITAL | Age: 78
End: 2023-11-07
Payer: MEDICARE

## 2023-11-07 DIAGNOSIS — R49.0 DYSPHONIA: Primary | ICD-10-CM

## 2023-11-07 NOTE — PROGRESS NOTES
[facility]  Speech-Language Pathology     Outpatient Therapy Discharge Summary   Discharge Date: 2023   Name: James Baltazar  Clinic Number: [id number]  Therapy Diagnosis:   Encounter Diagnosis   Name Primary?    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 Mauricio Garcia. Demonstrates impairments including limitations as described in the problem list. Positive prognostic factors include motivation and participation. Negative prognostic factors include unilateral VF and laryngeal paralysis. He presents with decreased vocal quality c/b hoarseness. No barriers to therapy identified. Pt would benefit from skilled, outpatient ST services.      Short Term Goals: (4 weeks) Current Progress:   1. Pt will complete SOVT exercises and/or resonant-focused exercises 3-5x daily to improve resonant voice. See f/u note dated 10/9/2023   2. Pt will improve coordination of respiration and phonation for efficient vocal production at a conversational level. See f/u note dated 10/9/2023      Long Term Goals (4 weeks):   Pt will demonstrate improved vocal quality and intonation at the conversation level to communicate basic medical and social needs in the functional living environment.   Pt will facilitate changes in vocal function to restore functional use of voice for daily occupational, social, and emotional demands.      Discharge reason: Patient has not attended therapy since 10/9/2023. Pt's POC  10/27/2023. Attempted to contact pt via phone call to discuss ST  d/c vs. ongoing intervention; unable to reach pt. Pt has not returned phone call.    Plan   This patient is discharged from skilled, outpatient ST services at this time.    Krystal Weaver M.S., CF-SLP  11/7/2023       Physician Name: _______________________________    Physician Signature: ____________________________

## 2023-12-07 ENCOUNTER — LAB VISIT (OUTPATIENT)
Dept: LAB | Facility: HOSPITAL | Age: 78
End: 2023-12-07
Attending: INTERNAL MEDICINE
Payer: MEDICARE

## 2023-12-07 DIAGNOSIS — I12.9 PARENCHYMAL RENAL HYPERTENSION: ICD-10-CM

## 2023-12-07 DIAGNOSIS — N13.8 ENLARGED PROSTATE WITH URINARY OBSTRUCTION: Primary | ICD-10-CM

## 2023-12-07 DIAGNOSIS — N40.1 ENLARGED PROSTATE WITH URINARY OBSTRUCTION: Primary | ICD-10-CM

## 2023-12-07 DIAGNOSIS — Q63.1 CONGENITAL FUSION OF KIDNEYS: ICD-10-CM

## 2023-12-07 LAB
ALBUMIN SERPL BCP-MCNC: 3.7 G/DL (ref 3.5–5.2)
ALP SERPL-CCNC: 118 U/L (ref 55–135)
ALT SERPL W/O P-5'-P-CCNC: 25 U/L (ref 10–44)
ANION GAP SERPL CALC-SCNC: 3 MMOL/L (ref 3–11)
AST SERPL-CCNC: 32 U/L (ref 10–40)
BASOPHILS # BLD AUTO: 0 K/UL (ref 0–0.2)
BASOPHILS NFR BLD: 0 % (ref 0–1.9)
BILIRUB SERPL-MCNC: 0.5 MG/DL (ref 0.1–1)
BUN SERPL-MCNC: 15 MG/DL (ref 8–23)
CALCIUM SERPL-MCNC: 9.3 MG/DL (ref 8.7–10.5)
CHLORIDE SERPL-SCNC: 107 MMOL/L (ref 95–110)
CO2 SERPL-SCNC: 29 MMOL/L (ref 23–29)
CREAT SERPL-MCNC: 1.4 MG/DL (ref 0.5–1.4)
DIFFERENTIAL METHOD: ABNORMAL
EOSINOPHIL # BLD AUTO: 0 K/UL (ref 0–0.5)
EOSINOPHIL NFR BLD: 0 % (ref 0–8)
ERYTHROCYTE [DISTWIDTH] IN BLOOD BY AUTOMATED COUNT: 13.3 % (ref 11.5–14.5)
EST. GFR  (NO RACE VARIABLE): 51.8 ML/MIN/1.73 M^2
GLUCOSE SERPL-MCNC: 102 MG/DL (ref 70–110)
HCT VFR BLD AUTO: 39.6 % (ref 40–54)
HGB BLD-MCNC: 12.9 G/DL (ref 14–18)
IMM GRANULOCYTES # BLD AUTO: 0.02 K/UL (ref 0–0.04)
IMM GRANULOCYTES NFR BLD AUTO: 0.3 % (ref 0–0.5)
LYMPHOCYTES # BLD AUTO: 1.4 K/UL (ref 1–4.8)
LYMPHOCYTES NFR BLD: 23.8 % (ref 18–48)
MCH RBC QN AUTO: 28.5 PG (ref 27–31)
MCHC RBC AUTO-ENTMCNC: 32.6 G/DL (ref 32–36)
MCV RBC AUTO: 88 FL (ref 82–98)
MONOCYTES # BLD AUTO: 0.4 K/UL (ref 0.3–1)
MONOCYTES NFR BLD: 7.5 % (ref 4–15)
NEUTROPHILS # BLD AUTO: 3.9 K/UL (ref 1.8–7.7)
NEUTROPHILS NFR BLD: 68.4 % (ref 38–73)
NRBC BLD-RTO: 0 /100 WBC
PLATELET # BLD AUTO: 164 K/UL (ref 150–450)
PMV BLD AUTO: 9.4 FL (ref 9.2–12.9)
POTASSIUM SERPL-SCNC: 4.2 MMOL/L (ref 3.5–5.1)
PROT SERPL-MCNC: 8 G/DL (ref 6–8.4)
RBC # BLD AUTO: 4.52 M/UL (ref 4.6–6.2)
SODIUM SERPL-SCNC: 139 MMOL/L (ref 136–145)
WBC # BLD AUTO: 5.75 K/UL (ref 3.9–12.7)

## 2023-12-07 PROCEDURE — 36415 COLL VENOUS BLD VENIPUNCTURE: CPT | Performed by: INTERNAL MEDICINE

## 2023-12-07 PROCEDURE — 85025 COMPLETE CBC W/AUTO DIFF WBC: CPT | Performed by: INTERNAL MEDICINE

## 2023-12-07 PROCEDURE — 80053 COMPREHEN METABOLIC PANEL: CPT | Performed by: INTERNAL MEDICINE

## 2023-12-13 ENCOUNTER — LAB VISIT (OUTPATIENT)
Dept: LAB | Facility: HOSPITAL | Age: 78
End: 2023-12-13
Attending: INTERNAL MEDICINE
Payer: MEDICARE

## 2023-12-13 DIAGNOSIS — I25.10 ATHEROSCLEROSIS OF NATIVE CORONARY ARTERY OF NATIVE HEART WITHOUT ANGINA PECTORIS: ICD-10-CM

## 2023-12-13 DIAGNOSIS — I25.118 CORONARY ARTERY DISEASE OF NATIVE ARTERY OF NATIVE HEART WITH STABLE ANGINA PECTORIS: ICD-10-CM

## 2023-12-13 DIAGNOSIS — E78.2 MIXED HYPERLIPIDEMIA: ICD-10-CM

## 2023-12-13 DIAGNOSIS — R73.01 IFG (IMPAIRED FASTING GLUCOSE): ICD-10-CM

## 2023-12-13 DIAGNOSIS — E88.89 DEFICIENCY OF COENZYME Q10: ICD-10-CM

## 2023-12-13 DIAGNOSIS — I10 ESSENTIAL HYPERTENSION: ICD-10-CM

## 2023-12-13 DIAGNOSIS — Z79.899 ENCOUNTER FOR LONG-TERM (CURRENT) USE OF OTHER MEDICATIONS: ICD-10-CM

## 2023-12-13 DIAGNOSIS — N18.32 STAGE 3B CHRONIC KIDNEY DISEASE: ICD-10-CM

## 2023-12-13 DIAGNOSIS — E63.0 ESSENTIAL FATTY ACID DEFICIENCY: ICD-10-CM

## 2023-12-13 LAB
ALBUMIN SERPL BCP-MCNC: 4 G/DL (ref 3.5–5.2)
ALP SERPL-CCNC: 141 U/L (ref 55–135)
ALT SERPL W/O P-5'-P-CCNC: 31 U/L (ref 10–44)
ANION GAP SERPL CALC-SCNC: 5 MMOL/L (ref 3–11)
AST SERPL-CCNC: 27 U/L (ref 10–40)
BASOPHILS # BLD AUTO: 0 K/UL (ref 0–0.2)
BASOPHILS NFR BLD: 0 % (ref 0–1.9)
BILIRUB SERPL-MCNC: 0.6 MG/DL (ref 0.1–1)
BUN SERPL-MCNC: 21 MG/DL (ref 8–23)
CALCIUM SERPL-MCNC: 9.8 MG/DL (ref 8.7–10.5)
CHLORIDE SERPL-SCNC: 106 MMOL/L (ref 95–110)
CHOLEST SERPL-MCNC: 177 MG/DL (ref 120–199)
CHOLEST/HDLC SERPL: 2.8 {RATIO} (ref 2–5)
CO2 SERPL-SCNC: 28 MMOL/L (ref 23–29)
CREAT SERPL-MCNC: 1.5 MG/DL (ref 0.5–1.4)
DIFFERENTIAL METHOD: NORMAL
EOSINOPHIL # BLD AUTO: 0 K/UL (ref 0–0.5)
EOSINOPHIL NFR BLD: 0 % (ref 0–8)
ERYTHROCYTE [DISTWIDTH] IN BLOOD BY AUTOMATED COUNT: 13.1 % (ref 11.5–14.5)
EST. GFR  (NO RACE VARIABLE): 47.7 ML/MIN/1.73 M^2
ESTIMATED AVG GLUCOSE: 120 MG/DL (ref 68–131)
GLUCOSE SERPL-MCNC: 97 MG/DL (ref 70–110)
HBA1C MFR BLD: 5.8 % (ref 4–5.6)
HCT VFR BLD AUTO: 42.5 % (ref 40–54)
HDLC SERPL-MCNC: 63 MG/DL (ref 40–75)
HDLC SERPL: 35.6 % (ref 20–50)
HGB BLD-MCNC: 14 G/DL (ref 14–18)
IMM GRANULOCYTES # BLD AUTO: 0.02 K/UL (ref 0–0.04)
IMM GRANULOCYTES NFR BLD AUTO: 0.3 % (ref 0–0.5)
LDLC SERPL CALC-MCNC: 99.4 MG/DL (ref 63–159)
LYMPHOCYTES # BLD AUTO: 1.4 K/UL (ref 1–4.8)
LYMPHOCYTES NFR BLD: 23.4 % (ref 18–48)
MAGNESIUM SERPL-MCNC: 2.3 MG/DL (ref 1.6–2.6)
MCH RBC QN AUTO: 28.7 PG (ref 27–31)
MCHC RBC AUTO-ENTMCNC: 32.9 G/DL (ref 32–36)
MCV RBC AUTO: 87 FL (ref 82–98)
MONOCYTES # BLD AUTO: 0.4 K/UL (ref 0.3–1)
MONOCYTES NFR BLD: 6.9 % (ref 4–15)
NEUTROPHILS # BLD AUTO: 4.3 K/UL (ref 1.8–7.7)
NEUTROPHILS NFR BLD: 69.4 % (ref 38–73)
NONHDLC SERPL-MCNC: 114 MG/DL
NRBC BLD-RTO: 0 /100 WBC
PLATELET # BLD AUTO: 163 K/UL (ref 150–450)
PMV BLD AUTO: 10.7 FL (ref 9.2–12.9)
POTASSIUM SERPL-SCNC: 3.7 MMOL/L (ref 3.5–5.1)
PROT SERPL-MCNC: 8.9 G/DL (ref 6–8.4)
RBC # BLD AUTO: 4.87 M/UL (ref 4.6–6.2)
SODIUM SERPL-SCNC: 139 MMOL/L (ref 136–145)
TRIGL SERPL-MCNC: 73 MG/DL (ref 30–150)
WBC # BLD AUTO: 6.12 K/UL (ref 3.9–12.7)

## 2023-12-13 PROCEDURE — 80053 COMPREHEN METABOLIC PANEL: CPT | Performed by: INTERNAL MEDICINE

## 2023-12-13 PROCEDURE — 83735 ASSAY OF MAGNESIUM: CPT | Performed by: INTERNAL MEDICINE

## 2023-12-13 PROCEDURE — 85025 COMPLETE CBC W/AUTO DIFF WBC: CPT | Performed by: INTERNAL MEDICINE

## 2023-12-13 PROCEDURE — 36415 COLL VENOUS BLD VENIPUNCTURE: CPT | Performed by: INTERNAL MEDICINE

## 2023-12-13 PROCEDURE — 83036 HEMOGLOBIN GLYCOSYLATED A1C: CPT | Performed by: INTERNAL MEDICINE

## 2023-12-13 PROCEDURE — 80061 LIPID PANEL: CPT | Performed by: INTERNAL MEDICINE

## 2023-12-20 ENCOUNTER — HOSPITAL ENCOUNTER (EMERGENCY)
Facility: HOSPITAL | Age: 78
Discharge: HOME | End: 2023-12-20
Attending: EMERGENCY MEDICINE
Payer: MEDICARE

## 2023-12-20 VITALS
TEMPERATURE: 98 F | HEIGHT: 73 IN | HEART RATE: 99 BPM | OXYGEN SATURATION: 96 % | SYSTOLIC BLOOD PRESSURE: 183 MMHG | RESPIRATION RATE: 18 BRPM | WEIGHT: 180 LBS | BODY MASS INDEX: 23.86 KG/M2 | DIASTOLIC BLOOD PRESSURE: 95 MMHG

## 2023-12-20 DIAGNOSIS — U07.1 COVID: Primary | ICD-10-CM

## 2023-12-20 DIAGNOSIS — R05.9 COUGH: ICD-10-CM

## 2023-12-20 DIAGNOSIS — U07.1 COVID-19 VIRUS DETECTED: ICD-10-CM

## 2023-12-20 LAB
CTP QC/QA: YES
CTP QC/QA: YES
POC MOLECULAR INFLUENZA A AGN: NEGATIVE
POC MOLECULAR INFLUENZA B AGN: NEGATIVE
SARS-COV-2 RDRP RESP QL NAA+PROBE: POSITIVE

## 2023-12-20 PROCEDURE — 99284 EMERGENCY DEPT VISIT MOD MDM: CPT | Mod: 25

## 2023-12-20 PROCEDURE — 87502 INFLUENZA DNA AMP PROBE: CPT

## 2023-12-20 PROCEDURE — 87635 SARS-COV-2 COVID-19 AMP PRB: CPT | Performed by: CLINICAL NURSE SPECIALIST

## 2023-12-20 RX ORDER — ALBUTEROL SULFATE 90 UG/1
2 AEROSOL, METERED RESPIRATORY (INHALATION) EVERY 6 HOURS PRN
Qty: 18 G | Refills: 0 | Status: SHIPPED | OUTPATIENT
Start: 2023-12-20 | End: 2024-01-23 | Stop reason: SDUPTHER

## 2023-12-20 RX ORDER — PROMETHAZINE HYDROCHLORIDE AND DEXTROMETHORPHAN HYDROBROMIDE 6.25; 15 MG/5ML; MG/5ML
10 SYRUP ORAL EVERY 6 HOURS PRN
Qty: 120 ML | Refills: 0 | Status: SHIPPED | OUTPATIENT
Start: 2023-12-20 | End: 2023-12-30

## 2023-12-20 RX ORDER — BUTALBITAL, ACETAMINOPHEN AND CAFFEINE 50; 325; 40 MG/1; MG/1; MG/1
1 TABLET ORAL EVERY 4 HOURS PRN
Qty: 10 TABLET | Refills: 0 | Status: SHIPPED | OUTPATIENT
Start: 2023-12-20 | End: 2024-01-19

## 2023-12-20 RX ORDER — CETIRIZINE HYDROCHLORIDE 10 MG/1
10 TABLET ORAL DAILY
Qty: 30 TABLET | Refills: 0 | Status: SHIPPED | OUTPATIENT
Start: 2023-12-20 | End: 2024-12-19

## 2023-12-20 RX ORDER — ONDANSETRON 4 MG/1
4 TABLET, ORALLY DISINTEGRATING ORAL EVERY 6 HOURS PRN
Qty: 10 TABLET | Refills: 0 | Status: SHIPPED | OUTPATIENT
Start: 2023-12-20

## 2023-12-20 NOTE — DISCHARGE INSTRUCTIONS
Quarantine For 5 days.  Ibuprofen or Tylenol every 3-4 hours to control body aches, headache, fever
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
## 2023-12-31 ENCOUNTER — HOSPITAL ENCOUNTER (EMERGENCY)
Facility: HOSPITAL | Age: 78
Discharge: HOME | End: 2023-12-31
Attending: EMERGENCY MEDICINE
Payer: MEDICARE

## 2023-12-31 VITALS
SYSTOLIC BLOOD PRESSURE: 149 MMHG | BODY MASS INDEX: 23.86 KG/M2 | HEART RATE: 81 BPM | OXYGEN SATURATION: 98 % | DIASTOLIC BLOOD PRESSURE: 82 MMHG | RESPIRATION RATE: 18 BRPM | TEMPERATURE: 98 F | WEIGHT: 180 LBS | HEIGHT: 73 IN

## 2023-12-31 DIAGNOSIS — F06.4 ANXIETY DISORDER DUE TO GENERAL MEDICAL CONDITION: ICD-10-CM

## 2023-12-31 DIAGNOSIS — U09.9 POST-ACUTE SEQUELAE OF COVID-19 (PASC): Primary | ICD-10-CM

## 2023-12-31 DIAGNOSIS — B34.9 VIRAL SYNDROME: ICD-10-CM

## 2023-12-31 PROCEDURE — 99284 EMERGENCY DEPT VISIT MOD MDM: CPT

## 2023-12-31 PROCEDURE — 63600175 PHARM REV CODE 636 W HCPCS: Performed by: EMERGENCY MEDICINE

## 2023-12-31 PROCEDURE — 96372 THER/PROPH/DIAG INJ SC/IM: CPT | Performed by: EMERGENCY MEDICINE

## 2023-12-31 RX ORDER — DEXAMETHASONE SODIUM PHOSPHATE 4 MG/ML
8 INJECTION, SOLUTION INTRA-ARTICULAR; INTRALESIONAL; INTRAMUSCULAR; INTRAVENOUS; SOFT TISSUE
Status: COMPLETED | OUTPATIENT
Start: 2023-12-31 | End: 2023-12-31

## 2023-12-31 RX ORDER — PROMETHAZINE HYDROCHLORIDE AND CODEINE PHOSPHATE 6.25; 1 MG/5ML; MG/5ML
5 SOLUTION ORAL EVERY 6 HOURS PRN
Qty: 120 ML | Refills: 0 | Status: SHIPPED | OUTPATIENT
Start: 2023-12-31 | End: 2024-01-10

## 2023-12-31 RX ORDER — TRAMADOL HYDROCHLORIDE 50 MG/1
50 TABLET ORAL EVERY 6 HOURS PRN
COMMUNITY
Start: 2023-12-19

## 2023-12-31 RX ADMIN — DEXAMETHASONE SODIUM PHOSPHATE 8 MG: 4 INJECTION INTRA-ARTICULAR; INTRALESIONAL; INTRAMUSCULAR; INTRAVENOUS; SOFT TISSUE at 08:12

## 2023-12-31 NOTE — ED PROVIDER NOTES
Encounter Date: 12/31/2023       History     Chief Complaint   Patient presents with    Generalized Body Aches     Pt to the ER w/ complaints of body aches x  weeks, diagnosed w/ covid.     78-year-old male recently diagnosed with COVID-19, history of chronic pain presents the emergency depart with generalized body aches.  Coughing.  Has not followed up with his primary care physician.  Oxygen saturations 99% on room air, vital signs are stable, afebrile.  Alert and oriented x4, GCS is 15.  No nausea vomiting diarrhea      Review of patient's allergies indicates:   Allergen Reactions    Aspirin Other (See Comments)     GI intolerant     Bidil [isosorbide-hydralazine] Other (See Comments)     Tounge burning       Penicillins Rash    Praluent pen [alirocumab] Rash    Statins-hmg-coa reductase inhibitors     Zetia [ezetimibe] Other (See Comments)     Past Medical History:   Diagnosis Date    Abnormal myocardial perfusion study     Anticoagulant long-term use     Arthritis     BPH (benign prostatic hyperplasia)     Coronary artery disease     PTCA STENT RCA/CFX 2014 and CFX 2021    Digestive disorder     Diverticulitis 03/02/2022    Encounter for blood transfusion     GERD (gastroesophageal reflux disease)     Gout     H/O heart artery stent     High cholesterol     Hypercholesteremia     Hypertension     Kidney cysts     UTI (urinary tract infection)      Past Surgical History:   Procedure Laterality Date    ANTERIOR CERVICAL DISCECTOMY W/ FUSION  2007    BACK SURGERY      cardiac stents      CATARACT EXTRACTION, BILATERAL      COLONOSCOPY      COLONOSCOPY N/A 5/29/2017    Procedure: COLONOSCOPY;  Surgeon: Luis Bogran-Reyes, MD;  Location: [facility];  Service: Endoscopy;  Laterality: N/A;    CORONARY ANGIOGRAPHY N/A 2/2/2021    Procedure: ANGIOGRAM, CORONARY ARTERY;  Surgeon: James Nash MD;  Location: [facility] CATH;  Service: Cardiology;  Laterality: N/A;    DIRECT LATERAL INTERBODY FUSION (DLIF) OF SPINE N/A  2023    Procedure: FUSION, SPINE, DLIF, PRONE LATERAL INTERBODY FUSION W/ BMP, L3-4;  Surgeon: Hunter Langston MD;  Location: [facility] OR;  Service: Orthopedics;  Laterality: N/A;  Alphatech for Prone, Nuvasive for PSF    ESOPHAGOGASTRODUODENOSCOPY      EYE SURGERY      Bilateral cataract     FACETECTOMY OF VERTEBRA Left 2022    Procedure: FACETECTOMY, PARTIAL MEDIAL;  Surgeon: Hunter Langston MD;  Location: [facility] OR;  Service: Orthopedics;  Laterality: Left;    FORAMINOTOMY Left 2022    Procedure: FORAMINOTOMY, SPINE;  Surgeon: Hunter Langston MD;  Location: [facility] OR;  Service: Orthopedics;  Laterality: Left;    FUSION OF LUMBAR SPINE USING POSTERIOR INTERBODY TECHNIQUE N/A 2023    Procedure: FUSION, SPINE, LUMBAR, PLIF, REVISION, L2-L5 W/ REPAIR NONUNION L3-4;  Surgeon: Hunter Langston MD;  Location: [facility] OR;  Service: Orthopedics;  Laterality: N/A;    LAMINOTOMY Left 2022    Procedure: LAMINOTOMY, POSTERIOR, CERVICAL, LEFT C6-C7;  Surgeon: Hunter Langston MD;  Location: [facility] OR;  Service: Orthopedics;  Laterality: Left;    LUMBAR LAMINECTOMY  2012    L2-4, Dr. Stover    PROSTATE SURGERY       Family History   Problem Relation Age of Onset    Stroke Mother     Hypertension Mother     Heart disease Mother     Cataracts Father     Heart disease Father     Thyroid disease Sister     Blindness Brother     Heart disease Brother     Stroke Sister      Social History     Tobacco Use    Smoking status: Former     Current packs/day: 0.00     Types: Cigarettes     Quit date: 1980     Years since quittin.1    Smokeless tobacco: Never   Substance Use Topics    Alcohol use: Yes     Comment: SELDOM    Drug use: No     Review of Systems   Constitutional:  Negative for fever.   HENT:  Negative for sore throat.    Respiratory:  Negative for shortness of breath.    Cardiovascular:  Negative for chest pain.   Gastrointestinal:  Negative for nausea.   Genitourinary:  Negative for dysuria.    Musculoskeletal:  Positive for myalgias. Negative for back pain.   Skin:  Negative for rash.   Neurological:  Negative for weakness.   Hematological:  Does not bruise/bleed easily.   All other systems reviewed and are negative.      Physical Exam     Initial Vitals [12/31/23 0817]   BP Pulse Resp Temp SpO2   (!) 153/79 85 18 97.7 °F (36.5 °C) 98 %      MAP       --         Physical Exam    Nursing note and vitals reviewed.  Constitutional: He appears well-developed and well-nourished. He is not diaphoretic. No distress.   HENT:   Head: Normocephalic and atraumatic.   Eyes: Conjunctivae and EOM are normal. Pupils are equal, round, and reactive to light. Right eye exhibits no discharge. Left eye exhibits no discharge. No scleral icterus.   Neck: Neck supple. No JVD present.   Normal range of motion.  Cardiovascular:  Normal rate, regular rhythm, normal heart sounds and intact distal pulses.           No murmur heard.  Pulmonary/Chest: Breath sounds normal. No stridor. No respiratory distress. He has no wheezes. He has no rhonchi. He has no rales. He exhibits no tenderness.   Abdominal: Abdomen is soft. Bowel sounds are normal. He exhibits no distension and no mass. There is no abdominal tenderness. There is no rebound and no guarding.   Musculoskeletal:         General: No tenderness or edema. Normal range of motion.      Cervical back: Normal range of motion and neck supple.     Neurological: He is alert and oriented to person, place, and time. He has normal strength. GCS score is 15. GCS eye subscore is 4. GCS verbal subscore is 5. GCS motor subscore is 6.   Skin: Skin is warm and dry. Capillary refill takes less than 2 seconds.         ED Course   Procedures  Labs Reviewed - No data to display       Imaging Results    None          Medications   dexAMETHasone injection 8 mg (has no administration in time range)     Medical Decision Making  Risk  Prescription drug management.                          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

## 2024-01-23 RX ORDER — ALBUTEROL SULFATE 90 UG/1
2 AEROSOL, METERED RESPIRATORY (INHALATION) EVERY 6 HOURS PRN
OUTPATIENT
Start: 2024-01-23

## 2024-02-06 ENCOUNTER — APPOINTMENT (OUTPATIENT)
Dept: LAB | Facility: HOSPITAL | Age: 79
End: 2024-02-06
Attending: OTOLARYNGOLOGY
Payer: MEDICARE

## 2024-02-06 LAB — IGE SERPL-ACNC: <35 IU/ML (ref 0–100)

## 2024-02-06 PROCEDURE — 86003 ALLG SPEC IGE CRUDE XTRC EA: CPT | Mod: 59 | Performed by: OTOLARYNGOLOGY

## 2024-02-06 PROCEDURE — 82785 ASSAY OF IGE: CPT | Performed by: OTOLARYNGOLOGY

## 2024-02-06 PROCEDURE — 86003 ALLG SPEC IGE CRUDE XTRC EA: CPT | Performed by: OTOLARYNGOLOGY

## 2024-02-06 PROCEDURE — 36415 COLL VENOUS BLD VENIPUNCTURE: CPT | Performed by: OTOLARYNGOLOGY

## 2024-02-09 LAB
A ALTERNATA IGE QN: <0.1 KU/L
A FUMIGATUS IGE QN: <0.1 KU/L
ALLERGEN BOXELDER MAPLE TREE IGE: <0.1 KU/L
ALLERGEN MAPLE (BOX ELDER) CLASS: NORMAL
ALLERGEN MULBERRY CLASS: NORMAL
ALLERGEN MULBERRY TREE IGE: <0.1 KU/L
ALLERGEN PIGWEED IGE: <0.1 KU/L
ALLERGEN WALNUT TREE IGE: <0.1 KU/L
BERMUDA GRASS IGE QN: <0.1 KU/L
C HERBARUM IGE QN: <0.1 KU/L
CAT DANDER IGE QN: <0.1 KU/L
CEDAR IGE QN: <0.1 KU/L
CLAM IGE QN: <0.1 KU/L
CODFISH IGE QN: <0.1 KU/L
COMMON PIGWEED CLASS: NORMAL
COMMON RAGWEED IGE QN: <0.1 KU/L
CORN IGE QN: <0.1 KU/L
COW MILK IGE QN: <0.1 KU/L
D FARINAE IGE QN: <0.1 KU/L
D PTERONYSS IGE QN: <0.1 KU/L
DEPRECATED A ALTERNATA IGE RAST QL: NORMAL
DEPRECATED A FUMIGATUS IGE RAST QL: NORMAL
DEPRECATED BERMUDA GRASS IGE RAST QL: NORMAL
DEPRECATED C HERBARUM IGE RAST QL: NORMAL
DEPRECATED CAT DANDER IGE RAST QL: NORMAL
DEPRECATED CEDAR IGE RAST QL: NORMAL
DEPRECATED CLAM IGE RAST QL: NORMAL
DEPRECATED CODFISH IGE RAST QL: NORMAL
DEPRECATED COMMON RAGWEED IGE RAST QL: NORMAL
DEPRECATED CORN IGE RAST QL: NORMAL
DEPRECATED COW MILK IGE RAST QL: NORMAL
DEPRECATED D FARINAE IGE RAST QL: NORMAL
DEPRECATED D PTERONYSS IGE RAST QL: NORMAL
DEPRECATED DOG DANDER IGE RAST QL: NORMAL
DEPRECATED EGG WHITE IGE RAST QL: NORMAL
DEPRECATED ELDER IGE RAST QL: NORMAL
DEPRECATED P NOTATUM IGE RAST QL: NORMAL
DEPRECATED PEANUT IGE RAST QL: NORMAL
DEPRECATED PECAN/HICK TREE IGE RAST QL: NORMAL
DEPRECATED ROACH IGE RAST QL: NORMAL
DEPRECATED SCALLOP IGE RAST QL: NORMAL
DEPRECATED SESAME SEED IGE RAST QL: NORMAL
DEPRECATED SHRIMP IGE RAST QL: NORMAL
DEPRECATED SILVER BIRCH IGE RAST QL: NORMAL
DEPRECATED SOYBEAN IGE RAST QL: NORMAL
DEPRECATED TIMOTHY IGE RAST QL: NORMAL
DEPRECATED WALNUT IGE RAST QL: NORMAL
DEPRECATED WHEAT IGE RAST QL: NORMAL
DEPRECATED WHITE OAK IGE RAST QL: NORMAL
DOG DANDER IGE QN: <0.1 KU/L
EGG WHITE IGE QN: <0.1 KU/L
ELDER IGE QN: <0.1 KU/L
ELM CEDAR CLASS: NORMAL
ELM CEDAR, IGE: <0.1 KU/L
P NOTATUM IGE QN: <0.1 KU/L
PEANUT IGE QN: <0.1 KU/L
PECAN/HICK TREE IGE QN: <0.1 KU/L
ROACH IGE QN: <0.1 KU/L
SCALLOP IGE QN: <0.1 KU/L
SESAME SEED IGE QN: <0.1 KU/L
SHRIMP IGE QN: <0.1 KU/L
SILVER BIRCH IGE QN: <0.1 KU/L
SOYBEAN IGE QN: <0.1 KU/L
TIMOTHY IGE QN: <0.1 KU/L
WALNUT IGE QN: <0.1 KU/L
WALNUT TREE CLASS: NORMAL
WHEAT IGE QN: <0.1 KU/L
WHITE OAK IGE QN: <0.1 KU/L

## 2024-03-09 ENCOUNTER — HOSPITAL ENCOUNTER (EMERGENCY)
Facility: HOSPITAL | Age: 79
Discharge: HOME | End: 2024-03-09
Attending: EMERGENCY MEDICINE
Payer: MEDICARE

## 2024-03-09 VITALS
BODY MASS INDEX: 23.33 KG/M2 | SYSTOLIC BLOOD PRESSURE: 170 MMHG | WEIGHT: 176 LBS | HEIGHT: 73 IN | TEMPERATURE: 98 F | DIASTOLIC BLOOD PRESSURE: 81 MMHG | OXYGEN SATURATION: 99 % | RESPIRATION RATE: 18 BRPM | HEART RATE: 69 BPM

## 2024-03-09 DIAGNOSIS — R05.9 COUGH: ICD-10-CM

## 2024-03-09 DIAGNOSIS — R05.3 CHRONIC COUGH: Primary | ICD-10-CM

## 2024-03-09 PROCEDURE — 96372 THER/PROPH/DIAG INJ SC/IM: CPT | Performed by: EMERGENCY MEDICINE

## 2024-03-09 PROCEDURE — 99284 EMERGENCY DEPT VISIT MOD MDM: CPT | Mod: 25

## 2024-03-09 PROCEDURE — 63600175 PHARM REV CODE 636 W HCPCS: Performed by: EMERGENCY MEDICINE

## 2024-03-09 RX ORDER — DEXAMETHASONE SODIUM PHOSPHATE 4 MG/ML
8 INJECTION, SOLUTION INTRA-ARTICULAR; INTRALESIONAL; INTRAMUSCULAR; INTRAVENOUS; SOFT TISSUE
Status: COMPLETED | OUTPATIENT
Start: 2024-03-09 | End: 2024-03-09

## 2024-03-09 RX ADMIN — DEXAMETHASONE SODIUM PHOSPHATE 8 MG: 4 INJECTION INTRA-ARTICULAR; INTRALESIONAL; INTRAMUSCULAR; INTRAVENOUS; SOFT TISSUE at 10:03

## 2024-03-09 NOTE — ED PROVIDER NOTES
"Encounter Date: 3/9/2024       History     Chief Complaint   Patient presents with    Cough     Pt reports cough for about two weeks now, states only comes when he's about to go to sleep, spitting up mucous.      78-year-old male with a history of hypertension, high cholesterol presents the emergency department with cough for the last 2 weeks plus worsened at night, states it has a "wheeze" cough.  Denies fever.  Has not seen his primary care physician for this.  Denies chest pain, no swelling of legs.  Denies fever      Review of patient's allergies indicates:   Allergen Reactions    Aspirin Other (See Comments)     GI intolerant     Bidil [isosorbide-hydralazine] Other (See Comments)     Tounge burning       Penicillins Rash    Praluent pen [alirocumab] Rash    Statins-hmg-coa reductase inhibitors     Zetia [ezetimibe] Other (See Comments)     Past Medical History:   Diagnosis Date    Abnormal myocardial perfusion study     Anticoagulant long-term use     Arthritis     BPH (benign prostatic hyperplasia)     Coronary artery disease     PTCA STENT RCA/CFX 2014 and CFX 2021    Digestive disorder     Diverticulitis 03/02/2022    Encounter for blood transfusion     GERD (gastroesophageal reflux disease)     Gout     H/O heart artery stent     High cholesterol     Hypercholesteremia     Hypertension     Kidney cysts     UTI (urinary tract infection)      Past Surgical History:   Procedure Laterality Date    ANTERIOR CERVICAL DISCECTOMY W/ FUSION  2007    BACK SURGERY      cardiac stents      CATARACT EXTRACTION, BILATERAL      COLONOSCOPY      COLONOSCOPY N/A 5/29/2017    Procedure: COLONOSCOPY;  Surgeon: Luis Bogran-Reyes, MD;  Location: [facility];  Service: Endoscopy;  Laterality: N/A;    CORONARY ANGIOGRAPHY N/A 2/2/2021    Procedure: ANGIOGRAM, CORONARY ARTERY;  Surgeon: James Nash MD;  Location: [facility] CATH;  Service: Cardiology;  Laterality: N/A;    DIRECT LATERAL INTERBODY FUSION (DLIF) OF SPINE N/A " 2023    Procedure: FUSION, SPINE, DLIF, PRONE LATERAL INTERBODY FUSION W/ BMP, L3-4;  Surgeon: Hunter Langston MD;  Location: [facility] OR;  Service: Orthopedics;  Laterality: N/A;  Alphatech for Prone, Nuvasive for PSF    ESOPHAGOGASTRODUODENOSCOPY      EYE SURGERY      Bilateral cataract     FACETECTOMY OF VERTEBRA Left 2022    Procedure: FACETECTOMY, PARTIAL MEDIAL;  Surgeon: Hunter Langston MD;  Location: [facility] OR;  Service: Orthopedics;  Laterality: Left;    FORAMINOTOMY Left 2022    Procedure: FORAMINOTOMY, SPINE;  Surgeon: Hunter Langston MD;  Location: [facility] OR;  Service: Orthopedics;  Laterality: Left;    FUSION OF LUMBAR SPINE USING POSTERIOR INTERBODY TECHNIQUE N/A 2023    Procedure: FUSION, SPINE, LUMBAR, PLIF, REVISION, L2-L5 W/ REPAIR NONUNION L3-4;  Surgeon: Hunter Langston MD;  Location: [facility] OR;  Service: Orthopedics;  Laterality: N/A;    LAMINOTOMY Left 2022    Procedure: LAMINOTOMY, POSTERIOR, CERVICAL, LEFT C6-C7;  Surgeon: Hunter Langston MD;  Location: [facility] OR;  Service: Orthopedics;  Laterality: Left;    LUMBAR LAMINECTOMY  2012    L2-4, Dr. Stover    PROSTATE SURGERY       Family History   Problem Relation Age of Onset    Stroke Mother     Hypertension Mother     Heart disease Mother     Cataracts Father     Heart disease Father     Thyroid disease Sister     Blindness Brother     Heart disease Brother     Stroke Sister      Social History     Tobacco Use    Smoking status: Former     Current packs/day: 0.00     Types: Cigarettes     Quit date: 1980     Years since quittin.3    Smokeless tobacco: Never   Substance Use Topics    Alcohol use: Yes     Comment: SELDOM    Drug use: No     Review of Systems   Constitutional:  Negative for fever.   HENT:  Negative for sore throat.    Respiratory:  Positive for cough. Negative for shortness of breath.    Cardiovascular:  Negative for chest pain.   Gastrointestinal:  Negative for nausea.   Genitourinary:   Negative for dysuria.   Musculoskeletal:  Negative for back pain.   Skin:  Negative for rash.   Neurological:  Negative for weakness.   Hematological:  Does not bruise/bleed easily.   All other systems reviewed and are negative.      Physical Exam     Initial Vitals   BP Pulse Resp Temp SpO2   03/09/24 0907 03/09/24 0906 03/09/24 0906 03/09/24 0906 03/09/24 0906   (!) 190/82 65 18 98.2 °F (36.8 °C) 98 %      MAP       --                Physical Exam    Nursing note and vitals reviewed.  Constitutional: He appears well-developed and well-nourished. He is not diaphoretic. No distress.   HENT:   Head: Normocephalic and atraumatic.   Eyes: Conjunctivae and EOM are normal. Pupils are equal, round, and reactive to light. Right eye exhibits no discharge. Left eye exhibits no discharge. No scleral icterus.   Neck: Neck supple. No JVD present.   Normal range of motion.  Cardiovascular:  Normal rate, regular rhythm, normal heart sounds and intact distal pulses.           No murmur heard.  Pulmonary/Chest: Breath sounds normal. No stridor. No respiratory distress. He has no wheezes. He has no rhonchi. He has no rales. He exhibits no tenderness.   Abdominal: Abdomen is soft. Bowel sounds are normal. He exhibits no distension and no mass. There is no abdominal tenderness. There is no rebound and no guarding.   Musculoskeletal:         General: No tenderness or edema. Normal range of motion.      Cervical back: Normal range of motion and neck supple.     Neurological: He is alert and oriented to person, place, and time. He has normal strength. GCS score is 15. GCS eye subscore is 4. GCS verbal subscore is 5. GCS motor subscore is 6.   Skin: Skin is warm and dry. Capillary refill takes less than 2 seconds.         ED Course   Procedures  Labs Reviewed - No data to display       Imaging Results              X-Ray Chest 1 View (In process)                      Medications   dexAMETHasone injection 8 mg (has no administration in time  range)     Medical Decision Making  Amount and/or Complexity of Data Reviewed  Radiology: ordered.    Risk  Prescription drug management.               ED Course as of 03/09/24 0959   Sat Mar 09, 2024   0958 Chest x-ray is negative for acute changes [SD]      ED Course User Index  [SD] Jim Crabtree MD               Medical Decision Making:   Differential Diagnosis:   Chronic cough, URI, postnasal drip             Clinical Impression:  Final diagnoses:  [R05.9] Cough  [R05.3] Chronic cough (Primary)          ED Disposition Condition    Discharge Stable          ED Prescriptions    None       Follow-up Information       Follow up With Specialties Details Why Contact Info Additional Information    Primary care physician  In 2 days       [facility] Emergency Department Emergency Medicine  As needed, If symptoms worsen [facility] [facility] [address] [phone] Floor 1             Jim Crabtree MD  03/09/24 0959

## 2024-04-08 PROBLEM — R05.1 ACUTE COUGH: Status: ACTIVE | Noted: 2024-04-08

## 2024-04-09 PROBLEM — J30.9 ALLERGIC RHINITIS: Status: ACTIVE | Noted: 2024-04-09

## 2024-04-27 ENCOUNTER — HOSPITAL ENCOUNTER (EMERGENCY)
Facility: HOSPITAL | Age: 79
Discharge: HOME | End: 2024-04-27
Attending: EMERGENCY MEDICINE
Payer: MEDICARE

## 2024-04-27 VITALS
WEIGHT: 174 LBS | DIASTOLIC BLOOD PRESSURE: 78 MMHG | OXYGEN SATURATION: 98 % | TEMPERATURE: 99 F | HEART RATE: 75 BPM | RESPIRATION RATE: 18 BRPM | SYSTOLIC BLOOD PRESSURE: 186 MMHG | BODY MASS INDEX: 23.06 KG/M2 | HEIGHT: 73 IN

## 2024-04-27 DIAGNOSIS — E86.0 DEHYDRATION: Primary | ICD-10-CM

## 2024-04-27 DIAGNOSIS — K59.00 CONSTIPATION, UNSPECIFIED CONSTIPATION TYPE: ICD-10-CM

## 2024-04-27 LAB
ALBUMIN SERPL BCP-MCNC: 3.3 G/DL (ref 3.5–5.2)
ALP SERPL-CCNC: 112 U/L (ref 55–135)
ALT SERPL W/O P-5'-P-CCNC: 42 U/L (ref 10–44)
ANION GAP SERPL CALC-SCNC: 6 MMOL/L (ref 3–11)
AST SERPL-CCNC: 25 U/L (ref 10–40)
BASOPHILS # BLD AUTO: 0 K/UL (ref 0–0.2)
BASOPHILS NFR BLD: 0 % (ref 0–1.9)
BILIRUB SERPL-MCNC: 0.4 MG/DL (ref 0.1–1)
BILIRUB UR QL STRIP: NEGATIVE
BUN SERPL-MCNC: 27 MG/DL (ref 8–23)
CALCIUM SERPL-MCNC: 9.2 MG/DL (ref 8.7–10.5)
CHLORIDE SERPL-SCNC: 108 MMOL/L (ref 95–110)
CLARITY UR: CLEAR
CO2 SERPL-SCNC: 27 MMOL/L (ref 23–29)
COLOR UR: YELLOW
CREAT SERPL-MCNC: 1.7 MG/DL (ref 0.5–1.4)
DIFFERENTIAL METHOD BLD: ABNORMAL
EOSINOPHIL # BLD AUTO: 0 K/UL (ref 0–0.5)
EOSINOPHIL NFR BLD: 0 % (ref 0–8)
ERYTHROCYTE [DISTWIDTH] IN BLOOD BY AUTOMATED COUNT: 14.6 % (ref 11.5–14.5)
EST. GFR  (NO RACE VARIABLE): 41 ML/MIN/1.73 M^2
GLUCOSE SERPL-MCNC: 103 MG/DL (ref 70–110)
GLUCOSE UR QL STRIP: NEGATIVE
HCT VFR BLD AUTO: 38.9 % (ref 40–54)
HGB BLD-MCNC: 13 G/DL (ref 14–18)
HGB UR QL STRIP: NEGATIVE
IMM GRANULOCYTES # BLD AUTO: 0.03 K/UL (ref 0–0.04)
IMM GRANULOCYTES NFR BLD AUTO: 0.5 % (ref 0–0.5)
KETONES UR QL STRIP: NEGATIVE
LEUKOCYTE ESTERASE UR QL STRIP: NEGATIVE
LIPASE SERPL-CCNC: 30 U/L (ref 13–75)
LYMPHOCYTES # BLD AUTO: 1.6 K/UL (ref 1–4.8)
LYMPHOCYTES NFR BLD: 24 % (ref 18–48)
MCH RBC QN AUTO: 28.7 PG (ref 27–31)
MCHC RBC AUTO-ENTMCNC: 33.4 G/DL (ref 32–36)
MCV RBC AUTO: 86 FL (ref 82–98)
MONOCYTES # BLD AUTO: 0.7 K/UL (ref 0.3–1)
MONOCYTES NFR BLD: 10.8 % (ref 4–15)
NEUTROPHILS # BLD AUTO: 4.2 K/UL (ref 1.8–7.7)
NEUTROPHILS NFR BLD: 64.7 % (ref 38–73)
NITRITE UR QL STRIP: NEGATIVE
NRBC BLD-RTO: 0 /100 WBC
NT-PROBNP SERPL-MCNC: 196 PG/ML (ref 5–1800)
PH UR STRIP: 6 [PH] (ref 5–8)
PLATELET # BLD AUTO: 148 K/UL (ref 150–450)
PMV BLD AUTO: 9.3 FL (ref 9.2–12.9)
POCT GLUCOSE: 99 MG/DL (ref 70–110)
POTASSIUM SERPL-SCNC: 4.1 MMOL/L (ref 3.5–5.1)
PROT SERPL-MCNC: 7.8 G/DL (ref 6–8.4)
PROT UR QL STRIP: NEGATIVE
RBC # BLD AUTO: 4.53 M/UL (ref 4.6–6.2)
SODIUM SERPL-SCNC: 141 MMOL/L (ref 136–145)
SP GR UR STRIP: 1.01 (ref 1–1.03)
TROPONIN I SERPL DL<=0.01 NG/ML-MCNC: 6.7 PG/ML (ref 0–60)
URN SPEC COLLECT METH UR: NORMAL
UROBILINOGEN UR STRIP-ACNC: NEGATIVE EU/DL
WBC # BLD AUTO: 6.46 K/UL (ref 3.9–12.7)

## 2024-04-27 PROCEDURE — 80053 COMPREHEN METABOLIC PANEL: CPT | Performed by: CLINICAL NURSE SPECIALIST

## 2024-04-27 PROCEDURE — 83880 ASSAY OF NATRIURETIC PEPTIDE: CPT | Performed by: CLINICAL NURSE SPECIALIST

## 2024-04-27 PROCEDURE — 25000003 PHARM REV CODE 250: Performed by: CLINICAL NURSE SPECIALIST

## 2024-04-27 PROCEDURE — 36415 COLL VENOUS BLD VENIPUNCTURE: CPT | Performed by: CLINICAL NURSE SPECIALIST

## 2024-04-27 PROCEDURE — 96374 THER/PROPH/DIAG INJ IV PUSH: CPT

## 2024-04-27 PROCEDURE — 84484 ASSAY OF TROPONIN QUANT: CPT | Performed by: CLINICAL NURSE SPECIALIST

## 2024-04-27 PROCEDURE — 85025 COMPLETE CBC W/AUTO DIFF WBC: CPT | Performed by: CLINICAL NURSE SPECIALIST

## 2024-04-27 PROCEDURE — 82962 GLUCOSE BLOOD TEST: CPT

## 2024-04-27 PROCEDURE — 63600175 PHARM REV CODE 636 W HCPCS: Performed by: CLINICAL NURSE SPECIALIST

## 2024-04-27 PROCEDURE — 99284 EMERGENCY DEPT VISIT MOD MDM: CPT | Mod: 25

## 2024-04-27 PROCEDURE — 81003 URINALYSIS AUTO W/O SCOPE: CPT | Performed by: CLINICAL NURSE SPECIALIST

## 2024-04-27 PROCEDURE — 83690 ASSAY OF LIPASE: CPT | Performed by: CLINICAL NURSE SPECIALIST

## 2024-04-27 PROCEDURE — 96361 HYDRATE IV INFUSION ADD-ON: CPT

## 2024-04-27 RX ORDER — LIDOCAINE HYDROCHLORIDE 20 MG/ML
15 SOLUTION OROPHARYNGEAL ONCE
Status: COMPLETED | OUTPATIENT
Start: 2024-04-27 | End: 2024-04-27

## 2024-04-27 RX ORDER — ALUMINUM HYDROXIDE, MAGNESIUM HYDROXIDE, AND SIMETHICONE 1200; 120; 1200 MG/30ML; MG/30ML; MG/30ML
30 SUSPENSION ORAL ONCE
Status: COMPLETED | OUTPATIENT
Start: 2024-04-27 | End: 2024-04-27

## 2024-04-27 RX ORDER — DOCUSATE SODIUM 100 MG/1
100 CAPSULE, LIQUID FILLED ORAL 2 TIMES DAILY PRN
Qty: 60 CAPSULE | Refills: 0 | Status: ON HOLD | OUTPATIENT
Start: 2024-04-27 | End: 2024-05-04 | Stop reason: HOSPADM

## 2024-04-27 RX ORDER — KETOROLAC TROMETHAMINE 30 MG/ML
15 INJECTION, SOLUTION INTRAMUSCULAR; INTRAVENOUS ONCE
Status: COMPLETED | OUTPATIENT
Start: 2024-04-27 | End: 2024-04-27

## 2024-04-27 RX ORDER — LACTULOSE 10 G/15ML
10 SOLUTION ORAL 3 TIMES DAILY
Qty: 120 ML | Refills: 0 | Status: SHIPPED | OUTPATIENT
Start: 2024-04-27 | End: 2024-05-13 | Stop reason: CLARIF

## 2024-04-27 RX ADMIN — SODIUM CHLORIDE 1000 ML: 9 INJECTION, SOLUTION INTRAVENOUS at 04:04

## 2024-04-27 RX ADMIN — ALUMINUM HYDROXIDE, MAGNESIUM HYDROXIDE, AND SIMETHICONE 30 ML: 200; 200; 20 SUSPENSION ORAL at 04:04

## 2024-04-27 RX ADMIN — LIDOCAINE HYDROCHLORIDE 15 ML: 20 SOLUTION ORAL at 04:04

## 2024-04-27 RX ADMIN — KETOROLAC TROMETHAMINE 15 MG: 30 INJECTION, SOLUTION INTRAMUSCULAR; INTRAVENOUS at 05:04

## 2024-04-27 NOTE — ED PROVIDER NOTES
Encounter Date: 4/27/2024       History     Chief Complaint   Patient presents with    Abdominal Pain     Upper abd pain/burning. Had diarrhea about 3 days ago, improved after taking OTC antidiarrheal loperamide. Reports feeling tired and continued abd pain     78-year-old male presents to the emergency room with upper abdominal pain with burning, generalized weakness, continued abdominal pain.  Patient has a history of GERD, diverticulitis        Review of patient's allergies indicates:   Allergen Reactions    Aspirin Other (See Comments)     GI intolerant     Bidil [isosorbide-hydralazine] Other (See Comments)     Tounge burning       Penicillins Rash    Praluent pen [alirocumab] Rash    Statins-hmg-coa reductase inhibitors     Zetia [ezetimibe] Other (See Comments)     Past Medical History:   Diagnosis Date    Abnormal myocardial perfusion study     Anticoagulant long-term use     Arthritis     BPH (benign prostatic hyperplasia)     Coronary artery disease     PTCA STENT RCA/CFX 2014 and CFX 2021    Digestive disorder     Diverticulitis 03/02/2022    Encounter for blood transfusion     GERD (gastroesophageal reflux disease)     Gout     H/O heart artery stent     High cholesterol     Hypercholesteremia     Hypertension     Kidney cysts     UTI (urinary tract infection)      Past Surgical History:   Procedure Laterality Date    ANTERIOR CERVICAL DISCECTOMY W/ FUSION  2007    BACK SURGERY      cardiac stents      CATARACT EXTRACTION, BILATERAL      COLONOSCOPY      COLONOSCOPY N/A 5/29/2017    Procedure: COLONOSCOPY;  Surgeon: Luis Bogran-Reyes, MD;  Location: [facility];  Service: Endoscopy;  Laterality: N/A;    CORONARY ANGIOGRAPHY N/A 2/2/2021    Procedure: ANGIOGRAM, CORONARY ARTERY;  Surgeon: James Nash MD;  Location: [facility] CATH;  Service: Cardiology;  Laterality: N/A;    DIRECT LATERAL INTERBODY FUSION (DLIF) OF SPINE N/A 2/6/2023    Procedure: FUSION, SPINE, DLIF, PRONE LATERAL INTERBODY FUSION W/  BMP, L3-4;  Surgeon: Hunter Langston MD;  Location: [facility] OR;  Service: Orthopedics;  Laterality: N/A;  Alphatech for Prone, Nuvasive for PSF    ESOPHAGOGASTRODUODENOSCOPY      EYE SURGERY      Bilateral cataract     FACETECTOMY OF VERTEBRA Left 2022    Procedure: FACETECTOMY, PARTIAL MEDIAL;  Surgeon: Hunter Langston MD;  Location: [facility] OR;  Service: Orthopedics;  Laterality: Left;    FORAMINOTOMY Left 2022    Procedure: FORAMINOTOMY, SPINE;  Surgeon: Hunter Langston MD;  Location: [facility] OR;  Service: Orthopedics;  Laterality: Left;    FUSION OF LUMBAR SPINE USING POSTERIOR INTERBODY TECHNIQUE N/A 2023    Procedure: FUSION, SPINE, LUMBAR, PLIF, REVISION, L2-L5 W/ REPAIR NONUNION L3-4;  Surgeon: Hunter Langston MD;  Location: [facility] OR;  Service: Orthopedics;  Laterality: N/A;    LAMINOTOMY Left 2022    Procedure: LAMINOTOMY, POSTERIOR, CERVICAL, LEFT C6-C7;  Surgeon: Hunter Langston MD;  Location: [facility] OR;  Service: Orthopedics;  Laterality: Left;    LUMBAR LAMINECTOMY  2012    L2-4, Dr. Stover    PROSTATE SURGERY       Family History   Problem Relation Name Age of Onset    Stroke Mother      Hypertension Mother      Heart disease Mother      Cataracts Father      Heart disease Father      Thyroid disease Sister      Blindness Brother      Heart disease Brother      Stroke Sister       Social History     Tobacco Use    Smoking status: Former     Current packs/day: 0.00     Types: Cigarettes     Quit date: 1980     Years since quittin.4    Smokeless tobacco: Never   Substance Use Topics    Alcohol use: Yes     Comment: SELDOM    Drug use: No     Review of Systems   Constitutional:  Negative for fever.   HENT:  Negative for sore throat.    Respiratory:  Negative for shortness of breath.    Cardiovascular:  Negative for chest pain.   Gastrointestinal:  Positive for abdominal pain. Negative for nausea.   Genitourinary:  Negative for dysuria.   Musculoskeletal:  Negative for back  pain.   Skin:  Negative for rash.   Neurological:  Positive for weakness.   Hematological:  Does not bruise/bleed easily.   All other systems reviewed and are negative.      Physical Exam     Initial Vitals [04/27/24 1610]   BP Pulse Resp Temp SpO2   (!) 187/76 71 18 98.7 °F (37.1 °C) 98 %      MAP       --         Physical Exam    Nursing note and vitals reviewed.  Constitutional: He appears well-developed and well-nourished.   HENT:   Head: Normocephalic and atraumatic.   Eyes: Pupils are equal, round, and reactive to light.   Neck:   Normal range of motion.  Cardiovascular:  Normal rate and regular rhythm.           Pulmonary/Chest: Breath sounds normal.   Abdominal: Abdomen is soft. Bowel sounds are normal.   Musculoskeletal:         General: Normal range of motion.      Cervical back: Normal range of motion.     Neurological: He is alert and oriented to person, place, and time.   Psychiatric: He has a normal mood and affect.         ED Course   Procedures  Labs Reviewed   CBC W/ AUTO DIFFERENTIAL - Abnormal; Notable for the following components:       Result Value    RBC 4.53 (*)     Hemoglobin 13.0 (*)     Hematocrit 38.9 (*)     RDW 14.6 (*)     Platelets 148 (*)     All other components within normal limits   COMPREHENSIVE METABOLIC PANEL - Abnormal; Notable for the following components:    BUN 27 (*)     Creatinine 1.7 (*)     Albumin 3.3 (*)     eGFR 41.0 (*)     All other components within normal limits   URINALYSIS, REFLEX TO URINE CULTURE    Narrative:     Preferred Collection Type->Urine, Clean Catch  Specimen Source->Urine   NT-PRO NATRIURETIC PEPTIDE   LIPASE   TROPONIN I HIGH SENSITIVITY   POCT GLUCOSE   POCT GLUCOSE MONITORING CONTINUOUS          Imaging Results    None          Medications   sodium chloride 0.9% bolus 1,000 mL 1,000 mL (1,000 mLs Intravenous New Bag 4/27/24 1639)   aluminum-magnesium hydroxide-simethicone 200-200-20 mg/5 mL suspension 30 mL (30 mLs Oral Given 4/27/24 1640)     And    LIDOcaine viscous HCl 2% oral solution 15 mL (15 mLs Oral Given 4/27/24 1640)     Medical Decision Making  Amount and/or Complexity of Data Reviewed  Labs: ordered.    Risk  OTC drugs.  Prescription drug management.                                      Clinical Impression:  Final diagnoses:  [E86.0] Dehydration (Primary)  [K59.00] Constipation, unspecified constipation type          ED Disposition Condition    Discharge Stable          ED Prescriptions       Medication Sig Dispense Start Date End Date Auth. Provider    docusate sodium (COLACE) 100 MG capsule Take 1 capsule (100 mg total) by mouth 2 (two) times daily as needed for Constipation. 60 capsule 4/27/2024 -- Becky Garcia NP    lactulose (CHRONULAC) 20 gram/30 mL Soln Take 15 mLs (10 g total) by mouth 3 (three) times daily. 120 mL 4/27/2024 -- Becky Garcia NP          Follow-up Information       Follow up With Specialties Details Why Contact Info    Tho Duran Jr., MD Internal Medicine  As needed [address] [place] [id number]  [phone]               Becky Garcia NP  04/27/24 5898

## 2024-05-03 ENCOUNTER — HOSPITAL ENCOUNTER (OUTPATIENT)
Facility: HOSPITAL | Age: 79
Discharge: HOME | End: 2024-05-04
Attending: EMERGENCY MEDICINE | Admitting: EMERGENCY MEDICINE
Payer: MEDICARE

## 2024-05-03 DIAGNOSIS — K92.2 GASTROINTESTINAL HEMORRHAGE, UNSPECIFIED GASTROINTESTINAL HEMORRHAGE TYPE: ICD-10-CM

## 2024-05-03 DIAGNOSIS — R10.13 EPIGASTRIC PAIN: ICD-10-CM

## 2024-05-03 DIAGNOSIS — D62 ACUTE BLOOD LOSS ANEMIA: ICD-10-CM

## 2024-05-03 DIAGNOSIS — K62.5 RECTAL BLEEDING: Primary | ICD-10-CM

## 2024-05-03 LAB
ABO + RH BLD: NORMAL
ALBUMIN SERPL BCP-MCNC: 3.1 G/DL (ref 3.5–5.2)
ALP SERPL-CCNC: 98 U/L (ref 55–135)
ALT SERPL W/O P-5'-P-CCNC: 28 U/L (ref 10–44)
ANION GAP SERPL CALC-SCNC: 9 MMOL/L (ref 3–11)
APTT PPP: 22.6 SEC (ref 21–32)
AST SERPL-CCNC: 20 U/L (ref 10–40)
BASOPHILS # BLD AUTO: 0.01 K/UL (ref 0–0.2)
BASOPHILS NFR BLD: 0.1 % (ref 0–1.9)
BILIRUB SERPL-MCNC: 0.4 MG/DL (ref 0.1–1)
BLD GP AB SCN CELLS X3 SERPL QL: NORMAL
BUN SERPL-MCNC: 36 MG/DL (ref 8–23)
CALCIUM SERPL-MCNC: 9.3 MG/DL (ref 8.7–10.5)
CHLORIDE SERPL-SCNC: 110 MMOL/L (ref 95–110)
CO2 SERPL-SCNC: 23 MMOL/L (ref 23–29)
CREAT SERPL-MCNC: 1.6 MG/DL (ref 0.5–1.4)
DIFFERENTIAL METHOD BLD: ABNORMAL
EOSINOPHIL # BLD AUTO: 0 K/UL (ref 0–0.5)
EOSINOPHIL NFR BLD: 0 % (ref 0–8)
ERYTHROCYTE [DISTWIDTH] IN BLOOD BY AUTOMATED COUNT: 14.4 % (ref 11.5–14.5)
EST. GFR  (NO RACE VARIABLE): 44.1 ML/MIN/1.73 M^2
GLUCOSE SERPL-MCNC: 119 MG/DL (ref 70–110)
HCT VFR BLD AUTO: 27.1 % (ref 40–54)
HCT VFR BLD AUTO: 31.9 % (ref 40–54)
HCT VFR BLD AUTO: 34.3 % (ref 40–54)
HGB BLD-MCNC: 10.6 G/DL (ref 14–18)
HGB BLD-MCNC: 11.4 G/DL (ref 14–18)
HGB BLD-MCNC: 9.2 G/DL (ref 14–18)
IMM GRANULOCYTES # BLD AUTO: 0.08 K/UL (ref 0–0.04)
IMM GRANULOCYTES NFR BLD AUTO: 1.2 % (ref 0–0.5)
INR PPP: 1 (ref 0.8–1.2)
LYMPHOCYTES # BLD AUTO: 2.1 K/UL (ref 1–4.8)
LYMPHOCYTES NFR BLD: 30.2 % (ref 18–48)
MCH RBC QN AUTO: 28.4 PG (ref 27–31)
MCHC RBC AUTO-ENTMCNC: 33.2 G/DL (ref 32–36)
MCV RBC AUTO: 86 FL (ref 82–98)
MONOCYTES # BLD AUTO: 0.6 K/UL (ref 0.3–1)
MONOCYTES NFR BLD: 8.4 % (ref 4–15)
NEUTROPHILS # BLD AUTO: 4.1 K/UL (ref 1.8–7.7)
NEUTROPHILS NFR BLD: 60.1 % (ref 38–73)
NRBC BLD-RTO: 0 /100 WBC
OB PNL STL: POSITIVE
PLATELET # BLD AUTO: 176 K/UL (ref 150–450)
PMV BLD AUTO: 9.8 FL (ref 9.2–12.9)
POTASSIUM SERPL-SCNC: 3.9 MMOL/L (ref 3.5–5.1)
PROT SERPL-MCNC: 6.8 G/DL (ref 6–8.4)
PROTHROMBIN TIME: 11.3 SEC (ref 9–12.5)
RBC # BLD AUTO: 4.01 M/UL (ref 4.6–6.2)
SODIUM SERPL-SCNC: 142 MMOL/L (ref 136–145)
SPECIMEN OUTDATE: NORMAL
WBC # BLD AUTO: 6.78 K/UL (ref 3.9–12.7)

## 2024-05-03 PROCEDURE — C9113 INJ PANTOPRAZOLE SODIUM, VIA: HCPCS | Performed by: EMERGENCY MEDICINE

## 2024-05-03 PROCEDURE — G0378 HOSPITAL OBSERVATION PER HR: HCPCS

## 2024-05-03 PROCEDURE — 63600175 PHARM REV CODE 636 W HCPCS: Performed by: EMERGENCY MEDICINE

## 2024-05-03 PROCEDURE — 82272 OCCULT BLD FECES 1-3 TESTS: CPT | Performed by: EMERGENCY MEDICINE

## 2024-05-03 PROCEDURE — 96376 TX/PRO/DX INJ SAME DRUG ADON: CPT | Mod: 59

## 2024-05-03 PROCEDURE — 85610 PROTHROMBIN TIME: CPT | Performed by: EMERGENCY MEDICINE

## 2024-05-03 PROCEDURE — 25500020 PHARM REV CODE 255: Performed by: SURGERY

## 2024-05-03 PROCEDURE — 80053 COMPREHEN METABOLIC PANEL: CPT | Performed by: EMERGENCY MEDICINE

## 2024-05-03 PROCEDURE — 96366 THER/PROPH/DIAG IV INF ADDON: CPT

## 2024-05-03 PROCEDURE — 86850 RBC ANTIBODY SCREEN: CPT | Performed by: EMERGENCY MEDICINE

## 2024-05-03 PROCEDURE — 25000003 PHARM REV CODE 250: Performed by: EMERGENCY MEDICINE

## 2024-05-03 PROCEDURE — 36415 COLL VENOUS BLD VENIPUNCTURE: CPT | Performed by: EMERGENCY MEDICINE

## 2024-05-03 PROCEDURE — 85014 HEMATOCRIT: CPT | Mod: 91 | Performed by: EMERGENCY MEDICINE

## 2024-05-03 PROCEDURE — 25000003 PHARM REV CODE 250: Performed by: SURGERY

## 2024-05-03 PROCEDURE — 85025 COMPLETE CBC W/AUTO DIFF WBC: CPT | Performed by: EMERGENCY MEDICINE

## 2024-05-03 PROCEDURE — 85018 HEMOGLOBIN: CPT | Mod: 91 | Performed by: EMERGENCY MEDICINE

## 2024-05-03 PROCEDURE — 96365 THER/PROPH/DIAG IV INF INIT: CPT

## 2024-05-03 PROCEDURE — 85730 THROMBOPLASTIN TIME PARTIAL: CPT | Performed by: EMERGENCY MEDICINE

## 2024-05-03 PROCEDURE — 99285 EMERGENCY DEPT VISIT HI MDM: CPT | Mod: 25

## 2024-05-03 RX ORDER — NIFEDIPINE 30 MG/1
30 TABLET, EXTENDED RELEASE ORAL DAILY
Status: DISCONTINUED | OUTPATIENT
Start: 2024-05-04 | End: 2024-05-04 | Stop reason: HOSPADM

## 2024-05-03 RX ORDER — ONDANSETRON HYDROCHLORIDE 2 MG/ML
4 INJECTION, SOLUTION INTRAVENOUS EVERY 8 HOURS PRN
Status: DISCONTINUED | OUTPATIENT
Start: 2024-05-03 | End: 2024-05-04 | Stop reason: HOSPADM

## 2024-05-03 RX ORDER — PANTOPRAZOLE SODIUM 40 MG/10ML
80 INJECTION, POWDER, LYOPHILIZED, FOR SOLUTION INTRAVENOUS
Status: COMPLETED | OUTPATIENT
Start: 2024-05-03 | End: 2024-05-03

## 2024-05-03 RX ORDER — TALC
6 POWDER (GRAM) TOPICAL NIGHTLY PRN
Status: DISCONTINUED | OUTPATIENT
Start: 2024-05-03 | End: 2024-05-04 | Stop reason: HOSPADM

## 2024-05-03 RX ORDER — SODIUM CHLORIDE 0.9 % (FLUSH) 0.9 %
10 SYRINGE (ML) INJECTION
Status: DISCONTINUED | OUTPATIENT
Start: 2024-05-03 | End: 2024-05-04 | Stop reason: HOSPADM

## 2024-05-03 RX ORDER — NIFEDIPINE 30 MG/1
30 TABLET, EXTENDED RELEASE ORAL ONCE
Status: COMPLETED | OUTPATIENT
Start: 2024-05-03 | End: 2024-05-03

## 2024-05-03 RX ORDER — SODIUM CHLORIDE 9 MG/ML
1000 INJECTION, SOLUTION INTRAVENOUS CONTINUOUS
Status: DISCONTINUED | OUTPATIENT
Start: 2024-05-03 | End: 2024-05-03

## 2024-05-03 RX ADMIN — PANTOPRAZOLE SODIUM 8 MG/HR: 40 INJECTION, POWDER, FOR SOLUTION INTRAVENOUS at 05:05

## 2024-05-03 RX ADMIN — NIFEDIPINE 30 MG: 30 TABLET, FILM COATED, EXTENDED RELEASE ORAL at 09:05

## 2024-05-03 RX ADMIN — PANTOPRAZOLE SODIUM 8 MG/HR: 40 INJECTION, POWDER, FOR SOLUTION INTRAVENOUS at 10:05

## 2024-05-03 RX ADMIN — PANTOPRAZOLE SODIUM 80 MG: 40 INJECTION, POWDER, FOR SOLUTION INTRAVENOUS at 04:05

## 2024-05-03 RX ADMIN — SODIUM CHLORIDE 1000 ML: 9 INJECTION, SOLUTION INTRAVENOUS at 03:05

## 2024-05-03 RX ADMIN — IOHEXOL 100 ML: 350 INJECTION, SOLUTION INTRAVENOUS at 04:05

## 2024-05-03 NOTE — ED PROVIDER NOTES
"Encounter Date: 5/3/2024       History     Chief Complaint   Patient presents with    Abdominal Pain     Abd pain and dark stools x1 week       78-year-old man with a history of diverticulitis, hypertension, anemia presents the ER with dark-colored stool and epigastric abdominal pain that began early this morning.  States his stool is "black".  He denies using Pepto-Bismol.  He does state that he used an enema yesterday due to constipation, and now is having very dark-colored stools.  He describes the epigastric pain is sharp.  He denies any nausea or vomiting.  He is alert oriented x4, GCS is 15.  Denies any fever      Review of patient's allergies indicates:   Allergen Reactions    Aspirin Other (See Comments)     GI intolerant     Bidil [isosorbide-hydralazine] Other (See Comments)     Tounge burning       Penicillins Rash    Praluent pen [alirocumab] Rash    Statins-hmg-coa reductase inhibitors     Zetia [ezetimibe] Other (See Comments)     Past Medical History:   Diagnosis Date    Abnormal myocardial perfusion study     Anticoagulant long-term use     Arthritis     BPH (benign prostatic hyperplasia)     Coronary artery disease     PTCA STENT RCA/CFX 2014 and CFX 2021    Digestive disorder     Diverticulitis 03/02/2022    Encounter for blood transfusion     GERD (gastroesophageal reflux disease)     Gout     H/O heart artery stent     High cholesterol     Hypercholesteremia     Hypertension     Kidney cysts     UTI (urinary tract infection)      Past Surgical History:   Procedure Laterality Date    ANTERIOR CERVICAL DISCECTOMY W/ FUSION  2007    BACK SURGERY      cardiac stents      CATARACT EXTRACTION, BILATERAL      COLONOSCOPY      COLONOSCOPY N/A 5/29/2017    Procedure: COLONOSCOPY;  Surgeon: Luis Bogran-Reyes, MD;  Location: [facility];  Service: Endoscopy;  Laterality: N/A;    CORONARY ANGIOGRAPHY N/A 2/2/2021    Procedure: ANGIOGRAM, CORONARY ARTERY;  Surgeon: James Nash MD;  Location: [facility] CATH;  " Service: Cardiology;  Laterality: N/A;    DIRECT LATERAL INTERBODY FUSION (DLIF) OF SPINE N/A 2023    Procedure: FUSION, SPINE, DLIF, PRONE LATERAL INTERBODY FUSION W/ BMP, L3-4;  Surgeon: Hunter Langston MD;  Location: [facility] OR;  Service: Orthopedics;  Laterality: N/A;  Alphatech for Prone, Nuvasive for PSF    ESOPHAGOGASTRODUODENOSCOPY      EYE SURGERY      Bilateral cataract     FACETECTOMY OF VERTEBRA Left 2022    Procedure: FACETECTOMY, PARTIAL MEDIAL;  Surgeon: Hunter Langston MD;  Location: [facility] OR;  Service: Orthopedics;  Laterality: Left;    FORAMINOTOMY Left 2022    Procedure: FORAMINOTOMY, SPINE;  Surgeon: Hunter Langston MD;  Location: [facility] OR;  Service: Orthopedics;  Laterality: Left;    FUSION OF LUMBAR SPINE USING POSTERIOR INTERBODY TECHNIQUE N/A 2023    Procedure: FUSION, SPINE, LUMBAR, PLIF, REVISION, L2-L5 W/ REPAIR NONUNION L3-4;  Surgeon: Hunter Langston MD;  Location: [facility] OR;  Service: Orthopedics;  Laterality: N/A;    LAMINOTOMY Left 2022    Procedure: LAMINOTOMY, POSTERIOR, CERVICAL, LEFT C6-C7;  Surgeon: Hunter Langston MD;  Location: [facility] OR;  Service: Orthopedics;  Laterality: Left;    LUMBAR LAMINECTOMY  2012    L2-4, Dr. Stover    PROSTATE SURGERY       Family History   Problem Relation Name Age of Onset    Stroke Mother      Hypertension Mother      Heart disease Mother      Cataracts Father      Heart disease Father      Thyroid disease Sister      Blindness Brother      Heart disease Brother      Stroke Sister       Social History     Tobacco Use    Smoking status: Former     Current packs/day: 0.00     Types: Cigarettes     Quit date: 1980     Years since quittin.4    Smokeless tobacco: Never   Substance Use Topics    Alcohol use: Yes     Comment: SELDOM    Drug use: No     Review of Systems   Constitutional:  Negative for fever.   HENT:  Negative for sore throat.    Respiratory:  Negative for shortness of breath.    Cardiovascular:   Negative for chest pain.   Gastrointestinal:  Positive for abdominal pain and blood in stool. Negative for nausea.   Genitourinary:  Negative for dysuria.   Musculoskeletal:  Negative for back pain.   Skin:  Negative for rash.   Neurological:  Negative for weakness.   Hematological:  Does not bruise/bleed easily.   All other systems reviewed and are negative.      Physical Exam     Initial Vitals   BP Pulse Resp Temp SpO2   05/03/24 1513 05/03/24 1513 05/03/24 1513 05/03/24 1513 05/03/24 1548   128/83 83 (!) 22 98.3 °F (36.8 °C) 96 %      MAP       --                Physical Exam    Nursing note and vitals reviewed.  Constitutional: He appears well-developed and well-nourished. He is not diaphoretic. No distress.   HENT:   Head: Normocephalic and atraumatic.   Eyes: Conjunctivae and EOM are normal. Pupils are equal, round, and reactive to light. Right eye exhibits no discharge. Left eye exhibits no discharge. No scleral icterus.   Neck: Neck supple. No JVD present.   Normal range of motion.  Cardiovascular:  Normal rate, regular rhythm, normal heart sounds and intact distal pulses.           No murmur heard.  Pulmonary/Chest: Breath sounds normal. No stridor. No respiratory distress. He has no wheezes. He has no rhonchi. He has no rales. He exhibits no tenderness.   Abdominal: Abdomen is soft. Bowel sounds are normal. He exhibits no distension and no mass. There is abdominal tenderness.   Mild tenderness noted to epigastric area, nontender to lower abdomen There is no rebound and no guarding.   Genitourinary:    Genitourinary Comments: Rectal exam performed with nurse present (Jud) - dark in color, appears to be dark maroon     Musculoskeletal:         General: No tenderness or edema. Normal range of motion.      Cervical back: Normal range of motion and neck supple.     Neurological: He is alert and oriented to person, place, and time. He has normal strength. GCS score is 15. GCS eye subscore is 4. GCS verbal  subscore is 5. GCS motor subscore is 6.   Skin: Skin is warm and dry. Capillary refill takes less than 2 seconds.         ED Course   Critical Care    Date/Time: 5/3/2024 4:51 PM    Performed by: Jim Crabtree MD  Authorized by: Luma Lora MD  Direct patient critical care time: 10 minutes  Additional history critical care time: 10 minutes  Ordering / reviewing critical care time: 10 minutes  Documentation critical care time: 10 minutes  Consulting other physicians critical care time: 10 minutes  Consult with family critical care time: 10 minutes  Total critical care time (exclusive of procedural time) : 60 minutes  Critical care was necessary to treat or prevent imminent or life-threatening deterioration of the following conditions: GI bleed requiring IV Protonix drip and monitoring.  Critical care was time spent personally by me on the following activities: review of old charts, re-evaluation of patient's condition, pulse oximetry, ordering and review of radiographic studies, ordering and review of laboratory studies, ordering and performing treatments and interventions, obtaining history from patient or surrogate, examination of patient, evaluation of patient's response to treatment, discussions with primary provider and development of treatment plan with patient or surrogate.        Labs Reviewed   OCCULT BLOOD X 1, STOOL - Abnormal; Notable for the following components:       Result Value    Occult Blood Positive (*)     All other components within normal limits   CBC W/ AUTO DIFFERENTIAL - Abnormal; Notable for the following components:    RBC 4.01 (*)     Hemoglobin 11.4 (*)     Hematocrit 34.3 (*)     Immature Granulocytes 1.2 (*)     Immature Grans (Abs) 0.08 (*)     All other components within normal limits   COMPREHENSIVE METABOLIC PANEL - Abnormal; Notable for the following components:    Glucose 119 (*)     BUN 36 (*)     Creatinine 1.6 (*)     Albumin 3.1 (*)     eGFR 44.1 (*)     All  other components within normal limits   APTT   PROTIME-INR   TYPE & SCREEN          Imaging Results              CTA Acute GI [place], Abdomen and Pelvis (In process)                      Medications   0.9%  NaCl infusion (1,000 mLs Intravenous New Bag 5/3/24 1541)   pantoprazole (PROTONIX) 40 mg in sodium chloride 0.9 % 100 mL IVPB (MB+) (has no administration in time range)   pantoprazole injection 80 mg (80 mg Intravenous Given 5/3/24 1631)     Medical Decision Making  Amount and/or Complexity of Data Reviewed  Labs: ordered.  Radiology: ordered.    Risk  Prescription drug management.               ED Course as of 05/03/24 1652   Fri May 03, 2024   1647 Discussed case with  - will place in observation for serial H&Hs, Protonix drip, and continued monitoring.  Awaiting results of CT scan [SD]      ED Course User Index  [SD] Jim Crabtree MD               Medical Decision Making:   Differential Diagnosis:   GI bleed, blood in stool, pancreatitis             Clinical Impression:  Final diagnoses:  [K92.2] Gastrointestinal hemorrhage, unspecified gastrointestinal hemorrhage type (Primary)          ED Disposition Condition    Observation Stable                Jim Crabtree MD  05/03/24 1652

## 2024-05-03 NOTE — PLAN OF CARE
Plan of care reviewed and ongoing. Patient AAO x4, denies any pain. Orientated patient to room and call bell, call bell is in reach.

## 2024-05-04 VITALS
HEART RATE: 88 BPM | OXYGEN SATURATION: 98 % | BODY MASS INDEX: 24.22 KG/M2 | TEMPERATURE: 98 F | RESPIRATION RATE: 20 BRPM | HEIGHT: 73 IN | DIASTOLIC BLOOD PRESSURE: 76 MMHG | WEIGHT: 182.75 LBS | SYSTOLIC BLOOD PRESSURE: 134 MMHG

## 2024-05-04 PROBLEM — K62.5 RECTAL BLEEDING: Status: ACTIVE | Noted: 2024-05-04

## 2024-05-04 PROBLEM — R10.13 EPIGASTRIC PAIN: Status: ACTIVE | Noted: 2024-05-04

## 2024-05-04 PROBLEM — D62 ACUTE BLOOD LOSS ANEMIA: Status: ACTIVE | Noted: 2024-05-04

## 2024-05-04 LAB
HCT VFR BLD AUTO: 27.2 % (ref 40–54)
HCT VFR BLD AUTO: 28 % (ref 40–54)
HCT VFR BLD AUTO: 28.2 % (ref 40–54)
HGB BLD-MCNC: 9.3 G/DL (ref 14–18)
HGB BLD-MCNC: 9.3 G/DL (ref 14–18)
HGB BLD-MCNC: 9.5 G/DL (ref 14–18)

## 2024-05-04 PROCEDURE — 96361 HYDRATE IV INFUSION ADD-ON: CPT

## 2024-05-04 PROCEDURE — 96366 THER/PROPH/DIAG IV INF ADDON: CPT

## 2024-05-04 PROCEDURE — 85014 HEMATOCRIT: CPT | Performed by: EMERGENCY MEDICINE

## 2024-05-04 PROCEDURE — 85014 HEMATOCRIT: CPT | Mod: 91 | Performed by: SURGERY

## 2024-05-04 PROCEDURE — 25000003 PHARM REV CODE 250: Performed by: SURGERY

## 2024-05-04 PROCEDURE — 63600175 PHARM REV CODE 636 W HCPCS: Performed by: EMERGENCY MEDICINE

## 2024-05-04 PROCEDURE — 85018 HEMOGLOBIN: CPT | Mod: 91 | Performed by: EMERGENCY MEDICINE

## 2024-05-04 PROCEDURE — 36415 COLL VENOUS BLD VENIPUNCTURE: CPT | Performed by: EMERGENCY MEDICINE

## 2024-05-04 PROCEDURE — 36415 COLL VENOUS BLD VENIPUNCTURE: CPT | Mod: XB | Performed by: SURGERY

## 2024-05-04 PROCEDURE — 25000003 PHARM REV CODE 250: Performed by: EMERGENCY MEDICINE

## 2024-05-04 PROCEDURE — 85018 HEMOGLOBIN: CPT | Mod: 91 | Performed by: SURGERY

## 2024-05-04 PROCEDURE — G0378 HOSPITAL OBSERVATION PER HR: HCPCS

## 2024-05-04 PROCEDURE — C9113 INJ PANTOPRAZOLE SODIUM, VIA: HCPCS | Performed by: EMERGENCY MEDICINE

## 2024-05-04 RX ORDER — LANOLIN ALCOHOL/MO/W.PET/CERES
1 CREAM (GRAM) TOPICAL 2 TIMES DAILY
Status: DISCONTINUED | OUTPATIENT
Start: 2024-05-04 | End: 2024-05-04 | Stop reason: HOSPADM

## 2024-05-04 RX ORDER — SUCRALFATE 1 G/1
1 TABLET ORAL
Qty: 120 TABLET | Refills: 0 | Status: SHIPPED | OUTPATIENT
Start: 2024-05-04

## 2024-05-04 RX ORDER — SUCRALFATE 1 G/1
1 TABLET ORAL
Status: DISCONTINUED | OUTPATIENT
Start: 2024-05-04 | End: 2024-05-04 | Stop reason: HOSPADM

## 2024-05-04 RX ORDER — DOCUSATE SODIUM 100 MG/1
100 CAPSULE, LIQUID FILLED ORAL 2 TIMES DAILY
Status: DISCONTINUED | OUTPATIENT
Start: 2024-05-04 | End: 2024-05-04 | Stop reason: HOSPADM

## 2024-05-04 RX ORDER — METOPROLOL TARTRATE 100 MG/1
100 TABLET ORAL 2 TIMES DAILY
Status: DISCONTINUED | OUTPATIENT
Start: 2024-05-04 | End: 2024-05-04 | Stop reason: HOSPADM

## 2024-05-04 RX ORDER — PANTOPRAZOLE SODIUM 40 MG/1
40 TABLET, DELAYED RELEASE ORAL 2 TIMES DAILY
Qty: 180 TABLET | Refills: 3 | Status: SHIPPED | OUTPATIENT
Start: 2024-05-04 | End: 2025-05-04

## 2024-05-04 RX ORDER — PANTOPRAZOLE SODIUM 40 MG/1
40 TABLET, DELAYED RELEASE ORAL 2 TIMES DAILY
Status: DISCONTINUED | OUTPATIENT
Start: 2024-05-04 | End: 2024-05-04 | Stop reason: HOSPADM

## 2024-05-04 RX ORDER — PREDNISONE 20 MG/1
20 TABLET ORAL 2 TIMES DAILY
Status: DISCONTINUED | OUTPATIENT
Start: 2024-05-04 | End: 2024-05-04 | Stop reason: HOSPADM

## 2024-05-04 RX ORDER — FERROUS SULFATE 325(65) MG
325 TABLET, DELAYED RELEASE (ENTERIC COATED) ORAL 2 TIMES DAILY
Qty: 60 TABLET | Refills: 2 | Status: SHIPPED | OUTPATIENT
Start: 2024-05-04

## 2024-05-04 RX ORDER — DOCUSATE SODIUM 100 MG/1
100 CAPSULE, LIQUID FILLED ORAL 2 TIMES DAILY
Qty: 60 CAPSULE | Refills: 3 | Status: SHIPPED | OUTPATIENT
Start: 2024-05-04

## 2024-05-04 RX ADMIN — PANTOPRAZOLE SODIUM 8 MG/HR: 40 INJECTION, POWDER, FOR SOLUTION INTRAVENOUS at 09:05

## 2024-05-04 RX ADMIN — SUCRALFATE 1 G: 1 TABLET ORAL at 04:05

## 2024-05-04 RX ADMIN — THERA TABS 1 TABLET: TAB at 03:05

## 2024-05-04 RX ADMIN — NIFEDIPINE 30 MG: 30 TABLET, FILM COATED, EXTENDED RELEASE ORAL at 08:05

## 2024-05-04 RX ADMIN — PANTOPRAZOLE SODIUM 8 MG/HR: 40 INJECTION, POWDER, FOR SOLUTION INTRAVENOUS at 01:05

## 2024-05-04 NOTE — H&P (VIEW-ONLY)
[facility]  General Surgery  History & Physical  5/4/24    Patient Name: James Baltazar  MRN: [id number]  Admission Date: 5/3/2024  Attending Physician: Luma Lora MD   Primary Care Provider: Tho Duran Jr., MD    Patient information was obtained from patient, spouse/SO, and ER records.     Subjective:     Chief Complaint/Reason for Admission: anemia, rectal bleeding    History of Present Illness: Reports about week history of dark stools.  Had diarrhea then given meds and constipation then enema with bleeding.  Some epigastric pain.  Upper and lower endoscopr quite a few years ago.  No nausea or vomiting.  Some indigestion.      Current Facility-Administered Medications   Medication Dose Route Frequency Provider Last Rate Last Admin    melatonin tablet 6 mg  6 mg Oral Nightly PRN Jim Crabtree MD        NIFEdipine 24 hr tablet 30 mg  30 mg Oral Daily Jim Crabtree MD   30 mg at 05/04/24 0844    ondansetron injection 4 mg  4 mg Intravenous Q8H PRN Jim Crabtree MD        pantoprazole (PROTONIX) 40 mg in sodium chloride 0.9 % 100 mL IVPB (MB+)  8 mg/hr Intravenous Continuous Jim Crabtree MD 20 mL/hr at 05/04/24 0921 8 mg/hr at 05/04/24 0921    sodium chloride 0.9% flush 10 mL  10 mL Intravenous PRN Jim Crabtree MD           Review of patient's allergies indicates:   Allergen Reactions    Aspirin Other (See Comments)     GI intolerant     Bidil [isosorbide-hydralazine] Other (See Comments)     Tounge burning       Penicillins Rash    Praluent pen [alirocumab] Rash    Statins-hmg-coa reductase inhibitors     Zetia [ezetimibe] Other (See Comments)       Past Medical History:   Diagnosis Date    Abnormal myocardial perfusion study     Anticoagulant long-term use     Arthritis     BPH (benign prostatic hyperplasia)     Coronary artery disease     PTCA STENT RCA/CFX 2014 and CFX 2021    Digestive disorder     Diverticulitis 03/02/2022    Encounter for blood transfusion      GERD (gastroesophageal reflux disease)     Gout     H/O heart artery stent     High cholesterol     Hypercholesteremia     Hypertension     Kidney cysts     UTI (urinary tract infection)      Past Surgical History:   Procedure Laterality Date    ANTERIOR CERVICAL DISCECTOMY W/ FUSION  2007    BACK SURGERY      cardiac stents      CATARACT EXTRACTION, BILATERAL      COLONOSCOPY      COLONOSCOPY N/A 5/29/2017    Procedure: COLONOSCOPY;  Surgeon: Luis Bogran-Reyes, MD;  Location: [facility];  Service: Endoscopy;  Laterality: N/A;    CORONARY ANGIOGRAPHY N/A 2/2/2021    Procedure: ANGIOGRAM, CORONARY ARTERY;  Surgeon: James Nash MD;  Location: [facility] CATH;  Service: Cardiology;  Laterality: N/A;    DIRECT LATERAL INTERBODY FUSION (DLIF) OF SPINE N/A 2/6/2023    Procedure: FUSION, SPINE, DLIF, PRONE LATERAL INTERBODY FUSION W/ BMP, L3-4;  Surgeon: Hunter Langston MD;  Location: [facility] OR;  Service: Orthopedics;  Laterality: N/A;  Alphatech for Prone, Nuvasive for PSF    ESOPHAGOGASTRODUODENOSCOPY      EYE SURGERY      Bilateral cataract     FACETECTOMY OF VERTEBRA Left 8/26/2022    Procedure: FACETECTOMY, PARTIAL MEDIAL;  Surgeon: Hunter Langston MD;  Location: [facility] OR;  Service: Orthopedics;  Laterality: Left;    FORAMINOTOMY Left 8/26/2022    Procedure: FORAMINOTOMY, SPINE;  Surgeon: Hunter Langston MD;  Location: [facility] OR;  Service: Orthopedics;  Laterality: Left;    FUSION OF LUMBAR SPINE USING POSTERIOR INTERBODY TECHNIQUE N/A 2/6/2023    Procedure: FUSION, SPINE, LUMBAR, PLIF, REVISION, L2-L5 W/ REPAIR NONUNION L3-4;  Surgeon: Hunter Langston MD;  Location: [facility] OR;  Service: Orthopedics;  Laterality: N/A;    LAMINOTOMY Left 8/26/2022    Procedure: LAMINOTOMY, POSTERIOR, CERVICAL, LEFT C6-C7;  Surgeon: Hunter Langston MD;  Location: [facility] OR;  Service: Orthopedics;  Laterality: Left;    LUMBAR LAMINECTOMY  12/14/2012    L2-4, Dr. Stover    PROSTATE SURGERY       Family History       Problem Relation (Age  of Onset)    Blindness Brother    Cataracts Father    Heart disease Mother, Father, Brother    Hypertension Mother    Stroke Mother, Sister    Thyroid disease Sister          Tobacco Use    Smoking status: Former     Current packs/day: 0.00     Types: Cigarettes     Quit date: 1980     Years since quittin.4    Smokeless tobacco: Never   Substance and Sexual Activity    Alcohol use: Yes     Comment: SELDOM    Drug use: No    Sexual activity: Never     Review of Systems   Constitutional:  Positive for activity change and fatigue. Negative for appetite change.   Respiratory:  Negative for choking, chest tightness and shortness of breath.    Cardiovascular:  Negative for chest pain, palpitations and leg swelling.   Gastrointestinal:  Positive for abdominal pain, blood in stool, constipation, diarrhea and nausea. Negative for abdominal distention and vomiting.   Endocrine: Negative for cold intolerance, heat intolerance and polyuria.   Genitourinary:  Negative for difficulty urinating, dysuria and urgency.   Musculoskeletal:  Positive for gait problem.   Neurological:  Positive for dizziness, weakness and light-headedness.   Hematological:  Negative for adenopathy. Does not bruise/bleed easily.   Psychiatric/Behavioral:  Negative for confusion and decreased concentration.    All other systems reviewed and are negative.    Objective:     Vital Signs (Most Recent):  Temp: 98.5 °F (36.9 °C) (24 1022)  Pulse: 74 (24 1156)  Resp: 20 (24 1022)  BP: 128/63 (24 1022)  SpO2: 98 % (24 1022) Vital Signs (24h Range):  Temp:  [97.7 °F (36.5 °C)-98.5 °F (36.9 °C)] 98.5 °F (36.9 °C)  Pulse:  [59-83] 74  Resp:  [18-22] 20  SpO2:  [96 %-100 %] 98 %  BP: (128-175)/(63-88) 128/63     Weight: 82.9 kg (182 lb 12.2 oz)  Body mass index is 24.11 kg/m².     Physical Exam  Vitals and nursing note reviewed.   Constitutional:       General: He is not in acute distress.     Appearance: Normal appearance. He  is not ill-appearing.   HENT:      Head: Normocephalic and atraumatic.      Nose: Nose normal.      Mouth/Throat:      Mouth: Mucous membranes are moist.      Pharynx: Oropharynx is clear.   Eyes:      General: No scleral icterus.     Extraocular Movements: Extraocular movements intact.      Conjunctiva/sclera: Conjunctivae normal.      Pupils: Pupils are equal, round, and reactive to light.   Cardiovascular:      Rate and Rhythm: Normal rate and regular rhythm.      Pulses: Normal pulses.      Heart sounds: Normal heart sounds.   Pulmonary:      Effort: Pulmonary effort is normal. No respiratory distress.      Breath sounds: Normal breath sounds. No wheezing or rales.   Abdominal:      General: Bowel sounds are normal. There is no distension.      Palpations: Abdomen is soft.      Tenderness: There is abdominal tenderness (mild epigastric). There is no guarding.   Musculoskeletal:         General: No swelling, deformity or signs of injury. Normal range of motion.      Cervical back: Normal range of motion and neck supple.   Skin:     General: Skin is warm and dry.      Coloration: Skin is not jaundiced.   Neurological:      Mental Status: He is alert and oriented to person, place, and time. Mental status is at baseline.      Motor: Weakness present.   Psychiatric:         Mood and Affect: Mood normal.         Behavior: Behavior normal.         Thought Content: Thought content normal.         Judgment: Judgment normal.            I have reviewed all pertinent lab results within the past 24 hours.  CBC:   Recent Labs   Lab 05/03/24  1536 05/03/24  1952 05/04/24  0924   WBC 6.78  --   --    RBC 4.01*  --   --    HGB 11.4*   < > 9.3*   HCT 34.3*   < > 28.0*     --   --    MCV 86  --   --    MCH 28.4  --   --    MCHC 33.2  --   --     < > = values in this interval not displayed.     BMP:   Recent Labs   Lab 05/03/24  1536   *      K 3.9      CO2 23   BUN 36*   CREATININE 1.6*   CALCIUM 9.3      CMP:   Recent Labs   Lab 05/03/24  1536   *   CALCIUM 9.3   ALBUMIN 3.1*   PROT 6.8      K 3.9   CO2 23      BUN 36*   CREATININE 1.6*   ALKPHOS 98   ALT 28   AST 20   BILITOT 0.4       Significant Diagnostics:  I have reviewed all pertinent imaging results/findings within the past 24 hours.    Assessment/Plan:     Epigastric pain  Question upper abnormality for bleeding   PPI at high dose and Carafate      Acute blood loss anemia  Hold Plavix  Monitor H/H  Iron replacement    Rectal bleeding  Hold Plavix  EGD and colonoscopy in 7-10 days if H/H remains stable.  No new BM since admit  CT no acute bleed      VTE Risk Mitigation (From admission, onward)           Ordered     IP VTE HIGH RISK PATIENT  Once         05/03/24 1737     Place sequential compression device  Until discontinued         05/03/24 1737     Place PRIETO hose  Until discontinued         05/03/24 1737                    Luma Lora MD  General Surgery  [facility]

## 2024-05-04 NOTE — H&P
[facility]  General Surgery  History & Physical  5/4/24    Patient Name: James Baltazar  MRN: [id number]  Admission Date: 5/3/2024  Attending Physician: Luma Lora MD   Primary Care Provider: Tho Duran Jr., MD    Patient information was obtained from patient, spouse/SO, and ER records.     Subjective:     Chief Complaint/Reason for Admission: anemia, rectal bleeding    History of Present Illness: Reports about week history of dark stools.  Had diarrhea then given meds and constipation then enema with bleeding.  Some epigastric pain.  Upper and lower endoscopr quite a few years ago.  No nausea or vomiting.  Some indigestion.      Current Facility-Administered Medications   Medication Dose Route Frequency Provider Last Rate Last Admin    melatonin tablet 6 mg  6 mg Oral Nightly PRN Jim Crabtree MD        NIFEdipine 24 hr tablet 30 mg  30 mg Oral Daily Jim Crabtree MD   30 mg at 05/04/24 0844    ondansetron injection 4 mg  4 mg Intravenous Q8H PRN Jim Crabtree MD        pantoprazole (PROTONIX) 40 mg in sodium chloride 0.9 % 100 mL IVPB (MB+)  8 mg/hr Intravenous Continuous Jim Crabtree MD 20 mL/hr at 05/04/24 0921 8 mg/hr at 05/04/24 0921    sodium chloride 0.9% flush 10 mL  10 mL Intravenous PRN Jim Crabtree MD           Review of patient's allergies indicates:   Allergen Reactions    Aspirin Other (See Comments)     GI intolerant     Bidil [isosorbide-hydralazine] Other (See Comments)     Tounge burning       Penicillins Rash    Praluent pen [alirocumab] Rash    Statins-hmg-coa reductase inhibitors     Zetia [ezetimibe] Other (See Comments)       Past Medical History:   Diagnosis Date    Abnormal myocardial perfusion study     Anticoagulant long-term use     Arthritis     BPH (benign prostatic hyperplasia)     Coronary artery disease     PTCA STENT RCA/CFX 2014 and CFX 2021    Digestive disorder     Diverticulitis 03/02/2022    Encounter for blood transfusion      GERD (gastroesophageal reflux disease)     Gout     H/O heart artery stent     High cholesterol     Hypercholesteremia     Hypertension     Kidney cysts     UTI (urinary tract infection)      Past Surgical History:   Procedure Laterality Date    ANTERIOR CERVICAL DISCECTOMY W/ FUSION  2007    BACK SURGERY      cardiac stents      CATARACT EXTRACTION, BILATERAL      COLONOSCOPY      COLONOSCOPY N/A 5/29/2017    Procedure: COLONOSCOPY;  Surgeon: Luis Bogran-Reyes, MD;  Location: [facility];  Service: Endoscopy;  Laterality: N/A;    CORONARY ANGIOGRAPHY N/A 2/2/2021    Procedure: ANGIOGRAM, CORONARY ARTERY;  Surgeon: James Nash MD;  Location: [facility] CATH;  Service: Cardiology;  Laterality: N/A;    DIRECT LATERAL INTERBODY FUSION (DLIF) OF SPINE N/A 2/6/2023    Procedure: FUSION, SPINE, DLIF, PRONE LATERAL INTERBODY FUSION W/ BMP, L3-4;  Surgeon: Hunter Langston MD;  Location: [facility] OR;  Service: Orthopedics;  Laterality: N/A;  Alphatech for Prone, Nuvasive for PSF    ESOPHAGOGASTRODUODENOSCOPY      EYE SURGERY      Bilateral cataract     FACETECTOMY OF VERTEBRA Left 8/26/2022    Procedure: FACETECTOMY, PARTIAL MEDIAL;  Surgeon: Hunter Langston MD;  Location: [facility] OR;  Service: Orthopedics;  Laterality: Left;    FORAMINOTOMY Left 8/26/2022    Procedure: FORAMINOTOMY, SPINE;  Surgeon: Hunter Langston MD;  Location: [facility] OR;  Service: Orthopedics;  Laterality: Left;    FUSION OF LUMBAR SPINE USING POSTERIOR INTERBODY TECHNIQUE N/A 2/6/2023    Procedure: FUSION, SPINE, LUMBAR, PLIF, REVISION, L2-L5 W/ REPAIR NONUNION L3-4;  Surgeon: Hunter Langston MD;  Location: [facility] OR;  Service: Orthopedics;  Laterality: N/A;    LAMINOTOMY Left 8/26/2022    Procedure: LAMINOTOMY, POSTERIOR, CERVICAL, LEFT C6-C7;  Surgeon: Hunter Langston MD;  Location: [facility] OR;  Service: Orthopedics;  Laterality: Left;    LUMBAR LAMINECTOMY  12/14/2012    L2-4, Dr. Stover    PROSTATE SURGERY       Family History       Problem Relation (Age  of Onset)    Blindness Brother    Cataracts Father    Heart disease Mother, Father, Brother    Hypertension Mother    Stroke Mother, Sister    Thyroid disease Sister          Tobacco Use    Smoking status: Former     Current packs/day: 0.00     Types: Cigarettes     Quit date: 1980     Years since quittin.4    Smokeless tobacco: Never   Substance and Sexual Activity    Alcohol use: Yes     Comment: SELDOM    Drug use: No    Sexual activity: Never     Review of Systems   Constitutional:  Positive for activity change and fatigue. Negative for appetite change.   Respiratory:  Negative for choking, chest tightness and shortness of breath.    Cardiovascular:  Negative for chest pain, palpitations and leg swelling.   Gastrointestinal:  Positive for abdominal pain, blood in stool, constipation, diarrhea and nausea. Negative for abdominal distention and vomiting.   Endocrine: Negative for cold intolerance, heat intolerance and polyuria.   Genitourinary:  Negative for difficulty urinating, dysuria and urgency.   Musculoskeletal:  Positive for gait problem.   Neurological:  Positive for dizziness, weakness and light-headedness.   Hematological:  Negative for adenopathy. Does not bruise/bleed easily.   Psychiatric/Behavioral:  Negative for confusion and decreased concentration.    All other systems reviewed and are negative.    Objective:     Vital Signs (Most Recent):  Temp: 98.5 °F (36.9 °C) (24 1022)  Pulse: 74 (24 1156)  Resp: 20 (24 1022)  BP: 128/63 (24 1022)  SpO2: 98 % (24 1022) Vital Signs (24h Range):  Temp:  [97.7 °F (36.5 °C)-98.5 °F (36.9 °C)] 98.5 °F (36.9 °C)  Pulse:  [59-83] 74  Resp:  [18-22] 20  SpO2:  [96 %-100 %] 98 %  BP: (128-175)/(63-88) 128/63     Weight: 82.9 kg (182 lb 12.2 oz)  Body mass index is 24.11 kg/m².     Physical Exam  Vitals and nursing note reviewed.   Constitutional:       General: He is not in acute distress.     Appearance: Normal appearance. He  is not ill-appearing.   HENT:      Head: Normocephalic and atraumatic.      Nose: Nose normal.      Mouth/Throat:      Mouth: Mucous membranes are moist.      Pharynx: Oropharynx is clear.   Eyes:      General: No scleral icterus.     Extraocular Movements: Extraocular movements intact.      Conjunctiva/sclera: Conjunctivae normal.      Pupils: Pupils are equal, round, and reactive to light.   Cardiovascular:      Rate and Rhythm: Normal rate and regular rhythm.      Pulses: Normal pulses.      Heart sounds: Normal heart sounds.   Pulmonary:      Effort: Pulmonary effort is normal. No respiratory distress.      Breath sounds: Normal breath sounds. No wheezing or rales.   Abdominal:      General: Bowel sounds are normal. There is no distension.      Palpations: Abdomen is soft.      Tenderness: There is abdominal tenderness (mild epigastric). There is no guarding.   Musculoskeletal:         General: No swelling, deformity or signs of injury. Normal range of motion.      Cervical back: Normal range of motion and neck supple.   Skin:     General: Skin is warm and dry.      Coloration: Skin is not jaundiced.   Neurological:      Mental Status: He is alert and oriented to person, place, and time. Mental status is at baseline.      Motor: Weakness present.   Psychiatric:         Mood and Affect: Mood normal.         Behavior: Behavior normal.         Thought Content: Thought content normal.         Judgment: Judgment normal.            I have reviewed all pertinent lab results within the past 24 hours.  CBC:   Recent Labs   Lab 05/03/24  1536 05/03/24  1952 05/04/24  0924   WBC 6.78  --   --    RBC 4.01*  --   --    HGB 11.4*   < > 9.3*   HCT 34.3*   < > 28.0*     --   --    MCV 86  --   --    MCH 28.4  --   --    MCHC 33.2  --   --     < > = values in this interval not displayed.     BMP:   Recent Labs   Lab 05/03/24  1536   *      K 3.9      CO2 23   BUN 36*   CREATININE 1.6*   CALCIUM 9.3      CMP:   Recent Labs   Lab 05/03/24  1536   *   CALCIUM 9.3   ALBUMIN 3.1*   PROT 6.8      K 3.9   CO2 23      BUN 36*   CREATININE 1.6*   ALKPHOS 98   ALT 28   AST 20   BILITOT 0.4       Significant Diagnostics:  I have reviewed all pertinent imaging results/findings within the past 24 hours.    Assessment/Plan:     Epigastric pain  Question upper abnormality for bleeding   PPI at high dose and Carafate      Acute blood loss anemia  Hold Plavix  Monitor H/H  Iron replacement    Rectal bleeding  Hold Plavix  EGD and colonoscopy in 7-10 days if H/H remains stable.  No new BM since admit  CT no acute bleed      VTE Risk Mitigation (From admission, onward)           Ordered     IP VTE HIGH RISK PATIENT  Once         05/03/24 1737     Place sequential compression device  Until discontinued         05/03/24 1737     Place PRIETO hose  Until discontinued         05/03/24 1737                    Luma Lora MD  General Surgery  [facility]

## 2024-05-04 NOTE — PLAN OF CARE
[facility]  Initial Discharge Assessment       Primary Care Provider: Tho Duran Jr., MD    Admission Diagnosis: Gastrointestinal hemorrhage, unspecified gastrointestinal hemorrhage type [K92.2]    Admission Date: 5/3/2024  Expected Discharge Date:          Payor: [employer] [facility] / Plan: [employer] / Product Type: Medicare Advantage /     Extended Emergency Contact Information  Primary Emergency Contact: Sandi Baltazar   [facility]  Mobile Phone: [phone]  Relation: Spouse    Discharge Plan A: Home  Discharge Plan B: Home      [id number] - [place], [place] - [address]  [address]  [facility] [address]  Phone: [phone] Fax: [phone]      Initial Assessment (most recent)       Adult Discharge Assessment - 05/04/24 1235          Discharge Assessment    Confirmed/corrected address, phone number and insurance Yes     Confirmed Demographics Correct on Facesheet     Source of Information patient     When was your last doctors appointment? 02/06/24     Does patient/caregiver understand observation status Yes     Communicated ANNE with patient/caregiver Yes     Reason For Admission abdominal pain and bloody stools     People in Home significant other;child(montana), adult     Facility Arrived From: home, drove self     Do you expect to return to your current living situation? Yes     Do you have help at home or someone to help you manage your care at home? No     Prior to hospitilization cognitive status: Alert/Oriented     Current cognitive status: Alert/Oriented     Walking or Climbing Stairs Difficulty no     Dressing/Bathing Difficulty no     Home Accessibility not wheelchair accessible     Home Layout Able to live on 1st floor     Equipment Currently Used at Home none     Readmission within 30 days? No     Patient currently being followed by outpatient case management? No     How do you get to doctors appointments? car, drives self     Are you on  dialysis? No     Do you take coumadin? No     Discharge Plan A Home     Discharge Plan B Home     DME Needed Upon Discharge  none                   Initial discharge assessment completed. At this time, the patient does not require any assistance from case management. Attempted to offer the patient resources, but he politely declined. CM/SW will remain available. Contact information was left in the patient's room.

## 2024-05-04 NOTE — SUBJECTIVE & OBJECTIVE
Current Facility-Administered Medications   Medication Dose Route Frequency Provider Last Rate Last Admin    melatonin tablet 6 mg  6 mg Oral Nightly PRN Jim Crabtree MD        NIFEdipine 24 hr tablet 30 mg  30 mg Oral Daily Jim Crabtree MD   30 mg at 05/04/24 0844    ondansetron injection 4 mg  4 mg Intravenous Q8H PRN Jim Crabtree MD        pantoprazole (PROTONIX) 40 mg in sodium chloride 0.9 % 100 mL IVPB (MB+)  8 mg/hr Intravenous Continuous Jim Crabtree MD 20 mL/hr at 05/04/24 0921 8 mg/hr at 05/04/24 0921    sodium chloride 0.9% flush 10 mL  10 mL Intravenous PRN Jim Crabtree MD           Review of patient's allergies indicates:   Allergen Reactions    Aspirin Other (See Comments)     GI intolerant     Bidil [isosorbide-hydralazine] Other (See Comments)     Tounge burning       Penicillins Rash    Praluent pen [alirocumab] Rash    Statins-hmg-coa reductase inhibitors     Zetia [ezetimibe] Other (See Comments)       Past Medical History:   Diagnosis Date    Abnormal myocardial perfusion study     Anticoagulant long-term use     Arthritis     BPH (benign prostatic hyperplasia)     Coronary artery disease     PTCA STENT RCA/CFX 2014 and CFX 2021    Digestive disorder     Diverticulitis 03/02/2022    Encounter for blood transfusion     GERD (gastroesophageal reflux disease)     Gout     H/O heart artery stent     High cholesterol     Hypercholesteremia     Hypertension     Kidney cysts     UTI (urinary tract infection)      Past Surgical History:   Procedure Laterality Date    ANTERIOR CERVICAL DISCECTOMY W/ FUSION  2007    BACK SURGERY      cardiac stents      CATARACT EXTRACTION, BILATERAL      COLONOSCOPY      COLONOSCOPY N/A 5/29/2017    Procedure: COLONOSCOPY;  Surgeon: Luis Bogran-Reyes, MD;  Location: [facility];  Service: Endoscopy;  Laterality: N/A;    CORONARY ANGIOGRAPHY N/A 2/2/2021    Procedure: ANGIOGRAM, CORONARY ARTERY;  Surgeon: James Nash MD;  Location: [facility]  CATH;  Service: Cardiology;  Laterality: N/A;    DIRECT LATERAL INTERBODY FUSION (DLIF) OF SPINE N/A 2023    Procedure: FUSION, SPINE, DLIF, PRONE LATERAL INTERBODY FUSION W/ BMP, L3-4;  Surgeon: Hunter Langston MD;  Location: [facility] OR;  Service: Orthopedics;  Laterality: N/A;  Alphatech for Prone, Nuvasive for PSF    ESOPHAGOGASTRODUODENOSCOPY      EYE SURGERY      Bilateral cataract     FACETECTOMY OF VERTEBRA Left 2022    Procedure: FACETECTOMY, PARTIAL MEDIAL;  Surgeon: Hunter Langston MD;  Location: [facility] OR;  Service: Orthopedics;  Laterality: Left;    FORAMINOTOMY Left 2022    Procedure: FORAMINOTOMY, SPINE;  Surgeon: Hunter Langston MD;  Location: [facility] OR;  Service: Orthopedics;  Laterality: Left;    FUSION OF LUMBAR SPINE USING POSTERIOR INTERBODY TECHNIQUE N/A 2023    Procedure: FUSION, SPINE, LUMBAR, PLIF, REVISION, L2-L5 W/ REPAIR NONUNION L3-4;  Surgeon: Hunter Langston MD;  Location: [facility] OR;  Service: Orthopedics;  Laterality: N/A;    LAMINOTOMY Left 2022    Procedure: LAMINOTOMY, POSTERIOR, CERVICAL, LEFT C6-C7;  Surgeon: Hunter Langston MD;  Location: [facility] OR;  Service: Orthopedics;  Laterality: Left;    LUMBAR LAMINECTOMY  2012    L2-4, Dr. Stover    PROSTATE SURGERY       Family History       Problem Relation (Age of Onset)    Blindness Brother    Cataracts Father    Heart disease Mother, Father, Brother    Hypertension Mother    Stroke Mother, Sister    Thyroid disease Sister          Tobacco Use    Smoking status: Former     Current packs/day: 0.00     Types: Cigarettes     Quit date: 1980     Years since quittin.4    Smokeless tobacco: Never   Substance and Sexual Activity    Alcohol use: Yes     Comment: SELDOM    Drug use: No    Sexual activity: Never     Review of Systems   Constitutional:  Positive for activity change and fatigue. Negative for appetite change.   Respiratory:  Negative for choking, chest tightness and shortness of breath.     Cardiovascular:  Negative for chest pain, palpitations and leg swelling.   Gastrointestinal:  Positive for abdominal pain, blood in stool, constipation, diarrhea and nausea. Negative for abdominal distention and vomiting.   Endocrine: Negative for cold intolerance, heat intolerance and polyuria.   Genitourinary:  Negative for difficulty urinating, dysuria and urgency.   Musculoskeletal:  Positive for gait problem.   Neurological:  Positive for dizziness, weakness and light-headedness.   Hematological:  Negative for adenopathy. Does not bruise/bleed easily.   Psychiatric/Behavioral:  Negative for confusion and decreased concentration.    All other systems reviewed and are negative.    Objective:     Vital Signs (Most Recent):  Temp: 98.5 °F (36.9 °C) (05/04/24 1022)  Pulse: 74 (05/04/24 1156)  Resp: 20 (05/04/24 1022)  BP: 128/63 (05/04/24 1022)  SpO2: 98 % (05/04/24 1022) Vital Signs (24h Range):  Temp:  [97.7 °F (36.5 °C)-98.5 °F (36.9 °C)] 98.5 °F (36.9 °C)  Pulse:  [59-83] 74  Resp:  [18-22] 20  SpO2:  [96 %-100 %] 98 %  BP: (128-175)/(63-88) 128/63     Weight: 82.9 kg (182 lb 12.2 oz)  Body mass index is 24.11 kg/m².     Physical Exam  Vitals and nursing note reviewed.   Constitutional:       General: He is not in acute distress.     Appearance: Normal appearance. He is not ill-appearing.   HENT:      Head: Normocephalic and atraumatic.      Nose: Nose normal.      Mouth/Throat:      Mouth: Mucous membranes are moist.      Pharynx: Oropharynx is clear.   Eyes:      General: No scleral icterus.     Extraocular Movements: Extraocular movements intact.      Conjunctiva/sclera: Conjunctivae normal.      Pupils: Pupils are equal, round, and reactive to light.   Cardiovascular:      Rate and Rhythm: Normal rate and regular rhythm.      Pulses: Normal pulses.      Heart sounds: Normal heart sounds.   Pulmonary:      Effort: Pulmonary effort is normal. No respiratory distress.      Breath sounds: Normal breath sounds.  No wheezing or rales.   Abdominal:      General: Bowel sounds are normal. There is no distension.      Palpations: Abdomen is soft.      Tenderness: There is abdominal tenderness (mild epigastric). There is no guarding.   Musculoskeletal:         General: No swelling, deformity or signs of injury. Normal range of motion.      Cervical back: Normal range of motion and neck supple.   Skin:     General: Skin is warm and dry.      Coloration: Skin is not jaundiced.   Neurological:      Mental Status: He is alert and oriented to person, place, and time. Mental status is at baseline.      Motor: Weakness present.   Psychiatric:         Mood and Affect: Mood normal.         Behavior: Behavior normal.         Thought Content: Thought content normal.         Judgment: Judgment normal.            I have reviewed all pertinent lab results within the past 24 hours.  CBC:   Recent Labs   Lab 05/03/24  1536 05/03/24 1952 05/04/24  0924   WBC 6.78  --   --    RBC 4.01*  --   --    HGB 11.4*   < > 9.3*   HCT 34.3*   < > 28.0*     --   --    MCV 86  --   --    MCH 28.4  --   --    MCHC 33.2  --   --     < > = values in this interval not displayed.     BMP:   Recent Labs   Lab 05/03/24  1536   *      K 3.9      CO2 23   BUN 36*   CREATININE 1.6*   CALCIUM 9.3     CMP:   Recent Labs   Lab 05/03/24  1536   *   CALCIUM 9.3   ALBUMIN 3.1*   PROT 6.8      K 3.9   CO2 23      BUN 36*   CREATININE 1.6*   ALKPHOS 98   ALT 28   AST 20   BILITOT 0.4       Significant Diagnostics:  I have reviewed all pertinent imaging results/findings within the past 24 hours.

## 2024-05-04 NOTE — ASSESSMENT & PLAN NOTE
Hold Plavix  EGD and colonoscopy in 7-10 days if H/H remains stable.  No new BM since admit  CT no acute bleed

## 2024-05-04 NOTE — HPI
Reports about week history of dark stools.  Had diarrhea then given meds and constipation then enema with bleeding.  Some epigastric pain.  Upper and lower endoscopr quite a few years ago.  No nausea or vomiting.  Some indigestion.

## 2024-05-04 NOTE — PLAN OF CARE
05/04/24 1234   JOHNSON Message   Medicare Outpatient and Observation Notification regarding financial responsibility Given to patient/caregiver;Explained to patient/caregiver;Signed/date by patient/caregiver   Date JOHNSON was signed 05/04/24   Time JOHNSON was signed 1229

## 2024-05-04 NOTE — NURSING
"Upon intial rounds patient was concerned about not taking BP medication today and plan of care in regards to blood in his stool. MD Lora ordered one time does of Nifedipine. Plan of care discussed with patient as well as wife and son at the bedside. Questions and concerns answered.     2145 Pt had a bowel movement causing him concerns and anxiety. BM was loose/liquid with black tarry stool as well as loose bright red blood, pt felt "winded" from walking to BR and back to bed. Pt stated he wanted to leave and go to another hospital, therapeutic communication and education provided. Pt was able to calm down, talked with wife at bedside and decided to stay. BSC placed in room, educated pt to use call light when he feels he needs to get up to have another BM. Questions and concerns addressed, call light and personal items within reach of patient.     2215 Pt had another BM using the BSC with stand by assistance of nurse. Characteristics of BM unchanged from 2145 BM, pt remained calm and thanked nurse for help. Daughter of the pt did call via spouse's phone and discussed plan of care with pt, wife at bedside, and nurse. All in agreement with plan of care. Call light and personal items within reach of patient.      "

## 2024-05-04 NOTE — DISCHARGE SUMMARY
[facility] Surg  Discharge Note  Short Stay    * No surgery found *      OUTCOME: Condition has improved and patient is now ready for discharge.    DISPOSITION: Home or Self Care    FINAL DIAGNOSIS:  Rectal bleeding    FOLLOWUP: In clinic    DISCHARGE INSTRUCTIONS:    Discharge Procedure Orders   Diet diabetic   Order Comments: Avoid seeds, nuts, corn or black pepper     Notify your health care provider if you experience any of the following:  temperature >100.4     Notify your health care provider if you experience any of the following:  persistent nausea and vomiting or diarrhea     Notify your health care provider if you experience any of the following:  severe uncontrolled pain     Notify your health care provider if you experience any of the following:  difficulty breathing or increased cough     Notify your health care provider if you experience any of the following:  persistent dizziness, light-headedness, or visual disturbances     Notify your health care provider if you experience any of the following:  increased confusion or weakness     Activity as tolerated        TIME SPENT ON DISCHARGE: 20 minutes

## 2024-05-04 NOTE — PLAN OF CARE
Plan of care reviewed and ongoing with patient and spouse at the bedside. 22 gauge IV to L hand infusing Protonix at 20 mL/hr, 20 gauge IV to R FA saline locked, room air tolerating well, tele electrodes intact, urinal and BSC at the bedside, uses BSC with stand by assistance. Experienced black tarry loose stools during the night. Blankets and Love Hugger applied to patient due to complaints of being cold and shivering, full relief noted. Call light and personal items within reach of patient.     Problem: Adult Inpatient Plan of Care  Goal: Plan of Care Review  Outcome: Progressing  Goal: Absence of Hospital-Acquired Illness or Injury  Outcome: Progressing  Goal: Optimal Comfort and Wellbeing  Outcome: Progressing     Problem: Adult Inpatient Plan of Care  Goal: Patient-Specific Goal (Individualized)  Outcome: Not Progressing  Goal: Readiness for Transition of Care  Outcome: Not Progressing

## 2024-05-09 DIAGNOSIS — D62 ACUTE BLOOD LOSS ANEMIA: Primary | ICD-10-CM

## 2024-05-09 DIAGNOSIS — R10.13 EPIGASTRIC PAIN: ICD-10-CM

## 2024-05-09 DIAGNOSIS — K62.5 RECTAL BLEEDING: ICD-10-CM

## 2024-05-09 DIAGNOSIS — D64.9 ANEMIA: ICD-10-CM

## 2024-05-09 RX ORDER — SODIUM CHLORIDE 0.9 % (FLUSH) 0.9 %
10 SYRINGE (ML) INJECTION
Status: CANCELLED | OUTPATIENT
Start: 2024-05-09

## 2024-05-09 RX ORDER — SODIUM CHLORIDE 9 MG/ML
INJECTION, SOLUTION INTRAVENOUS CONTINUOUS
Status: CANCELLED | OUTPATIENT
Start: 2024-05-09

## 2024-05-13 ENCOUNTER — HOSPITAL ENCOUNTER (OUTPATIENT)
Dept: PREADMISSION TESTING | Facility: HOSPITAL | Age: 79
Discharge: HOME | End: 2024-05-13
Attending: SURGERY
Payer: MEDICARE

## 2024-05-13 ENCOUNTER — HOSPITAL ENCOUNTER (OUTPATIENT)
Dept: PULMONOLOGY | Facility: HOSPITAL | Age: 79
Discharge: HOME | End: 2024-05-13
Attending: ANESTHESIOLOGY
Payer: MEDICARE

## 2024-05-13 ENCOUNTER — ANESTHESIA EVENT (OUTPATIENT)
Dept: ENDOSCOPY | Facility: HOSPITAL | Age: 79
End: 2024-05-13
Payer: MEDICARE

## 2024-05-13 VITALS — BODY MASS INDEX: 23.72 KG/M2 | WEIGHT: 179 LBS | HEIGHT: 73 IN

## 2024-05-13 DIAGNOSIS — Z01.818 PREOP TESTING: ICD-10-CM

## 2024-05-13 DIAGNOSIS — Z01.818 PREOP TESTING: Primary | ICD-10-CM

## 2024-05-13 LAB
OHS QRS DURATION: 86 MS
OHS QTC CALCULATION: 394 MS

## 2024-05-13 PROCEDURE — 93005 ELECTROCARDIOGRAM TRACING: CPT

## 2024-05-13 PROCEDURE — 93010 ELECTROCARDIOGRAM REPORT: CPT | Mod: ,,, | Performed by: INTERNAL MEDICINE

## 2024-05-13 RX ORDER — CLOPIDOGREL BISULFATE 300 MG/1
75 TABLET, FILM COATED ORAL ONCE
COMMUNITY

## 2024-05-13 NOTE — ANESTHESIA PREPROCEDURE EVALUATION
05/13/2024  James Baltazar is a 78 y.o., male.      Pre-op Assessment    I have reviewed the Patient Summary Reports.    I have reviewed the NPO Status.   I have reviewed the Medications.     Review of Systems  Anesthesia Hx:  No problems with previous Anesthesia             Denies Family Hx of Anesthesia complications.    Denies Personal Hx of Anesthesia complications.                    Social:  Former Smoker       Cardiovascular:     Hypertension, well controlled   CAD  asymptomatic CABG/stent           ECG has been reviewed. CIS CLEARED                         Pulmonary:  Pulmonary Normal                       Renal/:  Chronic Renal Disease   cysts             Hepatic/GI:     GERD, poorly controlled             Musculoskeletal:  Arthritis               Neurological:  Neurology Normal                                      Endocrine:  Diabetes, well controlled, type 2             Lab Results   Component Value Date    WBC 6.95 05/13/2024    HGB 9.8 (L) 05/13/2024    HCT 29.2 (L) 05/13/2024    MCV 86 05/13/2024     05/13/2024      CMP  Sodium   Date Value Ref Range Status   05/13/2024 138 136 - 145 mmol/L Final     Potassium   Date Value Ref Range Status   05/13/2024 3.6 3.5 - 5.1 mmol/L Final     Chloride   Date Value Ref Range Status   05/13/2024 106 95 - 110 mmol/L Final     CO2   Date Value Ref Range Status   05/13/2024 25 23 - 29 mmol/L Final     Glucose   Date Value Ref Range Status   05/13/2024 105 70 - 110 mg/dL Final     BUN   Date Value Ref Range Status   05/13/2024 23 8 - 23 mg/dL Final     Creatinine   Date Value Ref Range Status   05/13/2024 1.9 (H) 0.5 - 1.4 mg/dL Final     Calcium   Date Value Ref Range Status   05/13/2024 9.6 8.7 - 10.5 mg/dL Final     Total Protein   Date Value Ref Range Status   05/13/2024 7.2 6.0 - 8.4 g/dL Final     Albumin   Date Value Ref Range Status    05/13/2024 3.3 (L) 3.5 - 5.2 g/dL Final     Total Bilirubin   Date Value Ref Range Status   05/13/2024 0.3 0.1 - 1.0 mg/dL Final     Comment:     For infants and newborns, interpretation of results should be based  on gestational age, weight and in agreement with clinical  observations.    Premature Infant recommended reference ranges:  Up to 24 hours.............<8.0 mg/dL  Up to 48 hours............<12.0 mg/dL  3-5 days..................<15.0 mg/dL  6-29 days.................<15.0 mg/dL    For patients on Eltrombopag therapy, use of Dimension [place] TBIL is   not   recommended.       Alkaline Phosphatase   Date Value Ref Range Status   05/13/2024 107 55 - 135 U/L Final     AST   Date Value Ref Range Status   05/13/2024 15 10 - 40 U/L Final     ALT   Date Value Ref Range Status   05/13/2024 19 10 - 44 U/L Final     Anion Gap   Date Value Ref Range Status   05/13/2024 7 3 - 11 mmol/L Final     eGFR   Date Value Ref Range Status   05/13/2024 35.9 (A) >60 mL/min/1.73 m^2 Final   05/25/2023 46 (L) > OR = 60 mL/min/1.73m2 Final     Comment:     The eGFR is based on the CKD-EPI 2021 equation. To calculate   the new eGFR from a previous Creatinine or Cystatin C  result, go to https://www.kidney.org/professionals/  kdoqi/gfr%5Fcalculator          Physical Exam  General: Well nourished    Airway:  Mallampati: III / II  Mouth Opening: Normal  TM Distance: Normal  Tongue: Normal  Neck ROM: Normal ROM    Dental:  Dentures, Partial Dentures    Chest/Lungs:  Clear to auscultation    Heart:  Rate: Normal  Rhythm: Regular Rhythm  Sounds: Normal        Anesthesia Plan  Type of Anesthesia, risks & benefits discussed:    Anesthesia Type: MAC  Intra-op Monitoring Plan: Standard ASA Monitors  Post Op Pain Control Plan: multimodal analgesia  Induction:  IV  Airway Plan: Direct  Informed Consent: Informed consent signed with the Patient and all parties understand the risks and agree with anesthesia plan.  All questions answered.   ASA  Score: 4  Day of Surgery Review of History & Physical: I have interviewed and examined the patient. I have reviewed the patient's H&P dated: There are no significant changes.     Ready For Surgery From Anesthesia Perspective.     .

## 2024-05-14 ENCOUNTER — HOSPITAL ENCOUNTER (OUTPATIENT)
Facility: HOSPITAL | Age: 79
Discharge: HOME | End: 2024-05-14
Attending: SURGERY | Admitting: SURGERY
Payer: MEDICARE

## 2024-05-14 ENCOUNTER — ANESTHESIA (OUTPATIENT)
Dept: ENDOSCOPY | Facility: HOSPITAL | Age: 79
End: 2024-05-14
Payer: MEDICARE

## 2024-05-14 VITALS
OXYGEN SATURATION: 96 % | SYSTOLIC BLOOD PRESSURE: 118 MMHG | HEART RATE: 52 BPM | DIASTOLIC BLOOD PRESSURE: 62 MMHG | RESPIRATION RATE: 18 BRPM | TEMPERATURE: 98 F

## 2024-05-14 DIAGNOSIS — K63.5 POLYP OF COLON, UNSPECIFIED PART OF COLON, UNSPECIFIED TYPE: ICD-10-CM

## 2024-05-14 DIAGNOSIS — K21.00 GASTROESOPHAGEAL REFLUX DISEASE WITH ESOPHAGITIS WITHOUT HEMORRHAGE: ICD-10-CM

## 2024-05-14 DIAGNOSIS — K62.5 RECTAL BLEEDING: ICD-10-CM

## 2024-05-14 DIAGNOSIS — K57.30 DIVERTICULOSIS LARGE INTESTINE W/O PERFORATION OR ABSCESS W/O BLEEDING: ICD-10-CM

## 2024-05-14 DIAGNOSIS — K29.50 ANTRAL GASTRITIS: ICD-10-CM

## 2024-05-14 DIAGNOSIS — R10.13 EPIGASTRIC PAIN: ICD-10-CM

## 2024-05-14 DIAGNOSIS — K26.9 DUODENAL ULCER: ICD-10-CM

## 2024-05-14 DIAGNOSIS — D64.9 ANEMIA: Primary | ICD-10-CM

## 2024-05-14 PROBLEM — K44.9 HIATAL HERNIA WITHOUT GANGRENE OR OBSTRUCTION: Status: ACTIVE | Noted: 2024-05-14

## 2024-05-14 PROCEDURE — 27201423 OPTIME MED/SURG SUP & DEVICES STERILE SUPPLY: Performed by: SURGERY

## 2024-05-14 PROCEDURE — 43239 EGD BIOPSY SINGLE/MULTIPLE: CPT | Performed by: SURGERY

## 2024-05-14 PROCEDURE — 37000008 HC ANESTHESIA 1ST 15 MINUTES: Performed by: SURGERY

## 2024-05-14 PROCEDURE — 25000003 PHARM REV CODE 250: Performed by: ANESTHESIOLOGY

## 2024-05-14 PROCEDURE — 45385 COLONOSCOPY W/LESION REMOVAL: CPT | Performed by: SURGERY

## 2024-05-14 PROCEDURE — 25000003 PHARM REV CODE 250: Performed by: SURGERY

## 2024-05-14 PROCEDURE — 37000009 HC ANESTHESIA EA ADD 15 MINS: Performed by: SURGERY

## 2024-05-14 RX ORDER — SODIUM CHLORIDE 9 MG/ML
INJECTION, SOLUTION INTRAVENOUS CONTINUOUS PRN
Status: DISCONTINUED | OUTPATIENT
Start: 2024-05-14 | End: 2024-05-14

## 2024-05-14 RX ORDER — PROPOFOL 10 MG/ML
VIAL (ML) INTRAVENOUS
Status: DISCONTINUED | OUTPATIENT
Start: 2024-05-14 | End: 2024-05-14

## 2024-05-14 RX ORDER — SODIUM CHLORIDE 0.9 % (FLUSH) 0.9 %
10 SYRINGE (ML) INJECTION
Status: DISCONTINUED | OUTPATIENT
Start: 2024-05-14 | End: 2024-05-14 | Stop reason: HOSPADM

## 2024-05-14 RX ORDER — SODIUM CHLORIDE 9 MG/ML
INJECTION, SOLUTION INTRAVENOUS CONTINUOUS
Status: DISCONTINUED | OUTPATIENT
Start: 2024-05-14 | End: 2024-05-14 | Stop reason: HOSPADM

## 2024-05-14 RX ORDER — ONDANSETRON HYDROCHLORIDE 2 MG/ML
4 INJECTION, SOLUTION INTRAVENOUS DAILY PRN
Status: DISCONTINUED | OUTPATIENT
Start: 2024-05-14 | End: 2024-05-14 | Stop reason: HOSPADM

## 2024-05-14 RX ADMIN — Medication 40 MG: at 12:05

## 2024-05-14 RX ADMIN — Medication 80 MG: at 11:05

## 2024-05-14 RX ADMIN — SODIUM CHLORIDE: 9 INJECTION, SOLUTION INTRAVENOUS at 08:05

## 2024-05-14 RX ADMIN — Medication 50 MG: at 12:05

## 2024-05-14 RX ADMIN — Medication 30 MG: at 11:05

## 2024-05-14 RX ADMIN — Medication 20 MG: at 11:05

## 2024-05-14 RX ADMIN — SODIUM CHLORIDE: 0.9 INJECTION, SOLUTION INTRAVENOUS at 11:05

## 2024-05-14 NOTE — DISCHARGE SUMMARY
Discharge Summary  General Surgery      Admit Date: 5/14/2024    Discharge Date :5/14/2024    Attending Physician: Luma Lora     Discharge Physician: Luma Lora    Discharged Condition: good    Discharge Diagnosis: Acute blood loss anemia [D62]    Treatments/Procedures: Procedure(s) (LRB):  EGD, WITH CLOSED BIOPSY (N/A)  COLONOSCOPY, WITH POLYPECTOMY USING HOT BIOPSY FORCEPS AND SNARE (N/A)    Hospital Course: Uneventful; Discharged home from Recovery    Significant Diagnostic Studies: none    Disposition: Home or Self Care    Diet:  Low residue.  Avoid spicy, acidic or tomato based foods    Follow up: Office 10-14 days    Activity: No restrictions.    Patient Instructions:   Current Discharge Medication List        CONTINUE these medications which have NOT CHANGED    Details   azelastine (ASTELIN) 137 mcg (0.1 %) nasal spray Prescribed by Dr. Garcia      chlorthalidone (HYGROTEN) 25 MG Tab Take 25 mg by mouth once daily.      docusate sodium (COLACE) 100 MG capsule Take 1 capsule (100 mg total) by mouth 2 (two) times daily.  Qty: 60 capsule, Refills: 3      ezetimibe (ZETIA) 10 mg tablet Take 1 tablet (10 mg total) by mouth once daily.  Qty: 90 tablet, Refills: 1      ferrous sulfate 325 (65 FE) MG EC tablet Take 1 tablet (325 mg total) by mouth 2 (two) times daily.  Qty: 60 tablet, Refills: 2      nebivoloL (BYSTOLIC) 20 mg Tab Take 1 tablet by mouth once daily.      NIFEdipine (ADALAT CC) 60 MG TbSR Take 60 mg by mouth once daily.      olmesartan (BENICAR) 40 MG tablet Take 40 mg by mouth once daily.    Comments: .      pantoprazole (PROTONIX) 40 MG tablet Take 1 tablet (40 mg total) by mouth 2 (two) times daily.  Qty: 180 tablet, Refills: 3      rosuvastatin (CRESTOR) 40 MG Tab TAKE 1 TABLET BY MOUTH DAILY  Qty: 30 tablet, Refills: 5      sucralfate (CARAFATE) 1 gram tablet Take 1 tablet (1 g total) by mouth 4 (four) times daily before meals and nightly.  Qty: 120 tablet, Refills: 0      clopidogreL  (PLAVIX) 300 mg Tab Take 75 mg by mouth once. Stopped 05/03/2024             Discharge Procedure Orders   Diet general   Order Comments: Low residue.  Avoid spicy, acidic or tomato based foods     Call MD for:  temperature >100.4     Call MD for:  persistent nausea and vomiting     Call MD for:  difficulty breathing, headache or visual disturbances     Call MD for:  persistent dizziness or light-headedness     Call MD for:  extreme fatigue     Call MD for:  severe uncontrolled pain     Activity as tolerated

## 2024-05-14 NOTE — DISCHARGE INSTRUCTIONS
Call your doctor for any questions or concerns.    Return to office for follow up appointment as scheduled with Dr. Lora.    Diet: Low Residue (see attached)    Resume home medications.    Do not drive or drink any alcohol for the next 24 hours.

## 2024-05-14 NOTE — TRANSFER OF CARE
Anesthesia Transfer of Care Note    Patient: James Baltazar    Procedure(s) Performed: Procedure(s) (LRB):  EGD, WITH CLOSED BIOPSY (N/A)  COLONOSCOPY, WITH POLYPECTOMY USING HOT BIOPSY FORCEPS AND SNARE (N/A)    Patient location: PACU    Anesthesia Type: general    Transport from OR: Transported from OR on room air with adequate spontaneous ventilation    Post pain: adequate analgesia    Post assessment: no apparent anesthetic complications    Post vital signs: stable    Level of consciousness: sedated    Nausea/Vomiting: no nausea/vomiting    Complications: none    Transfer of care protocol was followed      Last vitals:     /61  P 68  R 20  O2 Sat 98%

## 2024-05-14 NOTE — PLAN OF CARE
Pt returned to room from Recovery.  Wife present at bedside.  Pt awake and alert.  IV saline locked.  VS taken.  Snack offered.   No complaints voiced.

## 2024-05-14 NOTE — OP NOTE
[facility] - Endoscopy  EGD Procedure  Operative Note    SUMMARY     Date of Procedure: 5/14/2024     Procedure: Procedure(s) (LRB):  EGD, WITH CLOSED BIOPSY (N/A)  COLONOSCOPY, WITH POLYPECTOMY USING HOT BIOPSY FORCEPS AND SNARE (N/A)    Surgeons and Role:     * Luma Lora MD - Primary    Assisting Surgeon: None    Patient location: GI    Pre-Operative Diagnosis: Acute blood loss anemia [D62]    Post-Operative Diagnosis: Post-Op Diagnosis Codes:     * Acute blood loss anemia [D62]  Duodenal ulcer   Hiatal hernia   Reflux esophagitis   Diverticulosis   Colon polyps     Indications:  Melena with severe anemia requiring admission approximately 10 days ago          Procedure:                  The patient was brought in to the endoscopy suite where the risks, benefits, and alternatives of the procedure were described.  The patient was given the opportunity to ask questions and then signed informed consent. Patient was positioned in the left lateral decubitus position, continuous monitoring was initiated, and supplemental oxygen was provided via nasal cannula.  Bite block was placed.  Adequate sedation was achieved with the above mentioned medications and then titrated during the entire procedure.  Under direct visualization the gastroscope was introduced through the oropharynx in to the esophagus.  The scope was then advanced in to the stomach and second portion of the duodenum.  Scope was then withdrawn and the mucosa was carefully examined.  The entire gastric mucosa was examined, including the fundus with retroflexion.  Air was evacuated from the stomach and the scope was withdrawn in to the esophagus.  The entire esophageal mucosa was examined.  The patient tolerated the procedure well and was able to be transferred to the recovery area in stable condition.    Findings:                 Esophagus:  Reflux changes in the esophagus but felt to be related to sliding hiatal hernia.  Small dilated esophageal  veins/varices so biopsies not take                     Stomach:  Minimal irritation within the antrum.  Biopsies taken for histopathology with the cold forceps.  Retroflexion revealed no significant hiatal hernia.                    Duodenum:  Ulcer noted in the bulb just distal to the pyloric channel.  Covered with exudate.  No active bleeding.  Multiple biopsies taken with the cold forceps for histopathology.    Procedure:                  The patient was brought in to the endoscopy suite where the risks, benefits, and alternatives of the procedure were described.  The patient was given the opportunity to ask questions and then signed informed consent.  Patient was positioned in the left lateral decubitus position, continuous monitoring was initiated, and supplemental oxygen was provided via nasal cannula.  Adequate sedation was achieved with the above mentioned medications and then titrated during the entire procedure.  Digital rectal exam was performed.  Under direct visualization the colonoscope was introduced through the anus in to the rectum.  The scope was then advanced to the cecum, which was identified by the ileocecal valve and appendiceal orifice.  Scope was then withdrawn and the mucosa was carefully examined in a circular fashion.  The entire colonic mucosa was examined.  Air was evacuated from the colon and the procedure was terminated.  The patient tolerated the procedure well and was able to be transferred to the recovery area in stable condition.    Findings:                 Digital rectal examination:  No palpable abnormalities                     Rectum:  No significant hemorrhoidal disease                    Sigmoid:  Diffuse diverticulosis        Descending:  Polyp at 60 cm taken entirely with a hot snare.  This was removed by suctioning on the end of the scope until it suctioned through the scope and  into multiple pieces.         Transverse:  Pedunculated polyp at 75 cm taken  entirely with a hot snare and retrieved in a suction retrieval trap         Ascending:  No mucosal abnormality        Cecum:  Small sessile polyp on the ileocecal valve just in the orifice that was taken entirely with the hot forceps.  Appendiceal orifice and ileocecal valve appreciated as noted above        Terminal Ileum:  No obvious mucosal abnormalities other than the polyp at the orifice     Specimens:   Specimen (24h ago, onward)       Start     Ordered    05/14/24 1229  Specimen to Pathology, Surgery Gastrointestinal tract  Once        Comments: Pre-op Diagnosis: Acute blood loss anemia [D62]Procedure(s):EGD, WITH CLOSED BIOPSYCOLONOSCOPY Number of specimens: 5Name of specimens: 1) Biopsy duodenum @1156                                  2) Biopsy antreum @1157                                  3) Colon polyp ileocecal valve @1218                                  4) Colon polyp 75cm @1220                                  5) Colon polyp 60cm @1226     References:    Click here for ordering Quick Tip   Question Answer Comment   Procedure Type: Gastrointestinal tract    Specimen Class: Routine/Screening    Release to patient Immediate        05/14/24 1229                      Estimated Blood Loss (EBL): * No values recorded between 5/14/2024 11:42 AM and 5/14/2024 12:39 PM *     Complications: No     Diagnostic Impression:  Reflux esophagitis, hiatal hernia, duodenal ulcer, diverticulosis, colon polyps     Recommendations: Discharge patient to home. Patient has a contact number available for emergencies. The signs and symptoms of potential delayed complications were discussed with the patient. Return to normal activities tomorrow. Written discharge instructions were provided to the patient. Resume previous diet. Continue present medications. Await pathology results.    Disposition:  Recovery Unit stable     Attestation: I performed the procedure.        Follow Up:             Future Appointments   Date Time  Provider Department Center   6/26/2024  9:00 AM LAB, [facility]           Luma Lora MD  5/14/2024

## 2024-05-14 NOTE — ANESTHESIA POSTPROCEDURE EVALUATION
Anesthesia Post Evaluation    Patient: James Baltazar    Procedure(s) Performed: Procedure(s) (LRB):  EGD, WITH CLOSED BIOPSY (N/A)  COLONOSCOPY, WITH POLYPECTOMY USING HOT BIOPSY FORCEPS AND SNARE (N/A)    Final Anesthesia Type: MAC      Patient location during evaluation: PACU  Patient participation: Yes- Able to Participate  Level of consciousness: awake  Post-procedure vital signs: reviewed and stable  Pain management: adequate  Airway patency: patent    PONV status at discharge: No PONV  Anesthetic complications: no      Cardiovascular status: blood pressure returned to baseline  Respiratory status: spontaneous ventilation  Hydration status: euvolemic  Follow-up not needed.              Vitals Value Taken Time   /60 05/14/24 1300   Temp 36.7 °C (98 °F) 05/14/24 1245   Pulse 61 05/14/24 1301   Resp 38 05/14/24 1301   SpO2 96 % 05/14/24 1300   Vitals shown include unfiled device data.      Event Time   Out of Recovery 13:03:00         Pain/Leandro Score: Leandro Score: 10 (5/14/2024  1:00 PM)

## 2024-05-23 LAB — SPECIMEN TO PATHOLOGY - SURGICAL: NORMAL

## 2024-06-06 PROBLEM — T84.84XA PAINFUL ORTHOPAEDIC HARDWARE: Status: ACTIVE | Noted: 2024-06-06

## 2024-06-19 ENCOUNTER — HOSPITAL ENCOUNTER (EMERGENCY)
Facility: HOSPITAL | Age: 79
Discharge: HOME | End: 2024-06-19
Attending: EMERGENCY MEDICINE
Payer: MEDICARE

## 2024-06-19 VITALS
DIASTOLIC BLOOD PRESSURE: 94 MMHG | OXYGEN SATURATION: 93 % | RESPIRATION RATE: 20 BRPM | SYSTOLIC BLOOD PRESSURE: 159 MMHG | BODY MASS INDEX: 23.06 KG/M2 | HEART RATE: 85 BPM | WEIGHT: 174 LBS | HEIGHT: 73 IN | TEMPERATURE: 100 F

## 2024-06-19 DIAGNOSIS — G89.29 CHRONIC LEFT SHOULDER PAIN: Primary | ICD-10-CM

## 2024-06-19 DIAGNOSIS — M25.512 CHRONIC LEFT SHOULDER PAIN: Primary | ICD-10-CM

## 2024-06-19 LAB
CTP QC/QA: YES
CTP QC/QA: YES
POC MOLECULAR INFLUENZA A AGN: NEGATIVE
POC MOLECULAR INFLUENZA B AGN: NEGATIVE
SARS-COV-2 RDRP RESP QL NAA+PROBE: NEGATIVE

## 2024-06-19 PROCEDURE — 99282 EMERGENCY DEPT VISIT SF MDM: CPT

## 2024-06-19 PROCEDURE — 87502 INFLUENZA DNA AMP PROBE: CPT

## 2024-06-19 PROCEDURE — 87635 SARS-COV-2 COVID-19 AMP PRB: CPT | Performed by: EMERGENCY MEDICINE

## 2024-06-19 RX ORDER — KETOROLAC TROMETHAMINE 30 MG/ML
30 INJECTION, SOLUTION INTRAMUSCULAR; INTRAVENOUS
Status: DISCONTINUED | OUTPATIENT
Start: 2024-06-19 | End: 2024-06-19 | Stop reason: HOSPADM

## 2024-06-19 RX ORDER — ACETAMINOPHEN 325 MG/1
650 TABLET ORAL
Status: DISCONTINUED | OUTPATIENT
Start: 2024-06-19 | End: 2024-06-19 | Stop reason: HOSPADM
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
## 2024-06-26 PROBLEM — R33.9 URINARY RETENTION: Status: ACTIVE | Noted: 2024-06-26

## 2024-06-26 PROBLEM — R11.2 NAUSEA AND VOMITING: Status: ACTIVE | Noted: 2024-06-26

## 2024-07-15 ENCOUNTER — LAB VISIT (OUTPATIENT)
Dept: LAB | Facility: HOSPITAL | Age: 79
End: 2024-07-15
Attending: INTERNAL MEDICINE
Payer: MEDICARE

## 2024-07-15 DIAGNOSIS — D64.9 ANEMIA, UNSPECIFIED TYPE: ICD-10-CM

## 2024-07-15 DIAGNOSIS — I10 ESSENTIAL HYPERTENSION: ICD-10-CM

## 2024-07-15 DIAGNOSIS — I20.9 ANGINA, CLASS III: ICD-10-CM

## 2024-07-15 DIAGNOSIS — E78.2 MIXED HYPERLIPIDEMIA: ICD-10-CM

## 2024-07-15 DIAGNOSIS — R73.01 IFG (IMPAIRED FASTING GLUCOSE): ICD-10-CM

## 2024-07-15 DIAGNOSIS — Z79.899 ENCOUNTER FOR LONG-TERM (CURRENT) USE OF OTHER MEDICATIONS: ICD-10-CM

## 2024-07-15 DIAGNOSIS — N18.32 STAGE 3B CHRONIC KIDNEY DISEASE: ICD-10-CM

## 2024-07-15 DIAGNOSIS — I25.118 CORONARY ARTERY DISEASE OF NATIVE ARTERY OF NATIVE HEART WITH STABLE ANGINA PECTORIS: ICD-10-CM

## 2024-07-15 LAB
ALBUMIN SERPL BCP-MCNC: 3.4 G/DL (ref 3.5–5.2)
ALP SERPL-CCNC: 150 U/L (ref 55–135)
ALT SERPL W/O P-5'-P-CCNC: 40 U/L (ref 10–44)
ANION GAP SERPL CALC-SCNC: 9 MMOL/L (ref 3–11)
AST SERPL-CCNC: 28 U/L (ref 10–40)
BASOPHILS # BLD AUTO: 0 K/UL (ref 0–0.2)
BASOPHILS NFR BLD: 0 % (ref 0–1.9)
BILIRUB SERPL-MCNC: 0.3 MG/DL (ref 0.1–1)
BUN SERPL-MCNC: 19 MG/DL (ref 8–23)
CALCIUM SERPL-MCNC: 9.2 MG/DL (ref 8.7–10.5)
CHLORIDE SERPL-SCNC: 103 MMOL/L (ref 95–110)
CHOLEST SERPL-MCNC: 127 MG/DL (ref 120–199)
CHOLEST/HDLC SERPL: 2.1 {RATIO} (ref 2–5)
CO2 SERPL-SCNC: 26 MMOL/L (ref 23–29)
CREAT SERPL-MCNC: 1.6 MG/DL (ref 0.5–1.4)
DIFFERENTIAL METHOD BLD: ABNORMAL
EOSINOPHIL # BLD AUTO: 0 K/UL (ref 0–0.5)
EOSINOPHIL NFR BLD: 0 % (ref 0–8)
ERYTHROCYTE [DISTWIDTH] IN BLOOD BY AUTOMATED COUNT: 13.6 % (ref 11.5–14.5)
EST. GFR  (NO RACE VARIABLE): 44.1 ML/MIN/1.73 M^2
GLUCOSE SERPL-MCNC: 89 MG/DL (ref 70–110)
HCT VFR BLD AUTO: 33.9 % (ref 40–54)
HDLC SERPL-MCNC: 61 MG/DL (ref 40–75)
HDLC SERPL: 48 % (ref 20–50)
HGB BLD-MCNC: 11.1 G/DL (ref 14–18)
IMM GRANULOCYTES # BLD AUTO: 0.04 K/UL (ref 0–0.04)
IMM GRANULOCYTES NFR BLD AUTO: 0.6 % (ref 0–0.5)
LDLC SERPL CALC-MCNC: 56.2 MG/DL (ref 63–159)
LYMPHOCYTES # BLD AUTO: 1.4 K/UL (ref 1–4.8)
LYMPHOCYTES NFR BLD: 19.5 % (ref 18–48)
MCH RBC QN AUTO: 28 PG (ref 27–31)
MCHC RBC AUTO-ENTMCNC: 32.7 G/DL (ref 32–36)
MCV RBC AUTO: 85 FL (ref 82–98)
MONOCYTES # BLD AUTO: 0.5 K/UL (ref 0.3–1)
MONOCYTES NFR BLD: 7 % (ref 4–15)
NEUTROPHILS # BLD AUTO: 5.1 K/UL (ref 1.8–7.7)
NEUTROPHILS NFR BLD: 72.9 % (ref 38–73)
NONHDLC SERPL-MCNC: 66 MG/DL
NRBC BLD-RTO: 0 /100 WBC
PLATELET # BLD AUTO: 226 K/UL (ref 150–450)
PMV BLD AUTO: 10.8 FL (ref 9.2–12.9)
POTASSIUM SERPL-SCNC: 4 MMOL/L (ref 3.5–5.1)
PROT SERPL-MCNC: 7.6 G/DL (ref 6–8.4)
RBC # BLD AUTO: 3.97 M/UL (ref 4.6–6.2)
SODIUM SERPL-SCNC: 138 MMOL/L (ref 136–145)
TRIGL SERPL-MCNC: 49 MG/DL (ref 30–150)
WBC # BLD AUTO: 6.97 K/UL (ref 3.9–12.7)

## 2024-07-15 PROCEDURE — 36415 COLL VENOUS BLD VENIPUNCTURE: CPT | Performed by: INTERNAL MEDICINE

## 2024-07-15 PROCEDURE — 80053 COMPREHEN METABOLIC PANEL: CPT | Performed by: INTERNAL MEDICINE

## 2024-07-15 PROCEDURE — 80061 LIPID PANEL: CPT | Performed by: INTERNAL MEDICINE

## 2024-07-15 PROCEDURE — 85025 COMPLETE CBC W/AUTO DIFF WBC: CPT | Performed by: INTERNAL MEDICINE

## 2024-08-10 ENCOUNTER — HOSPITAL ENCOUNTER (EMERGENCY)
Facility: HOSPITAL | Age: 79
Discharge: HOME | End: 2024-08-10
Attending: STUDENT IN AN ORGANIZED HEALTH CARE EDUCATION/TRAINING PROGRAM
Payer: MEDICARE

## 2024-08-10 VITALS
OXYGEN SATURATION: 99 % | HEIGHT: 72 IN | TEMPERATURE: 98 F | HEART RATE: 63 BPM | DIASTOLIC BLOOD PRESSURE: 78 MMHG | SYSTOLIC BLOOD PRESSURE: 163 MMHG | RESPIRATION RATE: 14 BRPM | WEIGHT: 180 LBS | BODY MASS INDEX: 24.38 KG/M2

## 2024-08-10 DIAGNOSIS — R10.9 ABDOMINAL PAIN, UNSPECIFIED ABDOMINAL LOCATION: Primary | ICD-10-CM

## 2024-08-10 LAB
ALBUMIN SERPL BCP-MCNC: 3.4 G/DL (ref 3.5–5.2)
ALP SERPL-CCNC: 136 U/L (ref 55–135)
ALT SERPL W/O P-5'-P-CCNC: 30 U/L (ref 10–44)
ANION GAP SERPL CALC-SCNC: 10 MMOL/L (ref 3–11)
AST SERPL-CCNC: 22 U/L (ref 10–40)
BASOPHILS # BLD AUTO: 0 K/UL (ref 0–0.2)
BASOPHILS NFR BLD: 0 % (ref 0–1.9)
BILIRUB SERPL-MCNC: 0.3 MG/DL (ref 0.1–1)
BILIRUB UR QL STRIP: NEGATIVE
BUN SERPL-MCNC: 15 MG/DL (ref 8–23)
CALCIUM SERPL-MCNC: 8.9 MG/DL (ref 8.7–10.5)
CHLORIDE SERPL-SCNC: 107 MMOL/L (ref 95–110)
CLARITY UR: CLEAR
CO2 SERPL-SCNC: 25 MMOL/L (ref 23–29)
COLOR UR: YELLOW
CREAT SERPL-MCNC: 1.6 MG/DL (ref 0.5–1.4)
DIFFERENTIAL METHOD BLD: ABNORMAL
EOSINOPHIL # BLD AUTO: 0 K/UL (ref 0–0.5)
EOSINOPHIL NFR BLD: 0 % (ref 0–8)
ERYTHROCYTE [DISTWIDTH] IN BLOOD BY AUTOMATED COUNT: 14.6 % (ref 11.5–14.5)
EST. GFR  (NO RACE VARIABLE): 44.1 ML/MIN/1.73 M^2
GLUCOSE SERPL-MCNC: 97 MG/DL (ref 70–110)
GLUCOSE UR QL STRIP: NEGATIVE
HCT VFR BLD AUTO: 33.8 % (ref 40–54)
HGB BLD-MCNC: 11.1 G/DL (ref 14–18)
HGB UR QL STRIP: NEGATIVE
IMM GRANULOCYTES # BLD AUTO: 0.01 K/UL (ref 0–0.04)
IMM GRANULOCYTES NFR BLD AUTO: 0.2 % (ref 0–0.5)
KETONES UR QL STRIP: NEGATIVE
LEUKOCYTE ESTERASE UR QL STRIP: NEGATIVE
LIPASE SERPL-CCNC: 23 U/L (ref 13–75)
LYMPHOCYTES # BLD AUTO: 1.7 K/UL (ref 1–4.8)
LYMPHOCYTES NFR BLD: 28.8 % (ref 18–48)
MCH RBC QN AUTO: 27.4 PG (ref 27–31)
MCHC RBC AUTO-ENTMCNC: 32.8 G/DL (ref 32–36)
MCV RBC AUTO: 84 FL (ref 82–98)
MONOCYTES # BLD AUTO: 0.5 K/UL (ref 0.3–1)
MONOCYTES NFR BLD: 9 % (ref 4–15)
NEUTROPHILS # BLD AUTO: 3.7 K/UL (ref 1.8–7.7)
NEUTROPHILS NFR BLD: 62 % (ref 38–73)
NITRITE UR QL STRIP: NEGATIVE
NRBC BLD-RTO: 0 /100 WBC
NT-PROBNP SERPL-MCNC: 738 PG/ML (ref 5–1800)
OHS QRS DURATION: 84 MS
OHS QTC CALCULATION: 424 MS
PH UR STRIP: 7 [PH] (ref 5–8)
PLATELET # BLD AUTO: 177 K/UL (ref 150–450)
PMV BLD AUTO: 11.4 FL (ref 9.2–12.9)
POTASSIUM SERPL-SCNC: 3.4 MMOL/L (ref 3.5–5.1)
PROT SERPL-MCNC: 7.1 G/DL (ref 6–8.4)
PROT UR QL STRIP: NEGATIVE
RBC # BLD AUTO: 4.05 M/UL (ref 4.6–6.2)
SODIUM SERPL-SCNC: 142 MMOL/L (ref 136–145)
SP GR UR STRIP: 1.01 (ref 1–1.03)
TROPONIN I SERPL DL<=0.01 NG/ML-MCNC: 10.3 PG/ML (ref 0–60)
URN SPEC COLLECT METH UR: NORMAL
UROBILINOGEN UR STRIP-ACNC: NEGATIVE EU/DL
WBC # BLD AUTO: 5.9 K/UL (ref 3.9–12.7)

## 2024-08-10 PROCEDURE — 83880 ASSAY OF NATRIURETIC PEPTIDE: CPT | Performed by: STUDENT IN AN ORGANIZED HEALTH CARE EDUCATION/TRAINING PROGRAM

## 2024-08-10 PROCEDURE — 93005 ELECTROCARDIOGRAM TRACING: CPT

## 2024-08-10 PROCEDURE — 36415 COLL VENOUS BLD VENIPUNCTURE: CPT | Performed by: STUDENT IN AN ORGANIZED HEALTH CARE EDUCATION/TRAINING PROGRAM

## 2024-08-10 PROCEDURE — 81003 URINALYSIS AUTO W/O SCOPE: CPT | Performed by: STUDENT IN AN ORGANIZED HEALTH CARE EDUCATION/TRAINING PROGRAM

## 2024-08-10 PROCEDURE — 83690 ASSAY OF LIPASE: CPT | Performed by: STUDENT IN AN ORGANIZED HEALTH CARE EDUCATION/TRAINING PROGRAM

## 2024-08-10 PROCEDURE — 63600175 PHARM REV CODE 636 W HCPCS: Performed by: STUDENT IN AN ORGANIZED HEALTH CARE EDUCATION/TRAINING PROGRAM

## 2024-08-10 PROCEDURE — 80053 COMPREHEN METABOLIC PANEL: CPT | Performed by: STUDENT IN AN ORGANIZED HEALTH CARE EDUCATION/TRAINING PROGRAM

## 2024-08-10 PROCEDURE — 99285 EMERGENCY DEPT VISIT HI MDM: CPT | Mod: 25

## 2024-08-10 PROCEDURE — 96374 THER/PROPH/DIAG INJ IV PUSH: CPT

## 2024-08-10 PROCEDURE — 93010 ELECTROCARDIOGRAM REPORT: CPT | Mod: ,,, | Performed by: INTERNAL MEDICINE

## 2024-08-10 PROCEDURE — 85025 COMPLETE CBC W/AUTO DIFF WBC: CPT | Performed by: STUDENT IN AN ORGANIZED HEALTH CARE EDUCATION/TRAINING PROGRAM

## 2024-08-10 PROCEDURE — 96375 TX/PRO/DX INJ NEW DRUG ADDON: CPT

## 2024-08-10 PROCEDURE — 84484 ASSAY OF TROPONIN QUANT: CPT | Performed by: STUDENT IN AN ORGANIZED HEALTH CARE EDUCATION/TRAINING PROGRAM

## 2024-08-10 PROCEDURE — 25000003 PHARM REV CODE 250: Performed by: STUDENT IN AN ORGANIZED HEALTH CARE EDUCATION/TRAINING PROGRAM

## 2024-08-10 RX ORDER — FAMOTIDINE 10 MG/ML
20 INJECTION INTRAVENOUS
Status: COMPLETED | OUTPATIENT
Start: 2024-08-10 | End: 2024-08-10

## 2024-08-10 RX ORDER — METOPROLOL SUCCINATE 50 MG/1
50 TABLET, EXTENDED RELEASE ORAL DAILY
COMMUNITY

## 2024-08-10 RX ORDER — KETOROLAC TROMETHAMINE 30 MG/ML
15 INJECTION, SOLUTION INTRAMUSCULAR; INTRAVENOUS
Status: COMPLETED | OUTPATIENT
Start: 2024-08-10 | End: 2024-08-10

## 2024-08-10 RX ORDER — TAMSULOSIN HYDROCHLORIDE 0.4 MG/1
CAPSULE ORAL DAILY
COMMUNITY

## 2024-08-10 RX ORDER — SUCRALFATE 1 G/10ML
2 SUSPENSION ORAL
Status: COMPLETED | OUTPATIENT
Start: 2024-08-10 | End: 2024-08-10

## 2024-08-10 RX ORDER — AMLODIPINE BESYLATE 10 MG/1
10 TABLET ORAL DAILY
COMMUNITY

## 2024-08-10 RX ORDER — SUCRALFATE 1 G/1
1 TABLET ORAL 4 TIMES DAILY
Qty: 28 TABLET | Refills: 0 | Status: SHIPPED | OUTPATIENT
Start: 2024-08-10 | End: 2024-08-17

## 2024-08-10 RX ADMIN — SUCRALFATE 2 G: 1 SUSPENSION ORAL at 02:08

## 2024-08-10 RX ADMIN — FAMOTIDINE 20 MG: 10 INJECTION, SOLUTION INTRAVENOUS at 02:08

## 2024-08-10 RX ADMIN — KETOROLAC TROMETHAMINE 15 MG: 30 INJECTION, SOLUTION INTRAMUSCULAR at 03:08

## 2024-08-10 NOTE — ED PROVIDER NOTES
History  Chief Complaint   Patient presents with    Shortness of Breath     Pt states today started with SOB, urinating a lot, and stomach cramping/burning. Took Mylanta with little relief.  Reports had back sx in June.      78-year-old male history of CAD status post stents, diabetes, diverticulitis, GERD, hypertension and hyperlipidemia presents for evaluation of epigastric/periumbilical abdominal discomfort.  Symptoms associated with systemic cramping/burning.  Patient also noted shortness of breath as result of discomfort.  Mylanta taken for symptom relief without improvement.  No nausea, vomiting, diarrhea or constipation endorsed.  No urinary symptoms endorsed        Past Medical History:   Diagnosis Date    Abnormal myocardial perfusion study     Acute blood loss anemia 05/2024    Anticoagulant long-term use     Arthritis     BPH (benign prostatic hyperplasia)     Coronary artery disease     PTCA STENT RCA/CFX 2014 and CFX 2021    Diabetes mellitus     Digestive disorder     Diverticulitis 03/02/2022    Encounter for blood transfusion     GERD (gastroesophageal reflux disease)     Gout     H/O heart artery stent     High cholesterol     Hypercholesteremia     Hypertension     Kidney cysts     Liver disease     UTI (urinary tract infection)     Wears dentures     full top partial bottom       Past Surgical History:   Procedure Laterality Date    ANTERIOR CERVICAL DISCECTOMY W/ FUSION  2007    cardiac stents      CATARACT EXTRACTION, BILATERAL      COLONOSCOPY N/A 05/29/2017    Procedure: COLONOSCOPY;  Surgeon: Luis Bogran-Reyes, MD;  Location: [facility];  Service: Endoscopy;  Laterality: N/A;    COLONOSCOPY, WITH POLYPECTOMY USING HOT BIOPSY FORCEPS N/A 5/14/2024    Procedure: COLONOSCOPY, WITH POLYPECTOMY USING HOT BIOPSY FORCEPS AND SNARE;  Surgeon: Luma Lora MD;  Location: [facility];  Service: Colon and Rectal;  Laterality: N/A;  8th 1030    CORONARY ANGIOGRAPHY N/A 02/02/2021    Procedure:  ANGIOGRAM, CORONARY ARTERY;  Surgeon: James Nash MD;  [facility];  Service: Cardiology;  Laterality: N/A;    DIRECT LATERAL INTERBODY FUSION (DLIF) OF SPINE N/A 02/06/2023    Procedure: FUSION, SPINE, DLIF, PRONE LATERAL INTERBODY FUSION W/ BMP, L3-4;  Surgeon: Hunter Langston MD;  [facility];  Service: Orthopedics;  Laterality: N/A;  Alphatech for Prone, Nuvasive for PSF    EGD, WITH CLOSED BIOPSY N/A 5/14/2024    Procedure: EGD, WITH CLOSED BIOPSY;  Surgeon: Luma Lora MD;  Location: [facility];  Service: Colon and Rectal;  Laterality: N/A;    ESOPHAGOGASTRODUODENOSCOPY      FACETECTOMY OF VERTEBRA Left 08/26/2022    Procedure: FACETECTOMY, PARTIAL MEDIAL;  Surgeon: Hunter Langston MD;  [facility];  Service: Orthopedics;  Laterality: Left;    FORAMINOTOMY Left 08/26/2022    Procedure: FORAMINOTOMY, SPINE;  Surgeon: Hunter Langston MD;  [facility];  Service: Orthopedics;  Laterality: Left;    FUSION OF LUMBAR SPINE USING POSTERIOR INTERBODY TECHNIQUE N/A 02/06/2023    Procedure: FUSION, SPINE, LUMBAR, PLIF, REVISION, L2-L5 W/ REPAIR NONUNION L3-4;  Surgeon: Hunter Langston MD;  [facility];  Service: Orthopedics;  Laterality: N/A;    LAMINOTOMY Left 08/26/2022    Procedure: LAMINOTOMY, POSTERIOR, CERVICAL, LEFT C6-C7;  Surgeon: Hunter Langston MD;  [facility];  Service: Orthopedics;  Laterality: Left;    LUMBAR LAMINECTOMY  12/14/2012    L2-4, Dr. Stover    PROSTATE SURGERY      RE-EXPLORATION N/A 6/26/2024    Procedure: RE-EXPLORATION, INSPECTION FUSION MASS, L2-L5;  Surgeon: Hunter Langston MD;  [facility];  Service: Orthopedics;  Laterality: N/A;    REMOVAL OF HARDWARE FROM SPINE N/A 6/26/2024    Procedure: REMOVAL, HARDWARE, SPINE, REMOVAL OF POSTERIOR SPINAL INSTRUMENTATION L2-L5;  Surgeon: Hunter Langston MD;  Location: [facility] OR;  Service: Orthopedics;  Laterality: N/A;  ALPHATEC       Family History   Problem Relation Name Age of  Onset    Stroke Mother      Hypertension Mother      Heart disease Mother      Cataracts Father      Heart disease Father      Thyroid disease Sister      Blindness Brother      Heart disease Brother      Stroke Sister         Social History     Tobacco Use    Smoking status: Former     Current packs/day: 0.00     Types: Cigarettes     Quit date: 1980     Years since quittin.7    Smokeless tobacco: Never   Substance Use Topics    Alcohol use: Yes     Comment: SELDOM    Drug use: No       ROS  Review of Systems   Respiratory:  Positive for shortness of breath.    Gastrointestinal:  Positive for abdominal pain.       Physical Exam  BP (!) 163/78   Pulse 63   Temp 98 °F (36.7 °C) (Oral)   Resp 14   Ht 6' (1.829 m)   Wt 81.6 kg (180 lb)   SpO2 99%   BMI 24.41 kg/m²   Physical Exam    Constitutional: He appears well-developed and well-nourished. He is cooperative.   HENT:   Head: Normocephalic and atraumatic.   Eyes: Conjunctivae, EOM and lids are normal. Pupils are equal, round, and reactive to light.   Neck: Phonation normal.   Normal range of motion.  Cardiovascular:  Normal rate, regular rhythm and intact distal pulses.           Pulmonary/Chest: Breath sounds normal. No respiratory distress.   Abdominal: Abdomen is soft. There is abdominal tenderness.   Musculoskeletal:      Cervical back: Normal range of motion.     Neurological: He is alert and oriented to person, place, and time.   Skin: Skin is warm and dry.   Psychiatric: He has a normal mood and affect. His speech is normal and behavior is normal.               Labs Reviewed   CBC W/ AUTO DIFFERENTIAL - Abnormal       Result Value    WBC 5.90      RBC 4.05 (*)     Hemoglobin 11.1 (*)     Hematocrit 33.8 (*)     MCV 84      MCH 27.4      MCHC 32.8      RDW 14.6 (*)     Platelets 177      MPV 11.4      Immature Granulocytes 0.2      Gran # (ANC) 3.7      Immature Grans (Abs) 0.01      Lymph # 1.7      Mono # 0.5      Eos # 0.0      Baso # 0.00       nRBC 0      Gran % 62.0      Lymph % 28.8      Mono % 9.0      Eosinophil % 0.0      Basophil % 0.0      Differential Method Automated     COMPREHENSIVE METABOLIC PANEL - Abnormal    Sodium 142      Potassium 3.4 (*)     Chloride 107      CO2 25      Glucose 97      BUN 15      Creatinine 1.6 (*)     Calcium 8.9      Total Protein 7.1      Albumin 3.4 (*)     Total Bilirubin 0.3      Alkaline Phosphatase 136 (*)     AST 22      ALT 30      eGFR 44.1 (*)     Anion Gap 10     LIPASE    Lipase 23     URINALYSIS, REFLEX TO URINE CULTURE    Specimen UA Urine, Clean Catch      Color, UA Yellow      Appearance, UA Clear      pH, UA 7.0      Specific Gravity, UA 1.010      Protein, UA Negative      Glucose, UA Negative      Ketones, UA Negative      Bilirubin (UA) Negative      Occult Blood UA Negative      Nitrite, UA Negative      Urobilinogen, UA Negative      Leukocytes, UA Negative      Narrative:     Preferred Collection Type->Urine, Clean Catch  Specimen Source->Urine   TROPONIN I HIGH SENSITIVITY    Troponin I High Sensitivity 10.3     NT-PRO NATRIURETIC PEPTIDE    NT-proBNP 738       EKG Readings: (Independently Interpreted)   Initial Reading: No STEMI. Rhythm: Normal Sinus Rhythm. Heart Rate: 65.     Type of Interpretation: ED Physician (Independently Interpreted).  Radiology Procedure Done: Portable CXR.  Interpretation: No focal consolidation, effusion, pneumothorax        Imaging Results              X-Ray Chest 1 View (In process)                                 Procedures             Medical Decision Making  Differentials include gastritis, GERD pancreatitis, cholecystitis as or alternate pathology.  Will obtain labs and imaging.  See ED course for updates    Amount and/or Complexity of Data Reviewed  Labs: ordered.  Radiology: ordered. Decision-making details documented in ED Course.    Risk  Prescription drug management.               ED Course as of 08/10/24 0543   Sat Aug 10, 2024   0243 X-Ray Chest 1  View  Diffuse metallic fragments in the left chest wall.  No focal consolidation, effusion, or pneumothorax [NA]   0300 Patient was symptom improvement, notes cramping has largely ceased [NA]   0332 All labs reviewed unremarkable.  Suspect symptoms related to gastritis as patient noted complete symptom resolution after medication administration.  Will discharged advised continue symptomatic support in the outpatient setting.  Return precautions were given.  [NA]      ED Course User Index  [NA] Steph Mullins MD       DISCHARGE NOTE:       James Baltazar's  results have been reviewed with him.  He has been counseled regarding his diagnosis, treatment, and plan.  He verbally conveys understanding and agreement of the signs, symptoms, diagnosis, treatment and prognosis and additionally agrees to follow up as discussed.  He also agrees with the care-plan and conveys that all of his questions have been answered.  I have also provided discharge instructions for him that include: educational information regarding their diagnosis and treatment, and list of reasons why they would want to return to the ED prior to their follow-up appointment, should his condition change. He has been provided with education for proper emergency department utilization.      CLINICAL IMPRESSION:         1. Abdominal pain, unspecified abdominal location              PLAN:   1. Discharge  2.   Discharge Medication List as of 8/10/2024  3:39 AM        3. Tho Duran Jr., MD  [address]   [address] [address]  [phone]    Schedule an appointment as soon as possible for a visit   As needed          Steph Mullins MD  08/10/24 1229

## 2024-08-19 PROBLEM — K62.5 RECTAL BLEEDING: Status: RESOLVED | Noted: 2024-05-04 | Resolved: 2024-08-19

## 2024-08-31 ENCOUNTER — LAB VISIT (OUTPATIENT)
Dept: LAB | Facility: HOSPITAL | Age: 79
End: 2024-08-31
Attending: INTERNAL MEDICINE
Payer: MEDICARE

## 2024-08-31 DIAGNOSIS — N18.31 CHRONIC KIDNEY DISEASE, STAGE 3A: ICD-10-CM

## 2024-08-31 DIAGNOSIS — N40.1 BENIGN PROSTATIC HYPERPLASIA WITH LOWER URINARY TRACT SYMPTOMS, SYMPTOM DETAILS UNSPECIFIED: Primary | ICD-10-CM

## 2024-08-31 LAB
ALBUMIN SERPL BCP-MCNC: 3.5 G/DL (ref 3.5–5.2)
ALP SERPL-CCNC: 136 U/L (ref 55–135)
ALT SERPL W/O P-5'-P-CCNC: 68 U/L (ref 10–44)
ANION GAP SERPL CALC-SCNC: 12 MMOL/L (ref 3–11)
AST SERPL-CCNC: 31 U/L (ref 10–40)
BASOPHILS # BLD AUTO: 0.01 K/UL (ref 0–0.2)
BASOPHILS NFR BLD: 0.2 % (ref 0–1.9)
BILIRUB SERPL-MCNC: 0.4 MG/DL (ref 0.1–1)
BUN SERPL-MCNC: 21 MG/DL (ref 8–23)
CALCIUM SERPL-MCNC: 9.4 MG/DL (ref 8.7–10.5)
CHLORIDE SERPL-SCNC: 101 MMOL/L (ref 95–110)
CO2 SERPL-SCNC: 28 MMOL/L (ref 23–29)
CREAT SERPL-MCNC: 1.8 MG/DL (ref 0.5–1.4)
DIFFERENTIAL METHOD BLD: ABNORMAL
EOSINOPHIL # BLD AUTO: 0 K/UL (ref 0–0.5)
EOSINOPHIL NFR BLD: 0 % (ref 0–8)
ERYTHROCYTE [DISTWIDTH] IN BLOOD BY AUTOMATED COUNT: 15 % (ref 11.5–14.5)
EST. GFR  (NO RACE VARIABLE): 38.3 ML/MIN/1.73 M^2
GLUCOSE SERPL-MCNC: 98 MG/DL (ref 70–110)
HCT VFR BLD AUTO: 36.5 % (ref 40–54)
HGB BLD-MCNC: 12.3 G/DL (ref 14–18)
IMM GRANULOCYTES # BLD AUTO: 0.02 K/UL (ref 0–0.04)
IMM GRANULOCYTES NFR BLD AUTO: 0.3 % (ref 0–0.5)
LYMPHOCYTES # BLD AUTO: 1.3 K/UL (ref 1–4.8)
LYMPHOCYTES NFR BLD: 22.3 % (ref 18–48)
MCH RBC QN AUTO: 27.4 PG (ref 27–31)
MCHC RBC AUTO-ENTMCNC: 33.7 G/DL (ref 32–36)
MCV RBC AUTO: 81 FL (ref 82–98)
MONOCYTES # BLD AUTO: 0.3 K/UL (ref 0.3–1)
MONOCYTES NFR BLD: 5.5 % (ref 4–15)
NEUTROPHILS # BLD AUTO: 4.1 K/UL (ref 1.8–7.7)
NEUTROPHILS NFR BLD: 71.7 % (ref 38–73)
NRBC BLD-RTO: 0 /100 WBC
PLATELET # BLD AUTO: 168 K/UL (ref 150–450)
PMV BLD AUTO: 10.2 FL (ref 9.2–12.9)
POTASSIUM SERPL-SCNC: 3.5 MMOL/L (ref 3.5–5.1)
PROT SERPL-MCNC: 7.3 G/DL (ref 6–8.4)
RBC # BLD AUTO: 4.49 M/UL (ref 4.6–6.2)
SODIUM SERPL-SCNC: 141 MMOL/L (ref 136–145)
WBC # BLD AUTO: 5.78 K/UL (ref 3.9–12.7)

## 2024-08-31 PROCEDURE — 80053 COMPREHEN METABOLIC PANEL: CPT | Performed by: INTERNAL MEDICINE

## 2024-08-31 PROCEDURE — 85025 COMPLETE CBC W/AUTO DIFF WBC: CPT | Performed by: INTERNAL MEDICINE

## 2024-08-31 PROCEDURE — 36415 COLL VENOUS BLD VENIPUNCTURE: CPT | Performed by: INTERNAL MEDICINE

## 2024-09-06 ENCOUNTER — HOSPITAL ENCOUNTER (OUTPATIENT)
Dept: RADIOLOGY | Facility: HOSPITAL | Age: 79
Discharge: HOME | End: 2024-09-06
Attending: INTERNAL MEDICINE
Payer: MEDICARE

## 2024-09-06 DIAGNOSIS — Q61.3 POLYCYSTIC KIDNEY DISEASE: ICD-10-CM

## 2024-09-06 PROCEDURE — 76770 US EXAM ABDO BACK WALL COMP: CPT | Mod: TC

## 2024-10-31 ENCOUNTER — HOSPITAL ENCOUNTER (OUTPATIENT)
Dept: RADIOLOGY | Facility: HOSPITAL | Age: 79
Discharge: HOME | End: 2024-10-31
Attending: INTERNAL MEDICINE
Payer: MEDICARE

## 2024-10-31 DIAGNOSIS — M54.9 DORSALGIA, UNSPECIFIED: ICD-10-CM

## 2024-10-31 PROCEDURE — 72148 MRI LUMBAR SPINE W/O DYE: CPT | Mod: TC

## 2024-12-27 PROBLEM — Z00.00 WELLNESS EXAMINATION: Status: ACTIVE | Noted: 2024-12-27

## 2025-01-28 ENCOUNTER — HOSPITAL ENCOUNTER (EMERGENCY)
Facility: HOSPITAL | Age: 80
Discharge: HOME | End: 2025-01-28
Attending: EMERGENCY MEDICINE
Payer: MEDICARE

## 2025-01-28 VITALS
OXYGEN SATURATION: 98 % | HEIGHT: 73 IN | HEART RATE: 86 BPM | TEMPERATURE: 98 F | SYSTOLIC BLOOD PRESSURE: 142 MMHG | BODY MASS INDEX: 21.87 KG/M2 | WEIGHT: 165 LBS | DIASTOLIC BLOOD PRESSURE: 72 MMHG | RESPIRATION RATE: 18 BRPM

## 2025-01-28 DIAGNOSIS — N40.0 ENLARGED PROSTATE: ICD-10-CM

## 2025-01-28 DIAGNOSIS — R31.9 HEMATURIA, UNSPECIFIED TYPE: Primary | ICD-10-CM

## 2025-01-28 DIAGNOSIS — R91.1 PULMONARY NODULE: ICD-10-CM

## 2025-01-28 LAB
ANION GAP SERPL CALC-SCNC: 9 MMOL/L (ref 8–16)
BACTERIA #/AREA URNS HPF: NEGATIVE /HPF
BASOPHILS # BLD AUTO: 0.01 K/UL (ref 0–0.2)
BASOPHILS NFR BLD: 0.1 % (ref 0–1.9)
BILIRUB UR QL STRIP: NEGATIVE
BUN SERPL-MCNC: 25 MG/DL (ref 8–23)
CALCIUM SERPL-MCNC: 9.4 MG/DL (ref 8.7–10.5)
CHLORIDE SERPL-SCNC: 106 MMOL/L (ref 95–110)
CLARITY UR: CLEAR
CO2 SERPL-SCNC: 22 MMOL/L (ref 23–29)
COLOR UR: ABNORMAL
CREAT SERPL-MCNC: 1.8 MG/DL (ref 0.5–1.4)
DIFFERENTIAL METHOD BLD: ABNORMAL
EOSINOPHIL # BLD AUTO: 0 K/UL (ref 0–0.5)
EOSINOPHIL NFR BLD: 0 % (ref 0–8)
ERYTHROCYTE [DISTWIDTH] IN BLOOD BY AUTOMATED COUNT: 14.8 % (ref 11.5–14.5)
EST. GFR  (NO RACE VARIABLE): 38 ML/MIN/1.73 M^2
GLUCOSE SERPL-MCNC: 88 MG/DL (ref 70–110)
GLUCOSE UR QL STRIP: NEGATIVE
HCT VFR BLD AUTO: 39.1 % (ref 40–54)
HGB BLD-MCNC: 12.8 G/DL (ref 14–18)
HGB UR QL STRIP: ABNORMAL
HYALINE CASTS #/AREA URNS LPF: 0.3 /LPF
IMM GRANULOCYTES # BLD AUTO: 0.05 K/UL (ref 0–0.04)
IMM GRANULOCYTES NFR BLD AUTO: 0.5 % (ref 0–0.5)
KETONES UR QL STRIP: NEGATIVE
LEUKOCYTE ESTERASE UR QL STRIP: NEGATIVE
LYMPHOCYTES # BLD AUTO: 1.9 K/UL (ref 1–4.8)
LYMPHOCYTES NFR BLD: 20.2 % (ref 18–48)
MCH RBC QN AUTO: 28.1 PG (ref 27–31)
MCHC RBC AUTO-ENTMCNC: 32.7 G/DL (ref 32–36)
MCV RBC AUTO: 86 FL (ref 82–98)
MICROSCOPIC COMMENT: ABNORMAL
MONOCYTES # BLD AUTO: 1 K/UL (ref 0.3–1)
MONOCYTES NFR BLD: 10.8 % (ref 4–15)
NEUTROPHILS # BLD AUTO: 6.4 K/UL (ref 1.8–7.7)
NEUTROPHILS NFR BLD: 68.4 % (ref 38–73)
NITRITE UR QL STRIP: NEGATIVE
NRBC BLD-RTO: 0 /100 WBC
PH UR STRIP: 6 [PH] (ref 5–8)
PLATELET # BLD AUTO: 219 K/UL (ref 150–450)
PMV BLD AUTO: 9.5 FL (ref 9.2–12.9)
POTASSIUM SERPL-SCNC: 4.6 MMOL/L (ref 3.5–5.1)
PROT UR QL STRIP: ABNORMAL
RBC # BLD AUTO: 4.55 M/UL (ref 4.6–6.2)
RBC #/AREA URNS HPF: >100 /HPF (ref 0–4)
SODIUM SERPL-SCNC: 137 MMOL/L (ref 136–145)
SP GR UR STRIP: 1.01 (ref 1–1.03)
SQUAMOUS #/AREA URNS HPF: 0 /HPF
URN SPEC COLLECT METH UR: ABNORMAL
UROBILINOGEN UR STRIP-ACNC: NEGATIVE EU/DL
WBC # BLD AUTO: 9.28 K/UL (ref 3.9–12.7)
WBC #/AREA URNS HPF: 2 /HPF (ref 0–5)

## 2025-01-28 PROCEDURE — 36415 COLL VENOUS BLD VENIPUNCTURE: CPT | Performed by: CLINICAL NURSE SPECIALIST

## 2025-01-28 PROCEDURE — 85025 COMPLETE CBC W/AUTO DIFF WBC: CPT | Performed by: CLINICAL NURSE SPECIALIST

## 2025-01-28 PROCEDURE — 25000003 PHARM REV CODE 250: Performed by: CLINICAL NURSE SPECIALIST

## 2025-01-28 PROCEDURE — 99284 EMERGENCY DEPT VISIT MOD MDM: CPT | Mod: 25

## 2025-01-28 PROCEDURE — 81000 URINALYSIS NONAUTO W/SCOPE: CPT | Performed by: CLINICAL NURSE SPECIALIST

## 2025-01-28 PROCEDURE — 80048 BASIC METABOLIC PNL TOTAL CA: CPT | Performed by: CLINICAL NURSE SPECIALIST

## 2025-01-28 RX ORDER — TAMSULOSIN HYDROCHLORIDE 0.4 MG/1
0.4 CAPSULE ORAL DAILY
Qty: 10 CAPSULE | Refills: 0 | Status: SHIPPED | OUTPATIENT
Start: 2025-01-28 | End: 2026-01-28

## 2025-01-28 RX ORDER — SULFAMETHOXAZOLE AND TRIMETHOPRIM 800; 160 MG/1; MG/1
1 TABLET ORAL 2 TIMES DAILY
Qty: 14 TABLET | Refills: 0 | Status: SHIPPED | OUTPATIENT
Start: 2025-01-28 | End: 2025-02-04

## 2025-01-28 RX ORDER — CLONIDINE HYDROCHLORIDE 0.1 MG/1
0.1 TABLET ORAL ONCE
Status: COMPLETED | OUTPATIENT
Start: 2025-01-28 | End: 2025-01-28

## 2025-01-28 RX ADMIN — CLONIDINE HYDROCHLORIDE 0.1 MG: 0.1 TABLET ORAL at 01:01

## 2025-01-28 NOTE — DISCHARGE INSTRUCTIONS
Ct stone study: New predominantly sub 5 mm nodular densities of the visualized lungs.  A follow-up noncontrast chest CT may be obtained in 3-6 months for re-evaluation.       Follow-up with urology due to having hematuria an enlarged prostate per CT.

## 2025-01-28 NOTE — ED PROVIDER NOTES
"Encounter Date: 1/28/2025       History     Chief Complaint   Patient presents with    Back Pain     Pt to the ER with complaints of severe chronic back pain and pt states"my urine is red and has blood in it" Pt states he noticed it this AM. Pain 10/10, took robaxin with no relief, denies taking BP meds as well.       79-year-old male presents emergency room with hematuria which he noticed this morning.  History of UTIs.  Patient is uncertain if he is on blood thinners currently.  Denies any prostate issues.  Sees Nephrology for cyst on his kidneys.  Patient has a history of chronic back pain but states he is here today for the blood in his urine.  BP is 119/112, patient did not take his blood pressure medication this morning.  Patient brought a sample of his urine in a jar to be viewed by provider        Review of patient's allergies indicates:   Allergen Reactions    Aspirin Other (See Comments)     GI intolerant     Bidil [isosorbide-hydralazine] Other (See Comments)     Tounge burning       Penicillins Rash    Praluent pen [alirocumab] Rash    Statins-hmg-coa reductase inhibitors     Zetia [ezetimibe] Other (See Comments)     Past Medical History:   Diagnosis Date    Abnormal myocardial perfusion study     Acute blood loss anemia 05/2024    Anticoagulant long-term use     Arthritis     BPH (benign prostatic hyperplasia)     Coronary artery disease     PTCA STENT RCA/CFX 2014 and CFX 2021    Diabetes mellitus     Digestive disorder     Diverticulitis 03/02/2022    Encounter for blood transfusion     GERD (gastroesophageal reflux disease)     Gout     H/O heart artery stent     High cholesterol     Hypercholesteremia     Hypertension     Kidney cysts     Liver disease     UTI (urinary tract infection)     Wears dentures     full top partial bottom     Past Surgical History:   Procedure Laterality Date    ANTERIOR CERVICAL DISCECTOMY W/ FUSION  2007    cardiac stents      CATARACT EXTRACTION, BILATERAL      " COLONOSCOPY N/A 05/29/2017    Procedure: COLONOSCOPY;  Surgeon: Luis Bogran-Reyes, MD;  [facility];  Service: Endoscopy;  Laterality: N/A;    COLONOSCOPY, WITH POLYPECTOMY USING HOT BIOPSY FORCEPS N/A 5/14/2024    Procedure: COLONOSCOPY, WITH POLYPECTOMY USING HOT BIOPSY FORCEPS AND SNARE;  Surgeon: Luma Lora MD;  Location: [facility] ENDO;  Service: Colon and Rectal;  Laterality: N/A;  8th 1030    CORONARY ANGIOGRAPHY N/A 02/02/2021    Procedure: ANGIOGRAM, CORONARY ARTERY;  Surgeon: James Nash MD;  Location: [facility] CATH;  Service: Cardiology;  Laterality: N/A;    DIRECT LATERAL INTERBODY FUSION (DLIF) OF SPINE N/A 02/06/2023    Procedure: FUSION, SPINE, DLIF, PRONE LATERAL INTERBODY FUSION W/ BMP, L3-4;  Surgeon: Hunter Langston MD;  Location: [facility] OR;  Service: Orthopedics;  Laterality: N/A;  Alphatech for Prone, Nuvasive for PSF    EGD, WITH CLOSED BIOPSY N/A 5/14/2024    Procedure: EGD, WITH CLOSED BIOPSY;  Surgeon: Luma Lora MD;  Location: [facility];  Service: Colon and Rectal;  Laterality: N/A;    ESOPHAGOGASTRODUODENOSCOPY      FACETECTOMY OF VERTEBRA Left 08/26/2022    Procedure: FACETECTOMY, PARTIAL MEDIAL;  Surgeon: Hunter Langston MD;  Location: [facility] OR;  Service: Orthopedics;  Laterality: Left;    FORAMINOTOMY Left 08/26/2022    Procedure: FORAMINOTOMY, SPINE;  Surgeon: Hunter Langston MD;  Location: [facility] OR;  Service: Orthopedics;  Laterality: Left;    FUSION OF LUMBAR SPINE USING POSTERIOR INTERBODY TECHNIQUE N/A 02/06/2023    Procedure: FUSION, SPINE, LUMBAR, PLIF, REVISION, L2-L5 W/ REPAIR NONUNION L3-4;  Surgeon: Hunter Langston MD;  Location: [facility] OR;  Service: Orthopedics;  Laterality: N/A;    LAMINOTOMY Left 08/26/2022    Procedure: LAMINOTOMY, POSTERIOR, CERVICAL, LEFT C6-C7;  Surgeon: Hunter Langston MD;  Location: [facility] OR;  Service: Orthopedics;  Laterality: Left;    LUMBAR LAMINECTOMY  12/14/2012    L2-4, Dr. Stover    PROSTATE SURGERY       RE-EXPLORATION N/A 2024    Procedure: RE-EXPLORATION, INSPECTION FUSION MASS, L2-L5;  Surgeon: Hunter Langston MD;  Location: [facility] OR;  Service: Orthopedics;  Laterality: N/A;    REMOVAL OF HARDWARE FROM SPINE N/A 2024    Procedure: REMOVAL, HARDWARE, SPINE, REMOVAL OF POSTERIOR SPINAL INSTRUMENTATION L2-L5;  Surgeon: Hunter Langston MD;  Location: [facility] OR;  Service: Orthopedics;  Laterality: N/A;  ALPHATEC     Family History   Problem Relation Name Age of Onset    Stroke Mother      Hypertension Mother      Heart disease Mother      Cataracts Father      Heart disease Father      Thyroid disease Sister      Blindness Brother      Heart disease Brother      Stroke Sister       Social History     Tobacco Use    Smoking status: Former     Current packs/day: 0.00     Types: Cigarettes     Quit date: 1980     Years since quittin.2    Smokeless tobacco: Never   Substance Use Topics    Alcohol use: Yes     Comment: SELDOM    Drug use: No     Review of Systems   Constitutional:  Negative for fever.   HENT:  Negative for sore throat.    Respiratory:  Negative for shortness of breath.    Cardiovascular:  Negative for chest pain.   Gastrointestinal:  Negative for nausea.   Genitourinary:  Positive for hematuria. Negative for dysuria.   Musculoskeletal:  Positive for back pain.   Skin:  Negative for rash.   Neurological:  Negative for weakness.   Hematological:  Does not bruise/bleed easily.   All other systems reviewed and are negative.      Physical Exam     Initial Vitals [25 1318]   BP Pulse Resp Temp SpO2   (!) 119/112 86 18 98 °F (36.7 °C) 98 %      MAP       --         Physical Exam    Nursing note and vitals reviewed.  Constitutional: He appears well-developed and well-nourished.   HENT:   Head: Normocephalic and atraumatic.   Eyes: Pupils are equal, round, and reactive to light.   Neck:   Normal range of motion.  Cardiovascular:  Normal rate and regular rhythm.           Pulmonary/Chest:  Breath sounds normal.   Abdominal: Abdomen is soft. Bowel sounds are normal.   Musculoskeletal:         General: Normal range of motion.      Cervical back: Normal range of motion.     Neurological: He is alert and oriented to person, place, and time.   Psychiatric: He has a normal mood and affect.         ED Course   Procedures  Labs Reviewed   URINALYSIS, REFLEX TO URINE CULTURE - Abnormal       Result Value    Specimen UA Urine, Clean Catch      Color, UA Orange (*)     Appearance, UA Clear      pH, UA 6.0      Specific Gravity, UA 1.010      Protein, UA 1+ (*)     Glucose, UA Negative      Ketones, UA Negative      Bilirubin (UA) Negative      Occult Blood UA 3+ (*)     Nitrite, UA Negative      Urobilinogen, UA Negative      Leukocytes, UA Negative      Narrative:     Preferred Collection Type->Urine, Clean Catch  Specimen Source->Urine   CBC W/ AUTO DIFFERENTIAL - Abnormal    WBC 9.28      RBC 4.55 (*)     Hemoglobin 12.8 (*)     Hematocrit 39.1 (*)     MCV 86      MCH 28.1      MCHC 32.7      RDW 14.8 (*)     Platelets 219      MPV 9.5      Immature Granulocytes 0.5      Gran # (ANC) 6.4      Immature Grans (Abs) 0.05 (*)     Lymph # 1.9      Mono # 1.0      Eos # 0.0      Baso # 0.01      nRBC 0      Gran % 68.4      Lymph % 20.2      Mono % 10.8      Eosinophil % 0.0      Basophil % 0.1      Differential Method Automated     BASIC METABOLIC PANEL - Abnormal    Sodium 137      Potassium 4.6      Chloride 106      CO2 22 (*)     Glucose 88      BUN 25 (*)     Creatinine 1.8 (*)     Calcium 9.4      Anion Gap 9      eGFR 38 (*)    URINALYSIS MICROSCOPIC - Abnormal    RBC, UA >100 (*)     WBC, UA 2      Bacteria Negative      Squam Epithel, UA 0      Hyaline Casts, UA 0.3 (*)     Microscopic Comment SEE COMMENT      Narrative:     Preferred Collection Type->Urine, Clean Catch  Specimen Source->Urine          Imaging Results              CT Renal Stone Study ABD Pelvis WO (Final result)  Result time 01/28/25  15:22:19      Final result by Eder Harris MD (01/28/25 15:22:19)                   Impression:      No evidence of an acute intra-abdominal or pelvic process.    No hydronephrosis, nephrolithiasis or ureterolithiasis.    Hepatic and renal cysts.    New predominantly sub 5 mm nodular densities of the visualized lungs.  A follow-up noncontrast chest CT may be obtained in 3-6 months for re-evaluation.    Additional observations as above.      Electronically signed by: Eder Harris MD  Date:    01/28/2025  Time:    15:22               Narrative:    EXAMINATION:  CT RENAL STONE STUDY ABD PELVIS WO    CLINICAL HISTORY:  Hematuria    CT/nuclear cardiac exams in previous 12 months: 1    TECHNIQUE:  Axial CT images were obtained and evaluated with coronal reformatted images.  Iterative reconstruction technique was used.    COMPARISON:  CT abdomen/pelvis 07/02/2023    FINDINGS:  There is mild atelectasis/scarring at the lung bases.  A few predominantly sub 5 mm nodular densities of the bilateral lungs appear new from the previous exam.  A few probable cysts within the liver are unchanged.  No abnormality identified of the pancreas, spleen or adrenal glands.  There are multiple bilateral renal cysts, the largest on the left measuring approximately 5.3 cm.  No hydronephrosis, nephrolithiasis or ureterolithiasis.  Gallbladder is unremarkable.  There is mild colonic diverticulosis.  No evidence of bowel obstruction or appendicitis.  Prostate is enlarged and compresses the urinary bladder floor.  Urinary bladder is unremarkable.  There is diffuse atherosclerotic change of the abdominal aorta.  Infrarenal aorta is mildly dilated with a diameter of 3.1 cm, similar to the previous exam.  There is postoperative change of the lumbar spine with evidence of hardware removal.  Sclerotic appearance of the T12 body is nonspecific and similar to the previous exam.                                       Medications   cloNIDine tablet 0.1 mg  (0.1 mg Oral Given 1/28/25 1338)     Medical Decision Making  Amount and/or Complexity of Data Reviewed  Labs: ordered. Decision-making details documented in ED Course.  Radiology: ordered.    Risk  Prescription drug management.               ED Course as of 01/28/25 1555   Tue Jan 28, 2025   1535 BUN(!): 25 [JT]   1535 Creatinine(!): 1.8  Sees Nephrology.  Discussed labs, CT scan with patient.  Stressed importance of following up with Urology due to hematuria and the enlarged prostate on CT scan.  Patient did not also made aware of pulmonary nodule.  Instructed to follow-up with PCP for referral to pulmonology if needed.  Wife at bedside as well.  Answer questions [JT]      ED Course User Index  [JT] Becky De NP                           Clinical Impression:  Final diagnoses:  [R31.9] Hematuria, unspecified type (Primary)  [R91.1] Pulmonary nodule  [N40.0] Enlarged prostate          ED Disposition Condition    Discharge Stable          ED Prescriptions       Medication Sig Dispense Start Date End Date Auth. Provider    tamsulosin (FLOMAX) 0.4 mg Cap Take 1 capsule (0.4 mg total) by mouth once daily. 10 capsule 1/28/2025 1/28/2026 Becky De NP    sulfamethoxazole-trimethoprim 800-160mg (BACTRIM DS) 800-160 mg Tab Take 1 tablet by mouth 2 (two) times daily. for 7 days 14 tablet 1/28/2025 2/4/2025 Becky De NP          Follow-up Information       Follow up With Specialties Details Why Contact Info    Tho Duran Jr., MD Internal Medicine  As needed [address] [facility] [id number] [phone]               Becky De NP  01/28/25 7880

## 2025-01-29 ENCOUNTER — HOSPITAL ENCOUNTER (EMERGENCY)
Facility: HOSPITAL | Age: 80
Discharge: LEFT BEFORE DISCHARGE | End: 2025-01-29
Attending: EMERGENCY MEDICINE
Payer: MEDICARE

## 2025-01-29 VITALS
WEIGHT: 178 LBS | HEART RATE: 118 BPM | TEMPERATURE: 98 F | OXYGEN SATURATION: 99 % | RESPIRATION RATE: 18 BRPM | BODY MASS INDEX: 23.59 KG/M2 | SYSTOLIC BLOOD PRESSURE: 178 MMHG | DIASTOLIC BLOOD PRESSURE: 113 MMHG | HEIGHT: 73 IN

## 2025-01-29 DIAGNOSIS — R31.9 HEMATURIA, UNSPECIFIED TYPE: Primary | ICD-10-CM

## 2025-01-29 LAB
BACTERIA #/AREA URNS HPF: ABNORMAL /HPF
BASOPHILS # BLD AUTO: 0.01 K/UL (ref 0–0.2)
BASOPHILS NFR BLD: 0.1 % (ref 0–1.9)
BILIRUB UR QL STRIP: ABNORMAL
CLARITY UR: ABNORMAL
COLOR UR: ABNORMAL
DIFFERENTIAL METHOD BLD: ABNORMAL
EOSINOPHIL # BLD AUTO: 0 K/UL (ref 0–0.5)
EOSINOPHIL NFR BLD: 0 % (ref 0–8)
ERYTHROCYTE [DISTWIDTH] IN BLOOD BY AUTOMATED COUNT: 14.8 % (ref 11.5–14.5)
GLUCOSE UR QL STRIP: NEGATIVE
HCT VFR BLD AUTO: 40.2 % (ref 40–54)
HGB BLD-MCNC: 13.3 G/DL (ref 14–18)
HGB UR QL STRIP: ABNORMAL
HYALINE CASTS #/AREA URNS LPF: 0 /LPF
IMM GRANULOCYTES # BLD AUTO: 0.07 K/UL (ref 0–0.04)
IMM GRANULOCYTES NFR BLD AUTO: 0.8 % (ref 0–0.5)
KETONES UR QL STRIP: NEGATIVE
LEUKOCYTE ESTERASE UR QL STRIP: ABNORMAL
LYMPHOCYTES # BLD AUTO: 1.3 K/UL (ref 1–4.8)
LYMPHOCYTES NFR BLD: 16 % (ref 18–48)
MCH RBC QN AUTO: 28.1 PG (ref 27–31)
MCHC RBC AUTO-ENTMCNC: 33.1 G/DL (ref 32–36)
MCV RBC AUTO: 85 FL (ref 82–98)
MICROSCOPIC COMMENT: ABNORMAL
MONOCYTES # BLD AUTO: 0.7 K/UL (ref 0.3–1)
MONOCYTES NFR BLD: 8.7 % (ref 4–15)
NEUTROPHILS # BLD AUTO: 6.2 K/UL (ref 1.8–7.7)
NEUTROPHILS NFR BLD: 74.4 % (ref 38–73)
NITRITE UR QL STRIP: ABNORMAL
NRBC BLD-RTO: 0 /100 WBC
PH UR STRIP: ABNORMAL [PH] (ref 5–8)
PLATELET # BLD AUTO: 234 K/UL (ref 150–450)
PMV BLD AUTO: 10 FL (ref 9.2–12.9)
PROT UR QL STRIP: ABNORMAL
RBC # BLD AUTO: 4.74 M/UL (ref 4.6–6.2)
RBC #/AREA URNS HPF: >100 /HPF (ref 0–4)
SP GR UR STRIP: 1.01 (ref 1–1.03)
SQUAMOUS #/AREA URNS HPF: 1 /HPF
URN SPEC COLLECT METH UR: ABNORMAL
UROBILINOGEN UR STRIP-ACNC: NEGATIVE EU/DL
WBC # BLD AUTO: 8.36 K/UL (ref 3.9–12.7)
WBC #/AREA URNS HPF: 20 /HPF (ref 0–5)

## 2025-01-29 PROCEDURE — 87086 URINE CULTURE/COLONY COUNT: CPT | Performed by: EMERGENCY MEDICINE

## 2025-01-29 PROCEDURE — 36415 COLL VENOUS BLD VENIPUNCTURE: CPT | Performed by: EMERGENCY MEDICINE

## 2025-01-29 PROCEDURE — 81000 URINALYSIS NONAUTO W/SCOPE: CPT | Performed by: EMERGENCY MEDICINE

## 2025-01-29 PROCEDURE — 99283 EMERGENCY DEPT VISIT LOW MDM: CPT

## 2025-01-29 PROCEDURE — 85025 COMPLETE CBC W/AUTO DIFF WBC: CPT | Performed by: EMERGENCY MEDICINE

## 2025-01-29 RX ORDER — METOPROLOL TARTRATE 50 MG/1
50 TABLET ORAL
Status: DISCONTINUED | OUTPATIENT
Start: 2025-01-29 | End: 2025-01-29 | Stop reason: HOSPADM

## 2025-01-29 NOTE — LETTER
Patient: James Baltazar  YOB: 1945  Date: 1/29/2025 Time: 1:06 PM  Location: [facility] [place] [facility]    Leaving the [facility] Against Medical Advice    Chart #:[id number]    This will certify that I, the undersigned,    ______________________________________________________________________    A patient in the above named [facility] center, having requested discharge and removal from the medical [place] against the advice of my attending physician(s), hereby release [facility], its physicians, officers and employees, severally and individually, from any and all liability of any nature whatsoever for any injury or harm or complication of any kind that may result directly or indirectly, by reason of my terminating my stay as a patient at [facility] and my departure from [facility], and hereby waive any and all rights of action I may now have or later acquire as a result of my voluntary departure from [facility] and the termination of my stay as a patient therein.    This release is made with the full knowledge of the danger that may result from the action which I am taking.      Date:_______________________                         ___________________________                                                                                    Patient/Legal Representative    Witness:        ____________________________                          ___________________________  Nurse                                                                        Physician

## 2025-01-29 NOTE — ED NOTES
Pt reports he has blood pressure medicine at home. MD notified. Pt walked out of ED; refused to come back and sign AMA paperwork.

## 2025-01-29 NOTE — ED PROVIDER NOTES
Encounter Date: 1/29/2025       History     Chief Complaint   Patient presents with    Dysuria     Pt reports he was here yesterday and is still having the same problem and has blood in his urine.      79-year-old male on Plavix presents to the ER with hematuria that began yesterday.  Was seen here in the ER, given antibiotics, Flomax states it did clear up, but began bleeding again today.  Had labs drawn yesterday as well.  No other issues.  Afebrile.  Alert and oriented x4, GCS is 15      Review of patient's allergies indicates:   Allergen Reactions    Aspirin Other (See Comments)     GI intolerant     Bidil [isosorbide-hydralazine] Other (See Comments)     Tounge burning       Penicillins Rash    Praluent pen [alirocumab] Rash    Statins-hmg-coa reductase inhibitors     Zetia [ezetimibe] Other (See Comments)     Past Medical History:   Diagnosis Date    Abnormal myocardial perfusion study     Acute blood loss anemia 05/2024    Anticoagulant long-term use     Arthritis     BPH (benign prostatic hyperplasia)     Coronary artery disease     PTCA STENT RCA/CFX 2014 and CFX 2021    Diabetes mellitus     Digestive disorder     Diverticulitis 03/02/2022    Encounter for blood transfusion     GERD (gastroesophageal reflux disease)     Gout     H/O heart artery stent     High cholesterol     Hypercholesteremia     Hypertension     Kidney cysts     Liver disease     UTI (urinary tract infection)     Wears dentures     full top partial bottom     Past Surgical History:   Procedure Laterality Date    ANTERIOR CERVICAL DISCECTOMY W/ FUSION  2007    cardiac stents      CATARACT EXTRACTION, BILATERAL      COLONOSCOPY N/A 05/29/2017    Procedure: COLONOSCOPY;  Surgeon: Luis Bogran-Reyes, MD;  Location: [facility];  Service: Endoscopy;  Laterality: N/A;    COLONOSCOPY, WITH POLYPECTOMY USING HOT BIOPSY FORCEPS N/A 5/14/2024    Procedure: COLONOSCOPY, WITH POLYPECTOMY USING HOT BIOPSY FORCEPS AND SNARE;  Surgeon: Luma Lora  MD KATHERINE;  Location: [facility] ENDO;  Service: Colon and Rectal;  Laterality: N/A;  8th 1030    CORONARY ANGIOGRAPHY N/A 02/02/2021    Procedure: ANGIOGRAM, CORONARY ARTERY;  Surgeon: James Nash MD;  Location: [facility] CATH;  Service: Cardiology;  Laterality: N/A;    DIRECT LATERAL INTERBODY FUSION (DLIF) OF SPINE N/A 02/06/2023    Procedure: FUSION, SPINE, DLIF, PRONE LATERAL INTERBODY FUSION W/ BMP, L3-4;  Surgeon: Hunter Langston MD;  Location: [facility] OR;  Service: Orthopedics;  Laterality: N/A;  Alphatech for Prone, Nuvasive for PSF    EGD, WITH CLOSED BIOPSY N/A 5/14/2024    Procedure: EGD, WITH CLOSED BIOPSY;  Surgeon: Luma Lora MD;  Location: [facility] ENDO;  Service: Colon and Rectal;  Laterality: N/A;    ESOPHAGOGASTRODUODENOSCOPY      FACETECTOMY OF VERTEBRA Left 08/26/2022    Procedure: FACETECTOMY, PARTIAL MEDIAL;  Surgeon: Hunter Langston MD;  Location: [facility] OR;  Service: Orthopedics;  Laterality: Left;    FORAMINOTOMY Left 08/26/2022    Procedure: FORAMINOTOMY, SPINE;  Surgeon: Hunter Langston MD;  Location: [facility] OR;  Service: Orthopedics;  Laterality: Left;    FUSION OF LUMBAR SPINE USING POSTERIOR INTERBODY TECHNIQUE N/A 02/06/2023    Procedure: FUSION, SPINE, LUMBAR, PLIF, REVISION, L2-L5 W/ REPAIR NONUNION L3-4;  Surgeon: Hunter Langston MD;  Location: [facility] OR;  Service: Orthopedics;  Laterality: N/A;    LAMINOTOMY Left 08/26/2022    Procedure: LAMINOTOMY, POSTERIOR, CERVICAL, LEFT C6-C7;  Surgeon: Hunter Langston MD;  Location: [facility] OR;  Service: Orthopedics;  Laterality: Left;    LUMBAR LAMINECTOMY  12/14/2012    L2-4, Dr. Stover    PROSTATE SURGERY      RE-EXPLORATION N/A 6/26/2024    Procedure: RE-EXPLORATION, INSPECTION FUSION MASS, L2-L5;  Surgeon: Hunter Langston MD;  Location: [facility] OR;  Service: Orthopedics;  Laterality: N/A;    REMOVAL OF HARDWARE FROM SPINE N/A 6/26/2024    Procedure: REMOVAL, HARDWARE, SPINE, REMOVAL OF POSTERIOR SPINAL INSTRUMENTATION L2-L5;  Surgeon:  Hunter Langston MD;  Location: [facility] OR;  Service: Orthopedics;  Laterality: N/A;  ALPHATEC     Family History   Problem Relation Name Age of Onset    Stroke Mother      Hypertension Mother      Heart disease Mother      Cataracts Father      Heart disease Father      Thyroid disease Sister      Blindness Brother      Heart disease Brother      Stroke Sister       Social History     Tobacco Use    Smoking status: Former     Current packs/day: 0.00     Types: Cigarettes     Quit date: 1980     Years since quittin.2    Smokeless tobacco: Never   Substance Use Topics    Alcohol use: Yes     Comment: SELDOM    Drug use: No     Review of Systems   Constitutional:  Negative for fever.   HENT:  Negative for sore throat.    Respiratory:  Negative for shortness of breath.    Cardiovascular:  Negative for chest pain.   Gastrointestinal:  Negative for nausea.   Genitourinary:  Positive for hematuria. Negative for dysuria.   Musculoskeletal:  Negative for back pain.   Skin:  Negative for rash.   Neurological:  Negative for weakness.   Hematological:  Does not bruise/bleed easily.   All other systems reviewed and are negative.      Physical Exam     Initial Vitals [25 1209]   BP Pulse Resp Temp SpO2   (!) 178/113 (!) 118 18 97.5 °F (36.4 °C) 99 %      MAP       --         Physical Exam    Nursing note and vitals reviewed.  Constitutional: He appears well-developed and well-nourished. He is not diaphoretic. No distress.   HENT:   Head: Normocephalic and atraumatic.   Eyes: Conjunctivae and EOM are normal. Pupils are equal, round, and reactive to light. Right eye exhibits no discharge. Left eye exhibits no discharge. No scleral icterus.   Neck: Neck supple. No JVD present.   Normal range of motion.  Cardiovascular:  Normal rate, regular rhythm, normal heart sounds and intact distal pulses.           No murmur heard.  Pulmonary/Chest: Breath sounds normal. No stridor. No respiratory distress. He has no wheezes. He  has no rhonchi. He has no rales. He exhibits no tenderness.   Abdominal: Abdomen is soft. Bowel sounds are normal. He exhibits no distension and no mass. There is no abdominal tenderness. There is no rebound and no guarding.   Musculoskeletal:         General: No tenderness or edema. Normal range of motion.      Cervical back: Normal range of motion and neck supple.     Neurological: He is alert and oriented to person, place, and time. He has normal strength. GCS score is 15. GCS eye subscore is 4. GCS verbal subscore is 5. GCS motor subscore is 6.   Skin: Skin is warm and dry. Capillary refill takes less than 2 seconds.         ED Course   Procedures  Labs Reviewed   URINALYSIS, REFLEX TO URINE CULTURE - Abnormal       Result Value    Specimen UA Urine, Clean Catch      Color, UA Red (*)     Appearance, UA Cloudy (*)     pH, UA SEE COMMENT      Specific Gravity, UA 1.015      Protein, UA 1+ (*)     Glucose, UA Negative      Ketones, UA Negative      Bilirubin (UA) 2+ (*)     Occult Blood UA 1+ (*)     Nitrite, UA SEE COMMENT      Urobilinogen, UA Negative      Leukocytes, UA 2+ (*)     Narrative:     Preferred Collection Type->Urine, Clean Catch  Specimen Source->Urine   CBC W/ AUTO DIFFERENTIAL - Abnormal    WBC 8.36      RBC 4.74      Hemoglobin 13.3 (*)     Hematocrit 40.2      MCV 85      MCH 28.1      MCHC 33.1      RDW 14.8 (*)     Platelets 234      MPV 10.0      Immature Granulocytes 0.8 (*)     Gran # (ANC) 6.2      Immature Grans (Abs) 0.07 (*)     Lymph # 1.3      Mono # 0.7      Eos # 0.0      Baso # 0.01      nRBC 0      Gran % 74.4 (*)     Lymph % 16.0 (*)     Mono % 8.7      Eosinophil % 0.0      Basophil % 0.1      Differential Method Automated     URINALYSIS MICROSCOPIC - Abnormal    RBC, UA >100 (*)     WBC, UA 20 (*)     Bacteria None      Squam Epithel, UA 1      Hyaline Casts, UA 0      Microscopic Comment SEE COMMENT      Narrative:     Preferred Collection Type->Urine, Clean Catch  Specimen  Source->Urine   CULTURE, URINE          Imaging Results    None          Medications - No data to display  Medical Decision Making  Amount and/or Complexity of Data Reviewed  Labs: ordered.               ED Course as of 01/30/25 1107   Wed Jan 29, 2025   1253 Labs and CT reviewed from previous visit 2 days ago.  Here in the ER his hemoglobin hematocrit have actually increased from prior.  Stable for discharge and follow up to Urology [SD]      ED Course User Index  [SD] Jim Crabtree MD               Medical Decision Making:   Differential Diagnosis:   Hematuria             Clinical Impression:  Final diagnoses:  [R31.9] Hematuria, unspecified type (Primary)          ED Disposition Condition    AMA Stable                Jim Crabtree MD  01/30/25 1102

## 2025-01-31 LAB — BACTERIA UR CULT: NO GROWTH

## 2025-02-07 NOTE — ED PROVIDER NOTES
EMERGENCY DEPARTMENT HISTORY AND PHYSICAL EXAM          Date: 2/26/2022   Patient Name: James Baltazar       History of Presenting Illness           Chief Complaint   Patient presents with    Abdominal Pain     Pt reports he has been having abd pain for one week now, began with diarrhea then passing gas only, then a semi formed stool this morning.         History Provided By: Patient    0848   James Baltazar is a 76 y.o. male with PMHX of hypertension polycystic kidney disease, CAD who presents to the emergency department C/O abdominal pain.    Patient reports 1 week of abdominal pain that has gotten worse.  States pain is diffuse and crampy.  Patient reports watery diarrhea.  No nausea or vomiting.  No fever.  Says he has had this previously and resolved on its own.      PCP: Tho Duran Jr, MD        No current facility-administered medications for this encounter.     Current Outpatient Medications   Medication Sig Dispense Refill    amlodipine (NORVASC) 10 MG tablet Take 10 mg by mouth once daily.      aspirin (ECOTRIN) 81 MG EC tablet Take 1 tablet (81 mg total) by mouth once. for 1 dose  0    bempedoic acid (NEXLETOL) 180 mg Tab Take 1 tablet (180 mg total) by mouth once daily. 30 tablet 5    cholecalciferol, vitamin D3, (VITAMIN D3) 10 mcg (400 unit) Cap Take 400 Units by mouth once daily.      ciprofloxacin HCl (CIPRO) 500 MG tablet Take 1 tablet (500 mg total) by mouth 2 (two) times daily. for 7 days 14 tablet 0    clopidogreL (PLAVIX) 75 mg tablet Take 1 tablet (75 mg total) by mouth once daily. 30 tablet 11    cyclobenzaprine (FLEXERIL) 10 MG tablet Take 10 mg by mouth 3 (three) times daily as needed for Muscle spasms.      dicyclomine (BENTYL) 20 mg tablet Take 1 tablet (20 mg total) by mouth 2 (two) times daily as needed (Abdominal Cramps/Pain). 20 tablet 0    gabapentin (NEURONTIN) 600 MG tablet Take 0.5 tablets (300 mg total) by mouth 3 (three) times daily as needed. (Patient taking  differently: Take 300 mg by mouth 3 (three) times daily. )      hydrochlorothiazide (HYDRODIURIL) 25 MG tablet Take 25 mg by mouth once daily.      loperamide (IMODIUM) 2 mg capsule Take 1 capsule (2 mg total) by mouth 4 (four) times daily as needed for Diarrhea. 30 capsule 0    metoprolol succinate (TOPROL-XL) 50 MG 24 hr tablet TAKE 2 TABLETS(100 MG) BY MOUTH EVERY  tablet 1    metroNIDAZOLE (FLAGYL) 500 MG tablet Take 1 tablet (500 mg total) by mouth every 8 (eight) hours. for 7 days 21 tablet 0    multivitamin (THERAGRAN) per tablet Take 1 tablet by mouth once daily.      olmesartan (BENICAR) 40 MG tablet Take 40 mg by mouth once daily.      rosuvastatin (CRESTOR) 40 MG Tab TAKE 1 TABLET BY MOUTH DAILY 30 tablet 5    simethicone (MYLICON) 80 MG chewable tablet Take 1 tablet (80 mg total) by mouth every 6 (six) hours as needed for Flatulence. 30 tablet 0           Past History     Past Medical History:   Past Medical History:   Diagnosis Date    BPH (benign prostatic hyperplasia)     Coronary artery disease     PTCA STENT RCA/CFX 2014 and CFX 2021    Diabetes mellitus     NO DM MEDS- PATIENT DENIES    Digestive disorder     Encounter for blood transfusion     GERD (gastroesophageal reflux disease)     Gout     High cholesterol     Hypertension     Kidney cysts     UTI (urinary tract infection)         Past Surgical History:   Past Surgical History:   Procedure Laterality Date    ANTERIOR CERVICAL DISCECTOMY W/ FUSION  2007    cardiac stents      CATARACT EXTRACTION, BILATERAL      COLONOSCOPY      COLONOSCOPY N/A 5/29/2017    Procedure: COLONOSCOPY;  Surgeon: Luis Bogran-Reyes, MD;  Location: [facility];  Service: Endoscopy;  Laterality: N/A;    CORONARY ANGIOGRAPHY N/A 2/2/2021    Procedure: ANGIOGRAM, CORONARY ARTERY;  Surgeon: James Nash MD;  Location: [facility] CATH;  Service: Cardiology;  Laterality: N/A;    ESOPHAGOGASTRODUODENOSCOPY      LUMBAR LAMINECTOMY  12/14/2012  "   L2-4, Dr. Stover    PROSTATE SURGERY          Family History:   Family History   Problem Relation Age of Onset    Stroke Mother     Hypertension Mother     Cataracts Father     Thyroid disease Sister     Blindness Brother     Stroke Sister         Social History:   Social History     Tobacco Use    Smoking status: Former Smoker     Quit date: 1980     Years since quittin.2    Smokeless tobacco: Never Used   Substance Use Topics    Alcohol use: Yes     Comment: SELDOM    Drug use: No        Allergies:   Review of patient's allergies indicates:   Allergen Reactions    Penicillins Rash          Review of Systems   Review of Systems   Constitutional: Negative for chills and fever.   Respiratory: Negative for shortness of breath.    Cardiovascular: Negative for leg swelling.   Gastrointestinal: Positive for abdominal pain and diarrhea. Negative for nausea and vomiting.   All other systems reviewed and are negative.               Physical Exam     Vitals:    22 0837 22 0903   BP: (!) 194/89    BP Location: Right arm    Patient Position: Sitting    Pulse: 68    Resp: 18 18   Temp: 97.8 °F (36.6 °C)    TempSrc: Oral    SpO2: 97%    Weight: 80.7 kg (178 lb)    Height: 6' 1" (1.854 m)       Physical Exam  Vitals and nursing note reviewed.   Constitutional:       General: He is not in acute distress.     Appearance: Normal appearance. He is well-developed. He is not ill-appearing.   HENT:      Head: Normocephalic and atraumatic.      Nose: No congestion or rhinorrhea.   Eyes:      Conjunctiva/sclera: Conjunctivae normal.      Pupils: Pupils are equal, round, and reactive to light.   Cardiovascular:      Rate and Rhythm: Normal rate and regular rhythm.      Heart sounds: Normal heart sounds.   Pulmonary:      Effort: Pulmonary effort is normal. No respiratory distress.   Abdominal:      General: Bowel sounds are increased.      Palpations: Abdomen is soft.      Tenderness: There is " abdominal tenderness in the right lower quadrant, periumbilical area and left lower quadrant. There is no guarding or rebound.   Musculoskeletal:         General: No deformity or signs of injury. Normal range of motion.      Cervical back: Normal range of motion and neck supple.   Skin:     General: Skin is warm and dry.      Coloration: Skin is not pale.      Findings: No lesion.   Neurological:      General: No focal deficit present.      Mental Status: He is alert and oriented to person, place, and time.      Cranial Nerves: No cranial nerve deficit.      Sensory: No sensory deficit.      Motor: No weakness.   Psychiatric:         Mood and Affect: Mood normal.         Behavior: Behavior normal.              Diagnostic Study Results      Labs -   Recent Results (from the past 12 hour(s))   CBC W/ AUTO DIFFERENTIAL    Collection Time: 02/26/22  8:55 AM   Result Value Ref Range    WBC 7.35 3.90 - 12.70 K/uL    RBC 4.69 4.60 - 6.20 M/uL    Hemoglobin 14.3 14.0 - 18.0 g/dL    Hematocrit 40.5 40.0 - 54.0 %    MCV 86 82 - 98 fL    MCH 30.5 27.0 - 31.0 pg    MCHC 35.3 32.0 - 36.0 g/dL    RDW 14.3 11.5 - 14.5 %    Platelets 150 150 - 450 K/uL    MPV 10.2 9.2 - 12.9 fL    Immature Granulocytes 0.7 (H) 0.0 - 0.5 %    Gran # (ANC) 4.9 1.8 - 7.7 K/uL    Immature Grans (Abs) 0.05 (H) 0.00 - 0.04 K/uL    Lymph # 1.9 1.0 - 4.8 K/uL    Mono # 0.5 0.3 - 1.0 K/uL    Eos # 0.0 0.0 - 0.5 K/uL    Baso # 0.01 0.00 - 0.20 K/uL    nRBC 0 0 /100 WBC    Gran % 66.7 38.0 - 73.0 %    Lymph % 25.7 18.0 - 48.0 %    Mono % 6.8 4.0 - 15.0 %    Eosinophil % 0.0 0.0 - 8.0 %    Basophil % 0.1 0.0 - 1.9 %    Differential Method Automated    Comp. Metabolic Panel    Collection Time: 02/26/22  8:55 AM   Result Value Ref Range    Sodium 140 136 - 145 mmol/L    Potassium 3.6 3.5 - 5.1 mmol/L    Chloride 107 95 - 110 mmol/L    CO2 31 (H) 23 - 29 mmol/L    Glucose 111 (H) 70 - 110 mg/dL    BUN 20 8 - 23 mg/dL    Creatinine 1.5 (H) 0.5 - 1.4 mg/dL     Calcium 8.9 8.7 - 10.5 mg/dL    Total Protein 7.5 6.0 - 8.4 g/dL    Albumin 3.4 (L) 3.5 - 5.2 g/dL    Total Bilirubin 0.4 0.1 - 1.0 mg/dL    Alkaline Phosphatase 98 55 - 135 U/L    AST 27 10 - 40 U/L    ALT 53 (H) 10 - 44 U/L    Anion Gap 2 (L) 8 - 16 mmol/L    eGFR if African American 51.9 (A) >60 mL/min/1.73 m^2    eGFR if non African American 44.9 (A) >60 mL/min/1.73 m^2   Lipase    Collection Time: 02/26/22  8:55 AM   Result Value Ref Range    Lipase Result 317 (H) 23 - 300 U/L   Urinalysis, Reflex to Urine Culture Urine, Clean Catch    Collection Time: 02/26/22  8:59 AM    Specimen: Urine, Clean Catch   Result Value Ref Range    Specimen UA Urine, Clean Catch     Color, UA Yellow Yellow, Straw, Maria Dolores    Appearance, UA Clear Clear    pH, UA 6.0 5.0 - 8.0    Specific Gravity, UA 1.010 1.005 - 1.030    Protein, UA Negative Negative    Glucose, UA Negative Negative    Ketones, UA Negative Negative    Bilirubin (UA) Negative Negative    Occult Blood UA Negative Negative    Nitrite, UA Negative Negative    Urobilinogen, UA Negative <2.0 EU/dL    Leukocytes, UA Negative Negative        Radiologic Studies -    CT Abdomen Pelvis With Contrast   Final Result      1. Diverticulosis throughout the course of the colon with question of very subtle stranding at the junction of the descending and sigmoid colon for which very mild diverticulitis is considered in the differential.   2. Nonspecific enlargement of the prostate   3. Numerous bilateral renal cysts and probable small scattered hepatic cysts   4. Stable aneurysmal dilatation of the iliac vasculature and infrarenal aorta         Electronically signed by: Tomasz Harris MD   Date:    02/26/2022   Time:    10:52           Medications given in the ED-   Medications   dicyclomine injection 20 mg (20 mg Intramuscular Given 2/26/22 0902)   morphine injection 4 mg (4 mg Intravenous Given 2/26/22 0903)   iohexoL (OMNIPAQUE 350) injection 100 mL (100 mLs Intravenous Given 2/26/22  5877)           Medical Decision Making    I am the first provider for this patient.     I reviewed the vital signs, available nursing notes, past medical history, past surgical history, family history and social history.     Vital Signs:  Reviewed the patient's vital signs.     Pulse Oximetry Analysis and Interpretation:    97% on Room Air, normal        Records Reviewed: Old medical records.  Nursing notes.        Provider Notes (Medical Decision Making): James Baltazar is a 76 y.o. male with diffuse abdominal pain x1 wound.  Will treat symptomatically, check labs evaluate for obstructive etilogy, diverticulitis,     Procedures:   Procedures      ED Course:    11:07 AM  Labs unremarkable noting elevated creatinine at baseline.  CT abdomen pelvis demonstrates diverticulosis with questionable with findings of mild diverticulitis in the descending and sigmoid colon  Patient also has known bilateral renal cyst and stable aortic aneurysm  Overall clinically patient is presenting as a diverticulitis complaining of gas and diffuse crampy abdominal pain as well as diarrhea.  Will treat for diverticulitis.  Instructed patient on typical course and treatment strategies and encouraged she follow up with his primary care provider.  Return precautions given.           Diagnosis and Disposition     Critical Care:      DISCHARGE NOTE:       James Baltazar's  results have been reviewed with him.  He has been counseled regarding his diagnosis, treatment, and plan.  He verbally conveys understanding and agreement of the signs, symptoms, diagnosis, treatment and prognosis and additionally agrees to follow up as discussed.  He also agrees with the care-plan and conveys that all of his questions have been answered.  I have also provided discharge instructions for him that include: educational information regarding their diagnosis and treatment, and list of reasons why they would want to return to the ED prior to their follow-up  appointment, should his condition change. He has been provided with education for proper emergency department utilization.         CLINICAL IMPRESSION:        1. Diverticulitis              PLAN:   1. Discharge Home  2.      Medication List      START taking these medications    ciprofloxacin HCl 500 MG tablet  Commonly known as: CIPRO  Take 1 tablet (500 mg total) by mouth 2 (two) times daily. for 7 days     dicyclomine 20 mg tablet  Commonly known as: BENTYL  Take 1 tablet (20 mg total) by mouth 2 (two) times daily as needed (Abdominal Cramps/Pain).     loperamide 2 mg capsule  Commonly known as: IMODIUM  Take 1 capsule (2 mg total) by mouth 4 (four) times daily as needed for Diarrhea.     metroNIDAZOLE 500 MG tablet  Commonly known as: FLAGYL  Take 1 tablet (500 mg total) by mouth every 8 (eight) hours. for 7 days     simethicone 80 MG chewable tablet  Commonly known as: MYLICON  Take 1 tablet (80 mg total) by mouth every 6 (six) hours as needed for Flatulence.        ASK your doctor about these medications    amLODIPine 10 MG tablet  Commonly known as: NORVASC     aspirin 81 MG EC tablet  Commonly known as: ECOTRIN  Take 1 tablet (81 mg total) by mouth once. for 1 dose     clopidogreL 75 mg tablet  Commonly known as: PLAVIX  Take 1 tablet (75 mg total) by mouth once daily.     cyclobenzaprine 10 MG tablet  Commonly known as: FLEXERIL     gabapentin 600 MG tablet  Commonly known as: NEURONTIN  Take 0.5 tablets (300 mg total) by mouth 3 (three) times daily as needed.     hydroCHLOROthiazide 25 MG tablet  Commonly known as: HYDRODIURIL     metoprolol succinate 50 MG 24 hr tablet  Commonly known as: TOPROL-XL  TAKE 2 TABLETS(100 MG) BY MOUTH EVERY DAY     multivitamin per tablet  Commonly known as: THERAGRAN     NEXLETOL 180 mg Tab  Generic drug: bempedoic acid  Take 1 tablet (180 mg total) by mouth once daily.     olmesartan 40 MG tablet  Commonly known as: BENICAR     rosuvastatin 40 MG Tab  Commonly known as:  CRESTOR  TAKE 1 TABLET BY MOUTH DAILY     VITAMIN D3 10 mcg (400 unit) Cap  Generic drug: cholecalciferol (vitamin D3)           Where to Get Your Medications      These medications were sent to [facility]/pharmacy #[id number] - [facility] [address]  [address] [address]    Phone: [phone]   · ciprofloxacin HCl 500 MG tablet  · dicyclomine 20 mg tablet  · loperamide 2 mg capsule  · metroNIDAZOLE 500 MG tablet  · simethicone 80 MG chewable tablet        3. Tho Duran Jr., MD  [address] [facility] [address]  [phone]    Schedule an appointment as soon as possible for a visit   Primary care follow up    [facility] Emergency Department  [address] [place] [id number] [phone]  Go to   If symptoms worsen       _______________________________     Please note that this dictation was completed with [employer], the computer voice recognition software.  Quite often unanticipated grammatical, syntax, homophones, and other interpretive errors are inadvertently transcribed by the computer software.  Please disregard these errors.  Please excuse any errors that have escaped final proofreading.             Tera Whittington MD  02/26/22 7337     104

## 2025-03-21 PROBLEM — R80.9 MICROALBUMINURIA: Status: ACTIVE | Noted: 2025-03-21

## 2025-03-21 PROBLEM — H70.91 MASTOIDITIS OF RIGHT SIDE: Status: RESOLVED | Noted: 2020-08-11 | Resolved: 2025-03-21

## 2025-03-21 PROBLEM — R05.1 ACUTE COUGH: Status: RESOLVED | Noted: 2024-04-08 | Resolved: 2025-03-21

## 2025-03-21 PROBLEM — R11.2 NAUSEA AND VOMITING: Status: RESOLVED | Noted: 2024-06-26 | Resolved: 2025-03-21

## 2025-04-02 ENCOUNTER — LAB VISIT (OUTPATIENT)
Dept: LAB | Facility: HOSPITAL | Age: 80
End: 2025-04-02
Attending: INTERNAL MEDICINE
Payer: MEDICARE

## 2025-04-02 DIAGNOSIS — Q61.3 CONGENITAL POLYCYSTIC KIDNEY DISEASE: ICD-10-CM

## 2025-04-02 DIAGNOSIS — N18.31 CHRONIC KIDNEY DISEASE (CKD) STAGE G3A/A1, MODERATELY DECREASED GLOMERULAR FILTRATION RATE (GFR) BETWEEN 45-59 ML/MIN/1.73 SQUARE METER AND ALBUMINURIA CREATININE RATIO LESS THAN 30 MG/G: ICD-10-CM

## 2025-04-02 DIAGNOSIS — I12.9 PARENCHYMAL RENAL HYPERTENSION: Primary | ICD-10-CM

## 2025-04-02 LAB
ABSOLUTE EOSINOPHIL (OHS): 0 K/UL
ABSOLUTE MONOCYTE (OHS): 0.63 K/UL (ref 0.3–1)
ABSOLUTE NEUTROPHIL COUNT (OHS): 5.13 K/UL (ref 1.8–7.7)
ALBUMIN SERPL BCP-MCNC: 3.7 G/DL (ref 3.5–5.2)
ALP SERPL-CCNC: 122 UNIT/L (ref 40–150)
ALT SERPL W/O P-5'-P-CCNC: 20 UNIT/L (ref 10–44)
ANION GAP (OHS): 9 MMOL/L (ref 8–16)
AST SERPL-CCNC: 24 UNIT/L (ref 11–45)
BASOPHILS # BLD AUTO: 0.01 K/UL
BASOPHILS NFR BLD AUTO: 0.1 %
BILIRUB SERPL-MCNC: 0.5 MG/DL (ref 0.1–1)
BUN SERPL-MCNC: 25 MG/DL (ref 8–23)
CALCIUM SERPL-MCNC: 9.2 MG/DL (ref 8.7–10.5)
CHLORIDE SERPL-SCNC: 106 MMOL/L (ref 95–110)
CO2 SERPL-SCNC: 26 MMOL/L (ref 23–29)
CREAT SERPL-MCNC: 1.9 MG/DL (ref 0.5–1.4)
CREAT UR-MCNC: 100.9 MG/DL (ref 23–375)
ERYTHROCYTE [DISTWIDTH] IN BLOOD BY AUTOMATED COUNT: 14.7 % (ref 11.5–14.5)
GFR SERPLBLD CREATININE-BSD FMLA CKD-EPI: 36 ML/MIN/1.73/M2
GLUCOSE SERPL-MCNC: 119 MG/DL (ref 70–110)
HCT VFR BLD AUTO: 38.8 % (ref 40–54)
HGB BLD-MCNC: 12.7 GM/DL (ref 14–18)
IMM GRANULOCYTES # BLD AUTO: 0.04 K/UL (ref 0–0.04)
IMM GRANULOCYTES NFR BLD AUTO: 0.5 % (ref 0–0.5)
LYMPHOCYTES # BLD AUTO: 1.62 K/UL (ref 1–4.8)
MCH RBC QN AUTO: 28.6 PG (ref 27–31)
MCHC RBC AUTO-ENTMCNC: 32.7 G/DL (ref 32–36)
MCV RBC AUTO: 87 FL (ref 82–98)
NUCLEATED RBC (/100WBC) (OHS): 0 /100 WBC
PLATELET # BLD AUTO: 204 K/UL (ref 150–450)
PMV BLD AUTO: 10.1 FL (ref 9.2–12.9)
POTASSIUM SERPL-SCNC: 4.1 MMOL/L (ref 3.5–5.1)
PROT SERPL-MCNC: 7.1 GM/DL (ref 6–8.4)
PROT UR-MCNC: <7 MG/DL
PROT/CREAT UR: NORMAL MG/G{CREAT}
RBC # BLD AUTO: 4.44 M/UL (ref 4.6–6.2)
RELATIVE EOSINOPHIL (OHS): 0 %
RELATIVE LYMPHOCYTE (OHS): 21.8 % (ref 18–48)
RELATIVE MONOCYTE (OHS): 8.5 % (ref 4–15)
RELATIVE NEUTROPHIL (OHS): 69.1 % (ref 38–73)
SODIUM SERPL-SCNC: 141 MMOL/L (ref 136–145)
WBC # BLD AUTO: 7.43 K/UL (ref 3.9–12.7)

## 2025-04-02 PROCEDURE — 36415 COLL VENOUS BLD VENIPUNCTURE: CPT

## 2025-04-02 PROCEDURE — 84075 ASSAY ALKALINE PHOSPHATASE: CPT

## 2025-04-02 PROCEDURE — 82570 ASSAY OF URINE CREATININE: CPT

## 2025-04-02 PROCEDURE — 85025 COMPLETE CBC W/AUTO DIFF WBC: CPT

## 2025-08-11 ENCOUNTER — LAB VISIT (OUTPATIENT)
Dept: LAB | Facility: HOSPITAL | Age: 80
End: 2025-08-11
Attending: INTERNAL MEDICINE
Payer: MEDICARE

## 2025-08-11 ENCOUNTER — HOSPITAL ENCOUNTER (OUTPATIENT)
Dept: RADIOLOGY | Facility: HOSPITAL | Age: 80
Discharge: HOME | End: 2025-08-11
Attending: INTERNAL MEDICINE
Payer: MEDICARE

## 2025-08-11 DIAGNOSIS — Q61.3 CONGENITAL POLYCYSTIC KIDNEY DISEASE: Primary | ICD-10-CM

## 2025-08-11 DIAGNOSIS — N18.31 CHRONIC KIDNEY DISEASE (CKD) STAGE G3A/A1, MODERATELY DECREASED GLOMERULAR FILTRATION RATE (GFR) BETWEEN 45-59 ML/MIN/1.73 SQUARE METER AND ALBUMINURIA CREATININE RATIO LESS THAN 30 MG/G: ICD-10-CM

## 2025-08-11 DIAGNOSIS — N18.31 CHRONIC KIDNEY DISEASE, STAGE 3A: ICD-10-CM

## 2025-08-11 DIAGNOSIS — Q61.3 POLYCYSTIC KIDNEY, UNSPECIFIED: ICD-10-CM

## 2025-08-11 DIAGNOSIS — I12.9 PARENCHYMAL RENAL HYPERTENSION: ICD-10-CM

## 2025-08-11 LAB
ABSOLUTE EOSINOPHIL (OHS): 0 K/UL
ABSOLUTE MONOCYTE (OHS): 0.48 K/UL (ref 0.3–1)
ABSOLUTE NEUTROPHIL COUNT (OHS): 4.45 K/UL (ref 1.8–7.7)
ALBUMIN SERPL BCP-MCNC: 3.8 G/DL (ref 3.5–5.2)
ALP SERPL-CCNC: 145 UNIT/L (ref 40–150)
ALT SERPL W/O P-5'-P-CCNC: 35 UNIT/L (ref 10–44)
ANION GAP (OHS): 8 MMOL/L (ref 8–16)
AST SERPL-CCNC: 29 UNIT/L (ref 11–45)
BASOPHILS # BLD AUTO: 0.01 K/UL
BASOPHILS NFR BLD AUTO: 0.2 %
BILIRUB SERPL-MCNC: 0.4 MG/DL (ref 0.1–1)
BUN SERPL-MCNC: 26 MG/DL (ref 8–23)
CALCIUM SERPL-MCNC: 9.4 MG/DL (ref 8.7–10.5)
CHLORIDE SERPL-SCNC: 106 MMOL/L (ref 95–110)
CO2 SERPL-SCNC: 26 MMOL/L (ref 23–29)
CREAT SERPL-MCNC: 1.7 MG/DL (ref 0.5–1.4)
CREAT UR-MCNC: 78.3 MG/DL (ref 23–375)
ERYTHROCYTE [DISTWIDTH] IN BLOOD BY AUTOMATED COUNT: 14.5 % (ref 11.5–14.5)
GFR SERPLBLD CREATININE-BSD FMLA CKD-EPI: 41 ML/MIN/1.73/M2
GLUCOSE SERPL-MCNC: 115 MG/DL (ref 70–110)
HCT VFR BLD AUTO: 39 % (ref 40–54)
HGB BLD-MCNC: 13.2 GM/DL (ref 14–18)
IMM GRANULOCYTES # BLD AUTO: 0.04 K/UL (ref 0–0.04)
IMM GRANULOCYTES NFR BLD AUTO: 0.6 % (ref 0–0.5)
LYMPHOCYTES # BLD AUTO: 1.6 K/UL (ref 1–4.8)
MCH RBC QN AUTO: 28.6 PG (ref 27–31)
MCHC RBC AUTO-ENTMCNC: 33.8 G/DL (ref 32–36)
MCV RBC AUTO: 84 FL (ref 82–98)
NUCLEATED RBC (/100WBC) (OHS): 0 /100 WBC
PLATELET # BLD AUTO: 177 K/UL (ref 150–450)
PMV BLD AUTO: 9.9 FL (ref 9.2–12.9)
POTASSIUM SERPL-SCNC: 3.9 MMOL/L (ref 3.5–5.1)
PROT SERPL-MCNC: 7.5 GM/DL (ref 6–8.4)
PROT UR-MCNC: 7 MG/DL
PROT/CREAT UR: 0.09 MG/G{CREAT}
RBC # BLD AUTO: 4.62 M/UL (ref 4.6–6.2)
RELATIVE EOSINOPHIL (OHS): 0 %
RELATIVE LYMPHOCYTE (OHS): 24.3 % (ref 18–48)
RELATIVE MONOCYTE (OHS): 7.3 % (ref 4–15)
RELATIVE NEUTROPHIL (OHS): 67.6 % (ref 38–73)
SODIUM SERPL-SCNC: 140 MMOL/L (ref 136–145)
WBC # BLD AUTO: 6.58 K/UL (ref 3.9–12.7)

## 2025-08-11 PROCEDURE — 76770 US EXAM ABDO BACK WALL COMP: CPT | Mod: TC

## 2025-08-11 PROCEDURE — 85025 COMPLETE CBC W/AUTO DIFF WBC: CPT

## 2025-08-11 PROCEDURE — 36415 COLL VENOUS BLD VENIPUNCTURE: CPT

## 2025-08-11 PROCEDURE — 84520 ASSAY OF UREA NITROGEN: CPT

## 2025-08-11 PROCEDURE — 84156 ASSAY OF PROTEIN URINE: CPT

## (undated) DEVICE — ELECTRODE MEDI-TRACE 855 FOAM

## (undated) DEVICE — ELECTRODE REM PLYHSV RETURN 9

## (undated) DEVICE — KIT BIOGUARD AIR WTR SUC VALVE

## (undated) DEVICE — KIT VIA CUSTOM PROCEDURE

## (undated) DEVICE — SNARE SNAREMASTER OVAL 25MM

## (undated) DEVICE — TRAP ETRAP POLYP 50 TRAY

## (undated) DEVICE — LINER SUCTION CANNISTER REGUGA

## (undated) DEVICE — FORCEP ENDOJAW HOT OVAL 2.8MM

## (undated) DEVICE — CANNULA SSOFT C02 MALE 14FT

## (undated) DEVICE — CONNECTOR WATERJET ENDO

## (undated) DEVICE — UNDERPAD DELUXE FLUFF 30X30IN

## (undated) DEVICE — GOWN NONREINF SET-IN SLV XL

## (undated) DEVICE — FORCEP ENDOJAW OVL NDL 2X1550

## (undated) DEVICE — TUBING IRRIGATION W/ AIR

## (undated) DEVICE — SOL IRRI STRL WATER 1000ML

## (undated) DEVICE — BLOCK BITE ADULT 60FR

## (undated) DEVICE — SPONGE DRY VIA GREEN